# Patient Record
Sex: MALE | URBAN - METROPOLITAN AREA
[De-identification: names, ages, dates, MRNs, and addresses within clinical notes are randomized per-mention and may not be internally consistent; named-entity substitution may affect disease eponyms.]

---

## 2018-09-20 ENCOUNTER — APPOINTMENT (RX ONLY)
Dept: URBAN - METROPOLITAN AREA CLINIC 53 | Facility: CLINIC | Age: 68
Setting detail: DERMATOLOGY
End: 2018-09-20

## 2018-09-20 DIAGNOSIS — L81.4 OTHER MELANIN HYPERPIGMENTATION: ICD-10-CM

## 2018-09-20 DIAGNOSIS — B07.8 OTHER VIRAL WARTS: ICD-10-CM

## 2018-09-20 DIAGNOSIS — Z85.828 PERSONAL HISTORY OF OTHER MALIGNANT NEOPLASM OF SKIN: ICD-10-CM

## 2018-09-20 DIAGNOSIS — S31000A OPEN WOUND(S) (MULTIPLE) OF UNSPECIFIED SITE(S), WITHOUT MENTION OF COMPLICATION: ICD-10-CM

## 2018-09-20 DIAGNOSIS — L40.0 PSORIASIS VULGARIS: ICD-10-CM | Status: WELL CONTROLLED

## 2018-09-20 DIAGNOSIS — D22 MELANOCYTIC NEVI: ICD-10-CM

## 2018-09-20 DIAGNOSIS — L57.0 ACTINIC KERATOSIS: ICD-10-CM

## 2018-09-20 DIAGNOSIS — D69.2 OTHER NONTHROMBOCYTOPENIC PURPURA: ICD-10-CM

## 2018-09-20 DIAGNOSIS — L82.1 OTHER SEBORRHEIC KERATOSIS: ICD-10-CM

## 2018-09-20 PROBLEM — D89.9 DISORDER INVOLVING THE IMMUNE MECHANISM, UNSPECIFIED: Status: ACTIVE | Noted: 2018-09-20

## 2018-09-20 PROBLEM — I10 ESSENTIAL (PRIMARY) HYPERTENSION: Status: ACTIVE | Noted: 2018-09-20

## 2018-09-20 PROBLEM — F32.9 MAJOR DEPRESSIVE DISORDER, SINGLE EPISODE, UNSPECIFIED: Status: ACTIVE | Noted: 2018-09-20

## 2018-09-20 PROBLEM — S51.812A LACERATION WITHOUT FOREIGN BODY OF LEFT FOREARM, INITIAL ENCOUNTER: Status: ACTIVE | Noted: 2018-09-20

## 2018-09-20 PROBLEM — S51.811A LACERATION WITHOUT FOREIGN BODY OF RIGHT FOREARM, INITIAL ENCOUNTER: Status: ACTIVE | Noted: 2018-09-20

## 2018-09-20 PROBLEM — D22.5 MELANOCYTIC NEVI OF TRUNK: Status: ACTIVE | Noted: 2018-09-20

## 2018-09-20 PROBLEM — L29.8 OTHER PRURITUS: Status: ACTIVE | Noted: 2018-09-20

## 2018-09-20 PROCEDURE — 17003 DESTRUCT PREMALG LES 2-14: CPT

## 2018-09-20 PROCEDURE — ? COUNSELING

## 2018-09-20 PROCEDURE — ? LIQUID NITROGEN

## 2018-09-20 PROCEDURE — 17110 DESTRUCTION B9 LES UP TO 14: CPT

## 2018-09-20 PROCEDURE — 17000 DESTRUCT PREMALG LESION: CPT | Mod: 59

## 2018-09-20 PROCEDURE — 99214 OFFICE O/P EST MOD 30 MIN: CPT | Mod: 25

## 2018-09-20 ASSESSMENT — LOCATION DETAILED DESCRIPTION DERM
LOCATION DETAILED: LEFT ULNAR DORSAL HAND
LOCATION DETAILED: LEFT MID-UPPER BACK
LOCATION DETAILED: RIGHT DISTAL DORSAL FOREARM
LOCATION DETAILED: LEFT SUPERIOR FOREHEAD
LOCATION DETAILED: LEFT FOREHEAD
LOCATION DETAILED: STERNUM
LOCATION DETAILED: RIGHT SUPERIOR FOREHEAD
LOCATION DETAILED: RIGHT RADIAL DORSAL HAND
LOCATION DETAILED: LEFT DISTAL DORSAL FOREARM
LOCATION DETAILED: SUPERIOR THORACIC SPINE

## 2018-09-20 ASSESSMENT — LOCATION SIMPLE DESCRIPTION DERM
LOCATION SIMPLE: LEFT FOREHEAD
LOCATION SIMPLE: CHEST
LOCATION SIMPLE: LEFT HAND
LOCATION SIMPLE: UPPER BACK
LOCATION SIMPLE: RIGHT FOREARM
LOCATION SIMPLE: RIGHT FOREHEAD
LOCATION SIMPLE: LEFT UPPER BACK
LOCATION SIMPLE: RIGHT HAND
LOCATION SIMPLE: LEFT FOREARM

## 2018-09-20 ASSESSMENT — LOCATION ZONE DERM
LOCATION ZONE: TRUNK
LOCATION ZONE: FACE
LOCATION ZONE: HAND
LOCATION ZONE: ARM

## 2018-09-20 NOTE — PROCEDURE: LIQUID NITROGEN
Medical Necessity Information: It is in your best interest to select a reason for this procedure from the list below. All of these items fulfill various CMS LCD requirements except the new and changing color options.
Number Of Freeze-Thaw Cycles: 2 freeze-thaw cycles
Medical Necessity Clause: This procedure was medically necessary because the lesions that were treated were:
Render Post-Care Instructions In Note?: no
Detail Level: Detailed
Consent: The patient's consent was obtained including but not limited to risks of crusting, scabbing, blistering, scarring, darker or lighter pigmentary change, recurrence, incomplete removal and infection.
Post-Care Instructions: I reviewed with the patient in detail post-care instructions. Patient is to wear sunprotection, and avoid picking at any of the treated lesions. Pt may apply Vaseline to crusted or scabbing areas.
Duration Of Freeze Thaw-Cycle (Seconds): 0

## 2019-03-28 ENCOUNTER — APPOINTMENT (RX ONLY)
Dept: URBAN - METROPOLITAN AREA CLINIC 53 | Facility: CLINIC | Age: 69
Setting detail: DERMATOLOGY
End: 2019-03-28

## 2019-03-28 DIAGNOSIS — Z85.828 PERSONAL HISTORY OF OTHER MALIGNANT NEOPLASM OF SKIN: ICD-10-CM | Status: RESOLVED

## 2019-03-28 DIAGNOSIS — L81.4 OTHER MELANIN HYPERPIGMENTATION: ICD-10-CM

## 2019-03-28 DIAGNOSIS — L40.0 PSORIASIS VULGARIS: ICD-10-CM | Status: WELL CONTROLLED

## 2019-03-28 DIAGNOSIS — L82.1 OTHER SEBORRHEIC KERATOSIS: ICD-10-CM

## 2019-03-28 DIAGNOSIS — D22 MELANOCYTIC NEVI: ICD-10-CM

## 2019-03-28 PROBLEM — D22.5 MELANOCYTIC NEVI OF TRUNK: Status: ACTIVE | Noted: 2019-03-28

## 2019-03-28 PROCEDURE — 99214 OFFICE O/P EST MOD 30 MIN: CPT

## 2019-03-28 PROCEDURE — ? PRESCRIPTION

## 2019-03-28 PROCEDURE — ? COUNSELING

## 2019-03-28 RX ORDER — CLOBETASOL PROPIONATE 0.5 MG/G
OINTMENT TOPICAL
Qty: 1 | Refills: 6 | Status: ERX | COMMUNITY
Start: 2019-03-28

## 2019-03-28 RX ADMIN — CLOBETASOL PROPIONATE: 0.5 OINTMENT TOPICAL at 00:00

## 2019-03-28 ASSESSMENT — LOCATION SIMPLE DESCRIPTION DERM
LOCATION SIMPLE: RIGHT HAND
LOCATION SIMPLE: UPPER BACK
LOCATION SIMPLE: RIGHT UPPER BACK

## 2019-03-28 ASSESSMENT — LOCATION DETAILED DESCRIPTION DERM
LOCATION DETAILED: RIGHT RADIAL DORSAL HAND
LOCATION DETAILED: SUPERIOR THORACIC SPINE
LOCATION DETAILED: INFERIOR THORACIC SPINE
LOCATION DETAILED: RIGHT SUPERIOR LATERAL UPPER BACK

## 2019-03-28 ASSESSMENT — LOCATION ZONE DERM
LOCATION ZONE: HAND
LOCATION ZONE: TRUNK

## 2019-08-24 ENCOUNTER — APPOINTMENT (OUTPATIENT)
Dept: GENERAL RADIOLOGY | Age: 69
DRG: 543 | End: 2019-08-24
Attending: EMERGENCY MEDICINE
Payer: MEDICARE

## 2019-08-24 ENCOUNTER — APPOINTMENT (OUTPATIENT)
Dept: CT IMAGING | Age: 69
DRG: 543 | End: 2019-08-24
Attending: EMERGENCY MEDICINE
Payer: MEDICARE

## 2019-08-24 ENCOUNTER — HOSPITAL ENCOUNTER (INPATIENT)
Age: 69
LOS: 2 days | Discharge: HOME | DRG: 543 | End: 2019-08-26
Attending: EMERGENCY MEDICINE | Admitting: INTERNAL MEDICINE
Payer: MEDICARE

## 2019-08-24 DIAGNOSIS — R52 INTRACTABLE PAIN: ICD-10-CM

## 2019-08-24 DIAGNOSIS — S22.000A CLOSED COMPRESSION FRACTURE OF THORACIC VERTEBRA, INITIAL ENCOUNTER (HCC): Primary | ICD-10-CM

## 2019-08-24 PROBLEM — G89.29 CHRONIC PAIN: Status: ACTIVE | Noted: 2019-08-24

## 2019-08-24 PROBLEM — J44.9 COPD (CHRONIC OBSTRUCTIVE PULMONARY DISEASE) (HCC): Status: ACTIVE | Noted: 2019-08-24

## 2019-08-24 LAB
ALBUMIN SERPL-MCNC: 4.6 G/DL (ref 3.5–5)
ALBUMIN/GLOB SERPL: 1.3 {RATIO} (ref 1.1–2.2)
ALP SERPL-CCNC: 84 U/L (ref 45–117)
ALT SERPL-CCNC: 20 U/L (ref 12–78)
ANION GAP SERPL CALC-SCNC: 7 MMOL/L (ref 5–15)
APPEARANCE UR: ABNORMAL
APTT PPP: 28.7 SEC (ref 22.1–32)
AST SERPL-CCNC: 13 U/L (ref 15–37)
BACTERIA URNS QL MICRO: NEGATIVE /HPF
BASOPHILS # BLD: 0.1 K/UL (ref 0–0.1)
BASOPHILS NFR BLD: 1 % (ref 0–1)
BILIRUB SERPL-MCNC: 1.1 MG/DL (ref 0.2–1)
BILIRUB UR QL: NEGATIVE
BUN SERPL-MCNC: 22 MG/DL (ref 6–20)
BUN/CREAT SERPL: 19 (ref 12–20)
CALCIUM SERPL-MCNC: 9.9 MG/DL (ref 8.5–10.1)
CHLORIDE SERPL-SCNC: 93 MMOL/L (ref 97–108)
CK MB CFR SERPL CALC: 2.1 % (ref 0–2.5)
CK MB SERPL-MCNC: 1.9 NG/ML (ref 5–25)
CK SERPL-CCNC: 90 U/L (ref 39–308)
CO2 SERPL-SCNC: 30 MMOL/L (ref 21–32)
COLOR UR: ABNORMAL
COMMENT, HOLDF: NORMAL
CREAT SERPL-MCNC: 1.14 MG/DL (ref 0.7–1.3)
CRP SERPL-MCNC: 0.5 MG/DL (ref 0–0.6)
DIFFERENTIAL METHOD BLD: ABNORMAL
EOSINOPHIL # BLD: 0.8 K/UL (ref 0–0.4)
EOSINOPHIL NFR BLD: 6 % (ref 0–7)
EPITH CASTS URNS QL MICRO: ABNORMAL /LPF
ERYTHROCYTE [DISTWIDTH] IN BLOOD BY AUTOMATED COUNT: 17.2 % (ref 11.5–14.5)
ERYTHROCYTE [SEDIMENTATION RATE] IN BLOOD: 2 MM/HR (ref 0–20)
GLOBULIN SER CALC-MCNC: 3.5 G/DL (ref 2–4)
GLUCOSE BLD STRIP.AUTO-MCNC: 102 MG/DL (ref 65–100)
GLUCOSE SERPL-MCNC: 109 MG/DL (ref 65–100)
GLUCOSE UR STRIP.AUTO-MCNC: NEGATIVE MG/DL
HCT VFR BLD AUTO: 50.1 % (ref 36.6–50.3)
HGB BLD-MCNC: 16.5 G/DL (ref 12.1–17)
HGB UR QL STRIP: ABNORMAL
IMM GRANULOCYTES # BLD AUTO: 0.1 K/UL (ref 0–0.04)
IMM GRANULOCYTES NFR BLD AUTO: 0 % (ref 0–0.5)
INR PPP: 1.1 (ref 0.9–1.1)
KETONES UR QL STRIP.AUTO: ABNORMAL MG/DL
LACTATE BLD-SCNC: 0.54 MMOL/L (ref 0.4–2)
LEUKOCYTE ESTERASE UR QL STRIP.AUTO: NEGATIVE
LYMPHOCYTES # BLD: 2.2 K/UL (ref 0.8–3.5)
LYMPHOCYTES NFR BLD: 16 % (ref 12–49)
MCH RBC QN AUTO: 26.4 PG (ref 26–34)
MCHC RBC AUTO-ENTMCNC: 32.9 G/DL (ref 30–36.5)
MCV RBC AUTO: 80.3 FL (ref 80–99)
MONOCYTES # BLD: 1.2 K/UL (ref 0–1)
MONOCYTES NFR BLD: 8 % (ref 5–13)
NEUTS SEG # BLD: 9.7 K/UL (ref 1.8–8)
NEUTS SEG NFR BLD: 69 % (ref 32–75)
NITRITE UR QL STRIP.AUTO: NEGATIVE
NRBC # BLD: 0 K/UL (ref 0–0.01)
NRBC BLD-RTO: 0 PER 100 WBC
PH UR STRIP: 6 [PH] (ref 5–8)
PLATELET # BLD AUTO: 367 K/UL (ref 150–400)
PMV BLD AUTO: 9 FL (ref 8.9–12.9)
POTASSIUM SERPL-SCNC: 3.9 MMOL/L (ref 3.5–5.1)
PROT SERPL-MCNC: 8.1 G/DL (ref 6.4–8.2)
PROT UR STRIP-MCNC: 30 MG/DL
PROTHROMBIN TIME: 11.1 SEC (ref 9–11.1)
RBC # BLD AUTO: 6.24 M/UL (ref 4.1–5.7)
RBC #/AREA URNS HPF: ABNORMAL /HPF (ref 0–5)
SAMPLES BEING HELD,HOLD: NORMAL
SERVICE CMNT-IMP: ABNORMAL
SODIUM SERPL-SCNC: 130 MMOL/L (ref 136–145)
SP GR UR REFRACTOMETRY: 1.01 (ref 1–1.03)
THERAPEUTIC RANGE,PTTT: NORMAL SECS (ref 58–77)
TROPONIN I SERPL-MCNC: <0.05 NG/ML
UA: UC IF INDICATED,UAUC: ABNORMAL
UROBILINOGEN UR QL STRIP.AUTO: 0.2 EU/DL (ref 0.2–1)
WBC # BLD AUTO: 14 K/UL (ref 4.1–11.1)
WBC URNS QL MICRO: ABNORMAL /HPF (ref 0–4)

## 2019-08-24 PROCEDURE — 96376 TX/PRO/DX INJ SAME DRUG ADON: CPT

## 2019-08-24 PROCEDURE — 85610 PROTHROMBIN TIME: CPT

## 2019-08-24 PROCEDURE — 74011000250 HC RX REV CODE- 250: Performed by: EMERGENCY MEDICINE

## 2019-08-24 PROCEDURE — 86140 C-REACTIVE PROTEIN: CPT

## 2019-08-24 PROCEDURE — 65270000029 HC RM PRIVATE

## 2019-08-24 PROCEDURE — 71046 X-RAY EXAM CHEST 2 VIEWS: CPT

## 2019-08-24 PROCEDURE — 99152 MOD SED SAME PHYS/QHP 5/>YRS: CPT

## 2019-08-24 PROCEDURE — 81001 URINALYSIS AUTO W/SCOPE: CPT

## 2019-08-24 PROCEDURE — 96374 THER/PROPH/DIAG INJ IV PUSH: CPT

## 2019-08-24 PROCEDURE — 80053 COMPREHEN METABOLIC PANEL: CPT

## 2019-08-24 PROCEDURE — 85652 RBC SED RATE AUTOMATED: CPT

## 2019-08-24 PROCEDURE — 96375 TX/PRO/DX INJ NEW DRUG ADDON: CPT

## 2019-08-24 PROCEDURE — 85025 COMPLETE CBC W/AUTO DIFF WBC: CPT

## 2019-08-24 PROCEDURE — 74011250636 HC RX REV CODE- 250/636: Performed by: INTERNAL MEDICINE

## 2019-08-24 PROCEDURE — 36415 COLL VENOUS BLD VENIPUNCTURE: CPT

## 2019-08-24 PROCEDURE — 83605 ASSAY OF LACTIC ACID: CPT

## 2019-08-24 PROCEDURE — 74011250636 HC RX REV CODE- 250/636: Performed by: EMERGENCY MEDICINE

## 2019-08-24 PROCEDURE — 87040 BLOOD CULTURE FOR BACTERIA: CPT

## 2019-08-24 PROCEDURE — 74011250636 HC RX REV CODE- 250/636: Performed by: PHYSICIAN ASSISTANT

## 2019-08-24 PROCEDURE — 72132 CT LUMBAR SPINE W/DYE: CPT

## 2019-08-24 PROCEDURE — 72072 X-RAY EXAM THORAC SPINE 3VWS: CPT

## 2019-08-24 PROCEDURE — 85730 THROMBOPLASTIN TIME PARTIAL: CPT

## 2019-08-24 PROCEDURE — 72129 CT CHEST SPINE W/DYE: CPT

## 2019-08-24 PROCEDURE — 74011250637 HC RX REV CODE- 250/637: Performed by: INTERNAL MEDICINE

## 2019-08-24 PROCEDURE — 82962 GLUCOSE BLOOD TEST: CPT

## 2019-08-24 PROCEDURE — 99285 EMERGENCY DEPT VISIT HI MDM: CPT

## 2019-08-24 PROCEDURE — 84484 ASSAY OF TROPONIN QUANT: CPT

## 2019-08-24 PROCEDURE — 74011636320 HC RX REV CODE- 636/320: Performed by: EMERGENCY MEDICINE

## 2019-08-24 PROCEDURE — 82550 ASSAY OF CK (CPK): CPT

## 2019-08-24 PROCEDURE — 72100 X-RAY EXAM L-S SPINE 2/3 VWS: CPT

## 2019-08-24 RX ORDER — METFORMIN HYDROCHLORIDE 500 MG/1
1000 TABLET ORAL
Status: DISCONTINUED | OUTPATIENT
Start: 2019-08-26 | End: 2019-08-26 | Stop reason: HOSPADM

## 2019-08-24 RX ORDER — FENTANYL CITRATE 50 UG/ML
100 INJECTION, SOLUTION INTRAMUSCULAR; INTRAVENOUS
Status: DISCONTINUED | OUTPATIENT
Start: 2019-08-24 | End: 2019-08-24

## 2019-08-24 RX ORDER — MORPHINE SULFATE 30 MG/1
30 CAPSULE, EXTENDED RELEASE ORAL EVERY 8 HOURS
Status: DISCONTINUED | OUTPATIENT
Start: 2019-08-24 | End: 2019-08-24 | Stop reason: SDUPTHER

## 2019-08-24 RX ORDER — ATORVASTATIN CALCIUM 10 MG/1
10 TABLET, FILM COATED ORAL DAILY
Status: DISCONTINUED | OUTPATIENT
Start: 2019-08-25 | End: 2019-08-26 | Stop reason: HOSPADM

## 2019-08-24 RX ORDER — PREDNISONE 10 MG/1
10 TABLET ORAL
Status: DISCONTINUED | OUTPATIENT
Start: 2019-08-25 | End: 2019-08-26 | Stop reason: HOSPADM

## 2019-08-24 RX ORDER — ENOXAPARIN SODIUM 100 MG/ML
40 INJECTION SUBCUTANEOUS EVERY 24 HOURS
Status: DISCONTINUED | OUTPATIENT
Start: 2019-08-24 | End: 2019-08-26 | Stop reason: HOSPADM

## 2019-08-24 RX ORDER — LISINOPRIL 20 MG/1
20 TABLET ORAL DAILY
Status: DISCONTINUED | OUTPATIENT
Start: 2019-08-25 | End: 2019-08-26 | Stop reason: HOSPADM

## 2019-08-24 RX ORDER — MIDAZOLAM HYDROCHLORIDE 1 MG/ML
2 INJECTION, SOLUTION INTRAMUSCULAR; INTRAVENOUS
Status: COMPLETED | OUTPATIENT
Start: 2019-08-24 | End: 2019-08-24

## 2019-08-24 RX ORDER — ALBUTEROL SULFATE 90 UG/1
1 AEROSOL, METERED RESPIRATORY (INHALATION)
Status: DISCONTINUED | OUTPATIENT
Start: 2019-08-24 | End: 2019-08-26 | Stop reason: HOSPADM

## 2019-08-24 RX ORDER — BUDESONIDE AND FORMOTEROL FUMARATE DIHYDRATE 160; 4.5 UG/1; UG/1
2 AEROSOL RESPIRATORY (INHALATION) 2 TIMES DAILY
Status: DISCONTINUED | OUTPATIENT
Start: 2019-08-25 | End: 2019-08-26 | Stop reason: HOSPADM

## 2019-08-24 RX ORDER — ALBUTEROL SULFATE 2.5 MG/.5ML
2.5 SOLUTION RESPIRATORY (INHALATION)
Status: DISCONTINUED | OUTPATIENT
Start: 2019-08-24 | End: 2019-08-26 | Stop reason: HOSPADM

## 2019-08-24 RX ORDER — SODIUM CHLORIDE 0.9 % (FLUSH) 0.9 %
5-40 SYRINGE (ML) INJECTION AS NEEDED
Status: DISCONTINUED | OUTPATIENT
Start: 2019-08-24 | End: 2019-08-26 | Stop reason: HOSPADM

## 2019-08-24 RX ORDER — KETOROLAC TROMETHAMINE 30 MG/ML
15 INJECTION, SOLUTION INTRAMUSCULAR; INTRAVENOUS
Status: COMPLETED | OUTPATIENT
Start: 2019-08-24 | End: 2019-08-24

## 2019-08-24 RX ORDER — SODIUM CHLORIDE 9 MG/ML
75 INJECTION, SOLUTION INTRAVENOUS CONTINUOUS
Status: DISCONTINUED | OUTPATIENT
Start: 2019-08-24 | End: 2019-08-25

## 2019-08-24 RX ORDER — HYDROMORPHONE HYDROCHLORIDE 1 MG/ML
1 INJECTION, SOLUTION INTRAMUSCULAR; INTRAVENOUS; SUBCUTANEOUS
Status: COMPLETED | OUTPATIENT
Start: 2019-08-24 | End: 2019-08-24

## 2019-08-24 RX ORDER — MORPHINE SULFATE 15 MG/1
30 TABLET, FILM COATED, EXTENDED RELEASE ORAL EVERY 12 HOURS
Status: DISCONTINUED | OUTPATIENT
Start: 2019-08-24 | End: 2019-08-26 | Stop reason: HOSPADM

## 2019-08-24 RX ORDER — MORPHINE SULFATE 2 MG/ML
2 INJECTION, SOLUTION INTRAMUSCULAR; INTRAVENOUS
Status: DISCONTINUED | OUTPATIENT
Start: 2019-08-24 | End: 2019-08-26 | Stop reason: HOSPADM

## 2019-08-24 RX ORDER — SODIUM CHLORIDE 0.9 % (FLUSH) 0.9 %
5-40 SYRINGE (ML) INJECTION EVERY 8 HOURS
Status: DISCONTINUED | OUTPATIENT
Start: 2019-08-24 | End: 2019-08-26 | Stop reason: HOSPADM

## 2019-08-24 RX ORDER — LORAZEPAM 2 MG/ML
1 INJECTION INTRAMUSCULAR
Status: DISPENSED | OUTPATIENT
Start: 2019-08-24 | End: 2019-08-25

## 2019-08-24 RX ORDER — DILTIAZEM HYDROCHLORIDE 180 MG/1
180 CAPSULE, COATED, EXTENDED RELEASE ORAL DAILY
Status: DISCONTINUED | OUTPATIENT
Start: 2019-08-25 | End: 2019-08-26 | Stop reason: HOSPADM

## 2019-08-24 RX ORDER — OXYCODONE AND ACETAMINOPHEN 10; 325 MG/1; MG/1
1 TABLET ORAL
Status: DISCONTINUED | OUTPATIENT
Start: 2019-08-24 | End: 2019-08-26 | Stop reason: HOSPADM

## 2019-08-24 RX ORDER — OXYCODONE AND ACETAMINOPHEN 10; 325 MG/1; MG/1
1 TABLET ORAL
Status: DISCONTINUED | OUTPATIENT
Start: 2019-08-24 | End: 2019-08-24 | Stop reason: SDUPTHER

## 2019-08-24 RX ORDER — SODIUM CHLORIDE 0.9 % (FLUSH) 0.9 %
10 SYRINGE (ML) INJECTION
Status: COMPLETED | OUTPATIENT
Start: 2019-08-24 | End: 2019-08-24

## 2019-08-24 RX ORDER — FENTANYL CITRATE 50 UG/ML
100 INJECTION, SOLUTION INTRAMUSCULAR; INTRAVENOUS
Status: COMPLETED | OUTPATIENT
Start: 2019-08-24 | End: 2019-08-24

## 2019-08-24 RX ORDER — LIDOCAINE 4 G/100G
1 PATCH TOPICAL EVERY 24 HOURS
Status: DISCONTINUED | OUTPATIENT
Start: 2019-08-24 | End: 2019-08-26 | Stop reason: HOSPADM

## 2019-08-24 RX ADMIN — HYDROMORPHONE HYDROCHLORIDE 1 MG: 1 INJECTION, SOLUTION INTRAMUSCULAR; INTRAVENOUS; SUBCUTANEOUS at 16:38

## 2019-08-24 RX ADMIN — ENOXAPARIN SODIUM 40 MG: 40 INJECTION SUBCUTANEOUS at 22:19

## 2019-08-24 RX ADMIN — SODIUM CHLORIDE 2000 ML: 900 INJECTION, SOLUTION INTRAVENOUS at 15:55

## 2019-08-24 RX ADMIN — MORPHINE SULFATE 30 MG: 15 TABLET, FILM COATED, EXTENDED RELEASE ORAL at 22:16

## 2019-08-24 RX ADMIN — SODIUM CHLORIDE 75 ML/HR: 900 INJECTION, SOLUTION INTRAVENOUS at 22:00

## 2019-08-24 RX ADMIN — FENTANYL CITRATE 50 MCG: 50 INJECTION, SOLUTION INTRAMUSCULAR; INTRAVENOUS at 17:45

## 2019-08-24 RX ADMIN — HYDROMORPHONE HYDROCHLORIDE 1 MG: 1 INJECTION, SOLUTION INTRAMUSCULAR; INTRAVENOUS; SUBCUTANEOUS at 19:53

## 2019-08-24 RX ADMIN — MIDAZOLAM 2 MG: 1 INJECTION INTRAMUSCULAR; INTRAVENOUS at 19:02

## 2019-08-24 RX ADMIN — IOPAMIDOL 100 ML: 755 INJECTION, SOLUTION INTRAVENOUS at 18:16

## 2019-08-24 RX ADMIN — SODIUM CHLORIDE 1000 ML: 900 INJECTION, SOLUTION INTRAVENOUS at 13:56

## 2019-08-24 RX ADMIN — KETOROLAC TROMETHAMINE 15 MG: 30 INJECTION, SOLUTION INTRAMUSCULAR at 15:44

## 2019-08-24 RX ADMIN — MORPHINE SULFATE 2 MG: 2 INJECTION, SOLUTION INTRAMUSCULAR; INTRAVENOUS at 23:45

## 2019-08-24 RX ADMIN — HYDROMORPHONE HYDROCHLORIDE 1 MG: 1 INJECTION, SOLUTION INTRAMUSCULAR; INTRAVENOUS; SUBCUTANEOUS at 21:29

## 2019-08-24 RX ADMIN — Medication 10 ML: at 18:16

## 2019-08-24 NOTE — ED NOTES
Pt BP has stabilized for now. MRI tech at bedside states the patient cannot have a MRi today because he has a pain pump.  Pt is on the phone with the pain pump company to see if it is compatible with an MRI

## 2019-08-24 NOTE — ED NOTES
Per MRI cannot have MRi today because [employer] would have to come in and intregate the pain pump and they could not come in until Monday.  MD aware and ordered CT
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
## 2019-08-24 NOTE — ED NOTES
Patient instructed not to bolus himself on pain pump until he could be seen by physician due to low BP. Patient verbalized understanding.

## 2019-08-24 NOTE — PROGRESS NOTES
Pt has an implanted pain pump and does not have card. Pt is visiting from [place] and will contact doctor's office for implant info. MRI on hold until compatibility and scanning requirements confirmed.

## 2019-08-25 LAB
ANION GAP SERPL CALC-SCNC: 8 MMOL/L (ref 5–15)
BUN SERPL-MCNC: 21 MG/DL (ref 6–20)
BUN/CREAT SERPL: 24 (ref 12–20)
CALCIUM SERPL-MCNC: 8.7 MG/DL (ref 8.5–10.1)
CHLORIDE SERPL-SCNC: 100 MMOL/L (ref 97–108)
CO2 SERPL-SCNC: 25 MMOL/L (ref 21–32)
CREAT SERPL-MCNC: 0.88 MG/DL (ref 0.7–1.3)
ERYTHROCYTE [DISTWIDTH] IN BLOOD BY AUTOMATED COUNT: 16 % (ref 11.5–14.5)
EST. AVERAGE GLUCOSE BLD GHB EST-MCNC: 137 MG/DL
GLUCOSE BLD STRIP.AUTO-MCNC: 132 MG/DL (ref 65–100)
GLUCOSE BLD STRIP.AUTO-MCNC: 148 MG/DL (ref 65–100)
GLUCOSE SERPL-MCNC: 88 MG/DL (ref 65–100)
HBA1C MFR BLD: 6.4 % (ref 4.2–6.3)
HCT VFR BLD AUTO: 42.5 % (ref 36.6–50.3)
HGB BLD-MCNC: 14.2 G/DL (ref 12.1–17)
MCH RBC QN AUTO: 27.3 PG (ref 26–34)
MCHC RBC AUTO-ENTMCNC: 33.4 G/DL (ref 30–36.5)
MCV RBC AUTO: 81.6 FL (ref 80–99)
NRBC # BLD: 0 K/UL (ref 0–0.01)
NRBC BLD-RTO: 0 PER 100 WBC
PLATELET # BLD AUTO: 295 K/UL (ref 150–400)
PMV BLD AUTO: 9.8 FL (ref 8.9–12.9)
POTASSIUM SERPL-SCNC: 3.6 MMOL/L (ref 3.5–5.1)
RBC # BLD AUTO: 5.21 M/UL (ref 4.1–5.7)
SERVICE CMNT-IMP: ABNORMAL
SERVICE CMNT-IMP: ABNORMAL
SODIUM SERPL-SCNC: 133 MMOL/L (ref 136–145)
WBC # BLD AUTO: 11.8 K/UL (ref 4.1–11.1)

## 2019-08-25 PROCEDURE — 74011250636 HC RX REV CODE- 250/636: Performed by: HOSPITALIST

## 2019-08-25 PROCEDURE — 83036 HEMOGLOBIN GLYCOSYLATED A1C: CPT

## 2019-08-25 PROCEDURE — 80048 BASIC METABOLIC PNL TOTAL CA: CPT

## 2019-08-25 PROCEDURE — 94760 N-INVAS EAR/PLS OXIMETRY 1: CPT

## 2019-08-25 PROCEDURE — 65270000029 HC RM PRIVATE

## 2019-08-25 PROCEDURE — 74011250636 HC RX REV CODE- 250/636: Performed by: INTERNAL MEDICINE

## 2019-08-25 PROCEDURE — 74011636637 HC RX REV CODE- 636/637: Performed by: INTERNAL MEDICINE

## 2019-08-25 PROCEDURE — 74011000250 HC RX REV CODE- 250: Performed by: INTERNAL MEDICINE

## 2019-08-25 PROCEDURE — 85027 COMPLETE CBC AUTOMATED: CPT

## 2019-08-25 PROCEDURE — 36415 COLL VENOUS BLD VENIPUNCTURE: CPT

## 2019-08-25 PROCEDURE — 82962 GLUCOSE BLOOD TEST: CPT

## 2019-08-25 PROCEDURE — 74011250637 HC RX REV CODE- 250/637: Performed by: FAMILY MEDICINE

## 2019-08-25 PROCEDURE — 74011250637 HC RX REV CODE- 250/637: Performed by: INTERNAL MEDICINE

## 2019-08-25 PROCEDURE — 74011250637 HC RX REV CODE- 250/637: Performed by: HOSPITALIST

## 2019-08-25 RX ORDER — BACLOFEN 10 MG/1
10 TABLET ORAL ONCE
Status: COMPLETED | OUTPATIENT
Start: 2019-08-25 | End: 2019-08-25

## 2019-08-25 RX ORDER — SODIUM CHLORIDE 9 MG/ML
75 INJECTION, SOLUTION INTRAVENOUS CONTINUOUS
Status: DISCONTINUED | OUTPATIENT
Start: 2019-08-25 | End: 2019-08-26 | Stop reason: HOSPADM

## 2019-08-25 RX ORDER — CYCLOBENZAPRINE HCL 10 MG
10 TABLET ORAL
Status: COMPLETED | OUTPATIENT
Start: 2019-08-25 | End: 2019-08-25

## 2019-08-25 RX ORDER — KETOROLAC TROMETHAMINE 30 MG/ML
15 INJECTION, SOLUTION INTRAMUSCULAR; INTRAVENOUS ONCE
Status: COMPLETED | OUTPATIENT
Start: 2019-08-25 | End: 2019-08-25

## 2019-08-25 RX ORDER — CYCLOBENZAPRINE HCL 10 MG
5 TABLET ORAL
Status: DISCONTINUED | OUTPATIENT
Start: 2019-08-25 | End: 2019-08-26 | Stop reason: HOSPADM

## 2019-08-25 RX ADMIN — PREDNISONE 10 MG: 10 TABLET ORAL at 08:28

## 2019-08-25 RX ADMIN — SODIUM CHLORIDE 75 ML/HR: 900 INJECTION, SOLUTION INTRAVENOUS at 17:21

## 2019-08-25 RX ADMIN — MORPHINE SULFATE 2 MG: 2 INJECTION, SOLUTION INTRAMUSCULAR; INTRAVENOUS at 12:41

## 2019-08-25 RX ADMIN — MORPHINE SULFATE 30 MG: 15 TABLET, FILM COATED, EXTENDED RELEASE ORAL at 20:51

## 2019-08-25 RX ADMIN — ATORVASTATIN CALCIUM 10 MG: 10 TABLET, FILM COATED ORAL at 08:28

## 2019-08-25 RX ADMIN — MORPHINE SULFATE 2 MG: 2 INJECTION, SOLUTION INTRAMUSCULAR; INTRAVENOUS at 16:48

## 2019-08-25 RX ADMIN — ENOXAPARIN SODIUM 40 MG: 40 INJECTION SUBCUTANEOUS at 21:04

## 2019-08-25 RX ADMIN — Medication 10 ML: at 08:30

## 2019-08-25 RX ADMIN — MORPHINE SULFATE 30 MG: 15 TABLET, FILM COATED, EXTENDED RELEASE ORAL at 08:29

## 2019-08-25 RX ADMIN — MORPHINE SULFATE 2 MG: 2 INJECTION, SOLUTION INTRAMUSCULAR; INTRAVENOUS at 03:41

## 2019-08-25 RX ADMIN — DILTIAZEM HYDROCHLORIDE 180 MG: 180 CAPSULE, COATED, EXTENDED RELEASE ORAL at 08:28

## 2019-08-25 RX ADMIN — CYCLOBENZAPRINE HYDROCHLORIDE 5 MG: 10 TABLET, FILM COATED ORAL at 21:04

## 2019-08-25 RX ADMIN — BACLOFEN 10 MG: 10 TABLET ORAL at 05:57

## 2019-08-25 RX ADMIN — OXYCODONE HYDROCHLORIDE AND ACETAMINOPHEN 1 TABLET: 10; 325 TABLET ORAL at 01:47

## 2019-08-25 RX ADMIN — Medication 10 ML: at 14:15

## 2019-08-25 RX ADMIN — Medication 10 ML: at 21:04

## 2019-08-25 RX ADMIN — KETOROLAC TROMETHAMINE 15 MG: 30 INJECTION, SOLUTION INTRAMUSCULAR at 06:58

## 2019-08-25 RX ADMIN — CYCLOBENZAPRINE HYDROCHLORIDE 5 MG: 10 TABLET, FILM COATED ORAL at 16:15

## 2019-08-25 RX ADMIN — MORPHINE SULFATE 2 MG: 2 INJECTION, SOLUTION INTRAMUSCULAR; INTRAVENOUS at 08:28

## 2019-08-25 RX ADMIN — CYCLOBENZAPRINE HYDROCHLORIDE 10 MG: 10 TABLET, FILM COATED ORAL at 00:56

## 2019-08-25 NOTE — ED NOTES
TRANSFER - OUT REPORT:    Verbal report given to Alannah(name) on Kelsey Cornell  being transferred to Ortho(unit) for routine progression of care       Report consisted of patients Situation, Background, Assessment and   Recommendations(SBAR). Information from the following report(s) SBAR, Kardex and MAR was reviewed with the receiving nurse. Lines:   Peripheral IV 08/24/19 Right Antecubital (Active)   Site Assessment Clean, dry, & intact 8/24/2019  1:42 PM   Phlebitis Assessment 0 8/24/2019  1:42 PM   Infiltration Assessment 0 8/24/2019  1:42 PM   Dressing Status Clean, dry, & intact 8/24/2019  1:42 PM   Dressing Type Transparent;Tape 8/24/2019  1:42 PM       Peripheral IV 08/24/19 Left Antecubital (Active)   Site Assessment Clean, dry, & intact 8/24/2019  3:32 PM   Phlebitis Assessment 0 8/24/2019  3:32 PM   Infiltration Assessment 0 8/24/2019  3:32 PM   Dressing Status Clean, dry, & intact 8/24/2019  3:32 PM   Dressing Type Tape;Transparent 8/24/2019  3:32 PM        Opportunity for questions and clarification was provided.       Patient transported with:   [employer]

## 2019-08-25 NOTE — PROGRESS NOTES
Bedside and Verbal shift change report given to [address] (oncoming nurse) by Lana Noble (offgoing nurse). Report included the following information SBAR, Kardex, Intake/Output, MAR and Recent Results.
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
## 2019-08-25 NOTE — PROGRESS NOTES
**Consult Information**  Member Facility: [address] [place] [address] MRN: [id number]  Consult ID: [id number]  Facility Time Zone: ET  Date and Time of Consult: 08/24/2019 11:55:38 PM  Requesting Clinician: Adebayo Corbin  Patient Name: Cielo Curry  YOB: 1950  Gender: Male    **Clinical Note**  Clinical Note: Clinical Concern (What problem needs to be solved for this patient?)  Patient complaining of muscle spasms. States he takes flexeril 10mg 3 times a day at home. Patient admitted for increased pain, patient suffers from chronic pain. Chart reviewed. 1 dose of flexeril 10mg ordered. **Attestation**  Interaction Mode: Electronic Interaction  Interaction Attestation: Interprofessional internet consultation was delivered through telephonic and/ or electronic communication upon the request of the patients treating physician, while the requesting and the rendering provider were not in the same physical location. Written report was provided to the requesting provider.   Evaluation Duration (mins): 3

## 2019-08-25 NOTE — PROGRESS NOTES
Hospitalist Progress Note    NAME: Kati Hunt   :  1950   MRN:  [id number]       Assessment / Plan:  Intractable Back pain:   Compression fracture   CT Spine\" Age-indeterminate T9 and T10 compression fractures. Postoperative changes from L4 through S1. No visualized fluid collection or abnormal  enhancement in the thoracic or lumbar spine. \"     No signs of infection, no fever. Pt reported being on dilaudid pump  Apparently is medtronic, will be helpful to contact the company to see if they can check the pump but also to see if an MRI can be done. Continue PRN pain control  Ortho consulted:  Pt lives in [place]- treated by Dr Carly Orantes and Dr Cony Cox by Inessa Fiore at [facility]. in [place] [phone]  Called placed by ortho PA to their office , waiting call back     C/w IV morphine q4h prn , has been requesting it every 4h , also on MS contin  Will add flexeril      Testicular pain , seems like referred pain from the back resolved per pt :     Leucocytosis , most likely steroid induced   Wbc 14k , trending down 11k   Pt is on prednisone at home      Hyponatremia:  Na 133, c/w IVF      HTN : Controlled , continue current meds, adjust as needed      DM: As per patient On metformin 1000 mg a day in am ( not in med rec) will add it. , ,  HbA1c 6.4, adjust meds ad needed      plaque psoriasis: Continue home meds, no flares        Code Status: full  Surrogate Decision Maker:     DVT Prophylaxis: yes  GI Prophylaxis: not indicated     Baseline: lives at home. Before hospitalization, able to do all ADL by himself    25.0 - 29.9 Overweight / Body mass index is 26.39 kg/m². Subjective:     Chief Complaint / Reason for Physician Visit  FU back pain . Pt reported feeling better , less pain . Discussed with RN events overnight.      Review of Systems:  Symptom Y/N Comments  Symptom Y/N Comments   Fever/Chills n   Chest Pain n    Poor Appetite    Edema     Cough n   Abdominal Pain n    Sputum    Joint Pain     SOB/LUNA n   Pruritis/Rash     Nausea/vomit n   Tolerating PT/OT     Diarrhea    Tolerating Diet y    Constipation    Other       Could NOT obtain due to:      Objective:     VITALS:   Last 24hrs VS reviewed since prior progress note. Most recent are:  Patient Vitals for the past 24 hrs:   Temp Pulse Resp BP SpO2   08/25/19 1121 98 °F (36.7 °C) 73 18 110/65 95 %   08/25/19 0748 97.2 °F (36.2 °C) 87 18 137/70 99 %   08/25/19 0402 98.2 °F (36.8 °C) 87 18 157/88 98 %   08/24/19 2307 97.4 °F (36.3 °C) (!) 102 22 129/84 98 %   08/24/19 2045  (!) 103 25 (!) 73/23 96 %   08/24/19 2030  100 25 (!) 160/131 94 %   08/24/19 2015  99 22 (!) 158/104 92 %   08/24/19 1925  96 19 (!) 145/117    08/24/19 1815  (!) 102 22 110/90 (!) 89 %   08/24/19 1615  94 23 (!) 157/92 99 %   08/24/19 1610  92 27 131/83 96 %   08/24/19 1600  92 14 (!) 84/71 94 %   08/24/19 1547  94 15 (!) 85/58 (!) 87 %     No intake or output data in the 24 hours ending 08/25/19 1458     PHYSICAL EXAM:  General: WD, WN. Alert, cooperative, no acute distress    EENT:  EOMI. Anicteric sclerae. MMM  Resp:  CTA bilaterally, no wheezing or rales. No accessory muscle use  CV:  Regular  rhythm,  No edema  GI:  Soft, Non distended, Non tender.  +Bowel sounds  Neurologic:  Alert and oriented X 3, normal speech,   Psych:   Good insight. Not anxious nor agitated  Skin:  No rashes.   No jaundice    Reviewed most current lab test results and cultures  YES  Reviewed most current radiology test results   YES  Review and summation of old records today    NO  Reviewed patient's current orders and MAR    YES  PMH/SH reviewed - no change compared to H&P  ________________________________________________________________________  Care Plan discussed with:    Comments   Patient x    Family      RN x    Care Manager     Consultant                        Multidiciplinary team rounds were held today with , nursing, pharmacist and clinical coordinator. Patient's plan of care was discussed; medications were reviewed and discharge planning was addressed. ________________________________________________________________________  Total NON critical care TIME:  25 Minutes    Total CRITICAL CARE TIME Spent:   Minutes non procedure based      Comments   >50% of visit spent in counseling and coordination of care     ________________________________________________________________________  Barry Patton MD     Procedures: see electronic medical records for all procedures/Xrays and details which were not copied into this note but were reviewed prior to creation of Plan. LABS:  I reviewed today's most current labs and imaging studies.   Pertinent labs include:  Recent Labs     08/25/19  0332 08/24/19  1328   WBC 11.8* 14.0*   HGB 14.2 16.5   HCT 42.5 50.1    367     Recent Labs     08/25/19  0332 08/24/19  1418 08/24/19  1328   *  --  130*   K 3.6  --  3.9     --  93*   CO2 25  --  30   GLU 88  --  109*   BUN 21*  --  22*   CREA 0.88  --  1.14   CA 8.7  --  9.9   ALB  --   --  4.6   TBILI  --   --  1.1*   SGOT  --   --  13*   ALT  --   --  20   INR  --  1.1  --        Signed: Barry Patton MD

## 2019-08-25 NOTE — PROGRESS NOTES
Bedside and Verbal shift change report given to [place] (oncoming nurse) by Bettina Brooks (offgoing nurse). Report included the following information SBAR, Kardex, Intake/Output, MAR, Recent Results and Med Rec Status.

## 2019-08-25 NOTE — PROGRESS NOTES
11:55 PM  Patient complaining of pain and muscle spasms, states he takes flexeril 10mg 3x day at home. Medicated for pain and telehospitalist called for muscle relaxer. Waiting on response. Received one time dose of flexeril 10mg. 5:45 AM  Patient complains of unrelieved muscle spasms, telehospitalist contacted and meds ordered.

## 2019-08-25 NOTE — CONSULTS
Ortho:  Acute on chronic midline back and bilat LE radicular pain--over the last 5days  Denies F/C/Flu like  Localizes pain to the lower thoracic/lumbar spine- radiating pain around the hips, down the anterior thighs, around post knees  Pt states he has never had pain this severe- but the symptoms are similar to prior complaints  Denies new injury or trauma---but has been picking up his 39lb-14mth old grandson  Pt lives in [facility]- treated by Dr Bowen Curry and Dr Dallis Kawasaki by Inessa Fiore at [facility]. in [place] [phone])  Has Medtronic morphine pain pump     Pt sleeping in chair NAD  Neurovascular exam intact LE bilat    CT SPINE [facility] W CONT, CT SPINE LUMB W CONT   INDICATION: Back pain and fever. COMPARISON: None. FINDINGS:  Thoracic spine:  The alignment of the thoracic spine is normal.   The vertebral body heights and disc spaces are well-preserved. There are age-indeterminate T9 and T10 compression fractures. There is no spinal canal stenosis. There is no abnormal enhancement or epidural fluid collection.   Lumbar spine:  The alignment of the lumbar spine is normal.  There is degenerative disc disease at L4-5 and L5-S1, with disc space narrowing. Posterior fusion hardware extends from L4 through S1 and appears intact. No acute fracture is evident. Chronic appearing superior endplate deformity at  L3 is noted. There is no abnormal enhancement or epidural fluid collection. IMPRESSION: Age-indeterminate T9 and T10 compression fractures. Postoperative  changes from L4 through S1. No visualized fluid collection or abnormal enhancement in the thoracic or lumbar spine.     Would recommend MRI to help define acuity of Compression fxr-  IR for possible kypho  Trying to confirm device and ability to safely scan - call placed to MD office   Continue pain mgt- mobilize as tolerated    Plan per Dr Christina Uriostegui, P[facility]      This patient was seen and examined by be me personally with the findings as documented above by the NP/PA with the following changes/additions:    NONE-Will check MRI for signs of acute fracture amenable to kyphoplasty. Pain management. All pertinent medical history, medications, and test results and imaging were reviewed by me personally. Plan for treatment is as described and formulated by me after discussion with the patient and family personally.

## 2019-08-26 VITALS
RESPIRATION RATE: 18 BRPM | TEMPERATURE: 98.2 F | WEIGHT: 200 LBS | OXYGEN SATURATION: 99 % | SYSTOLIC BLOOD PRESSURE: 129 MMHG | HEIGHT: 73 IN | HEART RATE: 77 BPM | BODY MASS INDEX: 26.51 KG/M2 | DIASTOLIC BLOOD PRESSURE: 67 MMHG

## 2019-08-26 LAB
ANION GAP SERPL CALC-SCNC: 4 MMOL/L (ref 5–15)
BASOPHILS # BLD: 0.1 K/UL (ref 0–0.1)
BASOPHILS NFR BLD: 1 % (ref 0–1)
BUN SERPL-MCNC: 19 MG/DL (ref 6–20)
BUN/CREAT SERPL: 25 (ref 12–20)
CALCIUM SERPL-MCNC: 9 MG/DL (ref 8.5–10.1)
CHLORIDE SERPL-SCNC: 106 MMOL/L (ref 97–108)
CO2 SERPL-SCNC: 28 MMOL/L (ref 21–32)
CREAT SERPL-MCNC: 0.76 MG/DL (ref 0.7–1.3)
DIFFERENTIAL METHOD BLD: ABNORMAL
EOSINOPHIL # BLD: 0.8 K/UL (ref 0–0.4)
EOSINOPHIL NFR BLD: 7 % (ref 0–7)
ERYTHROCYTE [DISTWIDTH] IN BLOOD BY AUTOMATED COUNT: 16.4 % (ref 11.5–14.5)
GLUCOSE BLD STRIP.AUTO-MCNC: 107 MG/DL (ref 65–100)
GLUCOSE BLD STRIP.AUTO-MCNC: 137 MG/DL (ref 65–100)
GLUCOSE SERPL-MCNC: 102 MG/DL (ref 65–100)
HCT VFR BLD AUTO: 43.4 % (ref 36.6–50.3)
HGB BLD-MCNC: 13.9 G/DL (ref 12.1–17)
IMM GRANULOCYTES # BLD AUTO: 0 K/UL (ref 0–0.04)
IMM GRANULOCYTES NFR BLD AUTO: 0 % (ref 0–0.5)
LYMPHOCYTES # BLD: 1.6 K/UL (ref 0.8–3.5)
LYMPHOCYTES NFR BLD: 13 % (ref 12–49)
MCH RBC QN AUTO: 26.3 PG (ref 26–34)
MCHC RBC AUTO-ENTMCNC: 32 G/DL (ref 30–36.5)
MCV RBC AUTO: 82 FL (ref 80–99)
MONOCYTES # BLD: 1.3 K/UL (ref 0–1)
MONOCYTES NFR BLD: 11 % (ref 5–13)
NEUTS SEG # BLD: 8.2 K/UL (ref 1.8–8)
NEUTS SEG NFR BLD: 68 % (ref 32–75)
NRBC # BLD: 0 K/UL (ref 0–0.01)
NRBC BLD-RTO: 0 PER 100 WBC
PLATELET # BLD AUTO: 260 K/UL (ref 150–400)
PMV BLD AUTO: 9.8 FL (ref 8.9–12.9)
POTASSIUM SERPL-SCNC: 4.5 MMOL/L (ref 3.5–5.1)
RBC # BLD AUTO: 5.29 M/UL (ref 4.1–5.7)
SERVICE CMNT-IMP: ABNORMAL
SERVICE CMNT-IMP: ABNORMAL
SODIUM SERPL-SCNC: 138 MMOL/L (ref 136–145)
WBC # BLD AUTO: 11.9 K/UL (ref 4.1–11.1)

## 2019-08-26 PROCEDURE — 74011250637 HC RX REV CODE- 250/637: Performed by: INTERNAL MEDICINE

## 2019-08-26 PROCEDURE — 74011636637 HC RX REV CODE- 636/637: Performed by: INTERNAL MEDICINE

## 2019-08-26 PROCEDURE — 80048 BASIC METABOLIC PNL TOTAL CA: CPT

## 2019-08-26 PROCEDURE — 85025 COMPLETE CBC W/AUTO DIFF WBC: CPT

## 2019-08-26 PROCEDURE — 36415 COLL VENOUS BLD VENIPUNCTURE: CPT

## 2019-08-26 PROCEDURE — 74011250636 HC RX REV CODE- 250/636: Performed by: INTERNAL MEDICINE

## 2019-08-26 PROCEDURE — 82962 GLUCOSE BLOOD TEST: CPT

## 2019-08-26 PROCEDURE — 94760 N-INVAS EAR/PLS OXIMETRY 1: CPT

## 2019-08-26 RX ORDER — CYCLOBENZAPRINE HCL 10 MG
10 TABLET ORAL
COMMUNITY

## 2019-08-26 RX ORDER — PREDNISONE 10 MG/1
10 TABLET ORAL DAILY
COMMUNITY
End: 2020-01-08

## 2019-08-26 RX ORDER — METFORMIN HYDROCHLORIDE 500 MG/1
500 TABLET ORAL 2 TIMES DAILY WITH MEALS
Status: ON HOLD | COMMUNITY
End: 2020-01-08 | Stop reason: SDUPTHER

## 2019-08-26 RX ORDER — LIDOCAINE 4 G/100G
1 PATCH TOPICAL EVERY 24 HOURS
Qty: 7 PATCH | Refills: 0 | Status: SHIPPED
Start: 2019-08-26 | End: 2019-08-26 | Stop reason: SDUPTHER

## 2019-08-26 RX ORDER — LIDOCAINE 4 G/100G
1 PATCH TOPICAL EVERY 24 HOURS
Qty: 7 PATCH | Refills: 0 | Status: SHIPPED | OUTPATIENT
Start: 2019-08-26 | End: 2019-09-02

## 2019-08-26 RX ORDER — LIDOCAINE 4 G/100G
1 PATCH TOPICAL EVERY 24 HOURS
Qty: 7 PATCH | Refills: 0 | Status: SHIPPED | OUTPATIENT
Start: 2019-08-26 | End: 2019-08-26 | Stop reason: SDUPTHER

## 2019-08-26 RX ORDER — ENALAPRIL MALEATE 5 MG/1
5 TABLET ORAL
COMMUNITY
End: 2020-01-06

## 2019-08-26 RX ORDER — TEMAZEPAM 30 MG/1
30 CAPSULE ORAL
COMMUNITY

## 2019-08-26 RX ORDER — ENALAPRIL MALEATE 10 MG/1
10 TABLET ORAL 2 TIMES DAILY
COMMUNITY

## 2019-08-26 RX ADMIN — MORPHINE SULFATE 2 MG: 2 INJECTION, SOLUTION INTRAMUSCULAR; INTRAVENOUS at 02:58

## 2019-08-26 RX ADMIN — OXYCODONE HYDROCHLORIDE AND ACETAMINOPHEN 1 TABLET: 10; 325 TABLET ORAL at 06:39

## 2019-08-26 RX ADMIN — LISINOPRIL 20 MG: 20 TABLET ORAL at 09:13

## 2019-08-26 RX ADMIN — MORPHINE SULFATE 2 MG: 2 INJECTION, SOLUTION INTRAMUSCULAR; INTRAVENOUS at 09:11

## 2019-08-26 RX ADMIN — CYCLOBENZAPRINE HYDROCHLORIDE 5 MG: 10 TABLET, FILM COATED ORAL at 04:08

## 2019-08-26 RX ADMIN — MORPHINE SULFATE 30 MG: 15 TABLET, FILM COATED, EXTENDED RELEASE ORAL at 09:12

## 2019-08-26 RX ADMIN — ATORVASTATIN CALCIUM 10 MG: 10 TABLET, FILM COATED ORAL at 09:13

## 2019-08-26 RX ADMIN — PREDNISONE 10 MG: 10 TABLET ORAL at 09:12

## 2019-08-26 RX ADMIN — METFORMIN HYDROCHLORIDE 1000 MG: 500 TABLET ORAL at 09:12

## 2019-08-26 RX ADMIN — Medication 10 ML: at 05:13

## 2019-08-26 RX ADMIN — DILTIAZEM HYDROCHLORIDE 180 MG: 180 CAPSULE, COATED, EXTENDED RELEASE ORAL at 09:12

## 2019-08-26 NOTE — PROGRESS NOTES
Bedside and Verbal shift change report given to [address] [employer] (oncoming nurse) by Nancy Sherman RN (offgoing nurse). Report included the following information SBAR, Kardex, Procedure Summary, Intake/Output, MAR and Recent Results.

## 2019-08-26 NOTE — PROGRESS NOTES
Bedside and Verbal shift change report given to Lana Noble (oncoming nurse) by [address] (offgoing nurse). Report included the following information SBAR, Kardex, Intake/Output, MAR and Recent Results.

## 2019-08-26 NOTE — DISCHARGE INSTRUCTIONS
DISCHARGE DIAGNOSIS:  Intractable Back pain:   Compression fracture T9-T10   Testicular pain , seems like referred pain from the back resolved per pt :   Leucocytosis , most likely steroid induced    Hyponatremia:  HTN :     DM:     MEDICATIONS:  · It is important that you take the medication exactly as they are prescribed. · Keep your medication in the bottles provided by the pharmacist and keep a list of the medication names, dosages, and times to be taken in your wallet. · Do not take other medications without consulting your doctor. Pain Management: per above medications    What to do at Home    Recommended diet:  Diabetic diet   Recommended activity: Activity as tolerated    If you have questions regarding the hospital related prescriptions or hospital related issues please call [address] [place] [address] at . You can always direct your questions to your primary care doctor if you are unable to reach your hospital physician; your PCP works as an extension of your hospital doctor just like your hospital doctor is an extension of your PCP for your time at the hospital [facility], [facility]).     If you experience any of the following symptoms then please call your primary care physician or return to the emergency room if you cannot get hold of your doctor:  Fever, chills, nausea, vomiting, diarrhea, change in mentation, falling, bleeding, shortness of breath

## 2019-08-26 NOTE — PROGRESS NOTES
Reviewed discharge instructions, prescriptions, and side effects with patient. Reviewed wound care, follow-up instructions, and medication instructions. Answered all questions and provided contact information for future questions. Took IV out per policy, Catheter tip intact. Provided Post-op Hygiene kit. Going home in a car with family.
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
## 2019-08-26 NOTE — PROGRESS NOTES
Pharmacy Medication Reconciliation     The patient was interviewed regarding current PTA medication list, use and drug allergies;  sister  present in room and obtained permission from patient to discuss drug regimen with visitor(s) present. The patient was questioned regarding use of any other inhalers, topical products, over the counter medications, herbal medications, vitamin products or ophthalmic/nasal/otic medication use. Allergy Update: Patient has no known allergies. Recommendations/Findings: The following amendments were made to the patient's active medication list on file at [facility]:     1) Additions:   Metformin - Note due to contrast on 8/25 should not be restarted until 8/26  Temazepam  Cyclobenzaprine    2) Deletions:   Morphine CR  Spiriva  Albuterol Inhaler      3) Changes:   Enalapril : he has been taking two of the 5 mg tabs = 10 mg qam and one 5 mg qhs. His new prescription is for 10 mg BID however he has had it filled but has not been taking it yet. Prednisone 10mg daily  Symbicort - he is only taking it daily       -Clarified PTA med list with patient, his meds in hand and his I-Phone . PTA medication list was corrected to the following:     Prior to Admission Medications   Prescriptions Last Dose Informant Patient Reported? Taking? apremilast 30 mg tab 8/19/2019 at Unknown time  Yes Yes   Sig: Take 30 mg by mouth two (2) times a day. 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   atorvastatin (LIPITOR) 20 mg tablet   Yes No   Sig: Take 10 mg by mouth daily. budesonide-formoterol (SYMBICORT) 160-4.5 mcg/actuation HFA inhaler   Yes No   Sig: Take 2 Puffs by inhalation daily. cyclobenzaprine (FLEXERIL) 10 mg tablet   Yes Yes   Sig: Take 10 mg by mouth three (3) times daily as needed for Muscle Spasm(s). diltiazem XR (DILACOR XR) 180 mg XR capsule   Yes No   Sig: Take 180 mg by mouth daily. enalapril (VASOTEC) 10 mg tablet   Yes Yes   Sig: Take 10 mg by mouth daily.    enalapril (VASOTEC) 5 mg tablet   Yes Yes   Sig: Take 5 mg by mouth nightly. metFORMIN (GLUCOPHAGE) 500 mg tablet   Yes Yes   Sig: Take 500 mg by mouth two (2) times daily (with meals). oxyCODONE-acetaminophen (PERCOCET 10)  mg per tablet   Yes No   Sig: Take 1 Tab by mouth every four (4) hours as needed for Pain. Indications: PAIN   predniSONE (DELTASONE) 10 mg tablet   Yes Yes   Sig: Take 10 mg by mouth daily. temazepam (RESTORIL) 30 mg capsule   Yes Yes   Sig: Take 30 mg by mouth nightly as needed for Sleep.       Facility-Administered Medications: None          Thank you,  Janeth Wynn, [facility]

## 2019-08-29 LAB
BACTERIA SPEC CULT: NORMAL
SERVICE CMNT-IMP: NORMAL

## 2019-10-15 ENCOUNTER — RX ONLY (OUTPATIENT)
Age: 69
Setting detail: RX ONLY
End: 2019-10-15

## 2019-10-15 RX ORDER — APREMILAST 30 MG/1
TABLET, FILM COATED ORAL
Qty: 30 | Refills: 11 | Status: CANCELLED
Stop reason: CLARIF

## 2019-12-10 ENCOUNTER — APPOINTMENT (RX ONLY)
Dept: URBAN - METROPOLITAN AREA CLINIC 53 | Facility: CLINIC | Age: 69
Setting detail: DERMATOLOGY
End: 2019-12-10

## 2019-12-10 DIAGNOSIS — L40.0 PSORIASIS VULGARIS: ICD-10-CM | Status: WELL CONTROLLED

## 2019-12-10 PROCEDURE — ? OTEZLA MONITORING

## 2019-12-10 PROCEDURE — ? COUNSELING

## 2019-12-10 PROCEDURE — 99213 OFFICE O/P EST LOW 20 MIN: CPT

## 2019-12-10 ASSESSMENT — BSA PSORIASIS: % BODY COVERED IN PSORIASIS: 0

## 2019-12-10 NOTE — PROCEDURE: OTEZLA MONITORING
Detail Level: Generalized
Comments: Well controlled
Add High Risk Medication Management Associated Diagnosis?: No
Length Of Therapy: 3+ years

## 2019-12-10 NOTE — PROCEDURE: MIPS QUALITY
Quality 337: Tuberculosis Prevention For Psoriasis And Psoriatic Arthritis Patients On A Biological Immune Response Modifier: No documentation of negative or managed positive TB screen
Detail Level: Detailed

## 2020-01-06 ENCOUNTER — APPOINTMENT (OUTPATIENT)
Dept: GENERAL RADIOLOGY | Age: 70
DRG: 246 | End: 2020-01-06
Payer: MEDICARE

## 2020-01-06 ENCOUNTER — HOSPITAL ENCOUNTER (INPATIENT)
Age: 70
LOS: 2 days | Discharge: HOME | DRG: 246 | End: 2020-01-08
Attending: EMERGENCY MEDICINE | Admitting: INTERNAL MEDICINE
Payer: MEDICARE

## 2020-01-06 DIAGNOSIS — J45.41 MODERATE PERSISTENT ASTHMA WITH ACUTE EXACERBATION: ICD-10-CM

## 2020-01-06 DIAGNOSIS — J18.9 COMMUNITY ACQUIRED PNEUMONIA OF LEFT LOWER LOBE OF LUNG: Primary | ICD-10-CM

## 2020-01-06 DIAGNOSIS — I20.0 UNSTABLE ANGINA (HCC): ICD-10-CM

## 2020-01-06 DIAGNOSIS — R77.8 TROPONIN LEVEL ELEVATED: ICD-10-CM

## 2020-01-06 PROBLEM — I25.10 CAD (CORONARY ARTERY DISEASE): Status: ACTIVE | Noted: 2020-01-06

## 2020-01-06 LAB
ALBUMIN SERPL-MCNC: 3.4 G/DL (ref 3.5–5)
ALBUMIN/GLOB SERPL: 1.1 {RATIO} (ref 1.1–2.2)
ALP SERPL-CCNC: 68 U/L (ref 45–117)
ALT SERPL-CCNC: 22 U/L (ref 12–78)
ANION GAP SERPL CALC-SCNC: 5 MMOL/L (ref 5–15)
AST SERPL-CCNC: 14 U/L (ref 15–37)
ATRIAL RATE: 94 BPM
BASOPHILS # BLD: 0.1 K/UL (ref 0–0.1)
BASOPHILS NFR BLD: 0 % (ref 0–1)
BILIRUB SERPL-MCNC: 1.2 MG/DL (ref 0.2–1)
BNP SERPL-MCNC: 321 PG/ML
BUN SERPL-MCNC: 27 MG/DL (ref 6–20)
BUN/CREAT SERPL: 21 (ref 12–20)
CALCIUM SERPL-MCNC: 8.8 MG/DL (ref 8.5–10.1)
CALCULATED P AXIS, ECG09: 71 DEGREES
CALCULATED R AXIS, ECG10: 82 DEGREES
CALCULATED T AXIS, ECG11: 57 DEGREES
CHLORIDE SERPL-SCNC: 100 MMOL/L (ref 97–108)
CK SERPL-CCNC: 46 U/L (ref 39–308)
CO2 SERPL-SCNC: 28 MMOL/L (ref 21–32)
CREAT SERPL-MCNC: 1.3 MG/DL (ref 0.7–1.3)
DIAGNOSIS, 93000: NORMAL
DIFFERENTIAL METHOD BLD: ABNORMAL
EOSINOPHIL # BLD: 0.2 K/UL (ref 0–0.4)
EOSINOPHIL NFR BLD: 1 % (ref 0–7)
ERYTHROCYTE [DISTWIDTH] IN BLOOD BY AUTOMATED COUNT: 16.6 % (ref 11.5–14.5)
GLOBULIN SER CALC-MCNC: 3.2 G/DL (ref 2–4)
GLUCOSE BLD STRIP.AUTO-MCNC: 147 MG/DL (ref 65–100)
GLUCOSE BLD STRIP.AUTO-MCNC: 216 MG/DL (ref 65–100)
GLUCOSE SERPL-MCNC: 135 MG/DL (ref 65–100)
HCT VFR BLD AUTO: 39.7 % (ref 36.6–50.3)
HGB BLD-MCNC: 12.6 G/DL (ref 12.1–17)
IMM GRANULOCYTES # BLD AUTO: 0.2 K/UL (ref 0–0.04)
IMM GRANULOCYTES NFR BLD AUTO: 1 % (ref 0–0.5)
LACTATE BLD-SCNC: 0.85 MMOL/L (ref 0.4–2)
LYMPHOCYTES # BLD: 1.2 K/UL (ref 0.8–3.5)
LYMPHOCYTES NFR BLD: 5 % (ref 12–49)
MCH RBC QN AUTO: 26.5 PG (ref 26–34)
MCHC RBC AUTO-ENTMCNC: 31.7 G/DL (ref 30–36.5)
MCV RBC AUTO: 83.6 FL (ref 80–99)
MONOCYTES # BLD: 1 K/UL (ref 0–1)
MONOCYTES NFR BLD: 5 % (ref 5–13)
NEUTS SEG # BLD: 19.6 K/UL (ref 1.8–8)
NEUTS SEG NFR BLD: 88 % (ref 32–75)
NRBC # BLD: 0 K/UL (ref 0–0.01)
NRBC BLD-RTO: 0 PER 100 WBC
P-R INTERVAL, ECG05: 118 MS
PLATELET # BLD AUTO: 243 K/UL (ref 150–400)
PMV BLD AUTO: 9.1 FL (ref 8.9–12.9)
POTASSIUM SERPL-SCNC: 4.7 MMOL/L (ref 3.5–5.1)
PROT SERPL-MCNC: 6.6 G/DL (ref 6.4–8.2)
Q-T INTERVAL, ECG07: 314 MS
QRS DURATION, ECG06: 90 MS
QTC CALCULATION (BEZET), ECG08: 392 MS
RBC # BLD AUTO: 4.75 M/UL (ref 4.1–5.7)
SERVICE CMNT-IMP: ABNORMAL
SERVICE CMNT-IMP: ABNORMAL
SODIUM SERPL-SCNC: 133 MMOL/L (ref 136–145)
TROPONIN I SERPL-MCNC: 0.14 NG/ML
VENTRICULAR RATE, ECG03: 94 BPM
WBC # BLD AUTO: 22.3 K/UL (ref 4.1–11.1)

## 2020-01-06 PROCEDURE — 87040 BLOOD CULTURE FOR BACTERIA: CPT

## 2020-01-06 PROCEDURE — 96366 THER/PROPH/DIAG IV INF ADDON: CPT

## 2020-01-06 PROCEDURE — 83605 ASSAY OF LACTIC ACID: CPT

## 2020-01-06 PROCEDURE — 74011250637 HC RX REV CODE- 250/637: Performed by: NURSE PRACTITIONER

## 2020-01-06 PROCEDURE — 93005 ELECTROCARDIOGRAM TRACING: CPT

## 2020-01-06 PROCEDURE — 82962 GLUCOSE BLOOD TEST: CPT

## 2020-01-06 PROCEDURE — 84484 ASSAY OF TROPONIN QUANT: CPT

## 2020-01-06 PROCEDURE — 36415 COLL VENOUS BLD VENIPUNCTURE: CPT

## 2020-01-06 PROCEDURE — 80053 COMPREHEN METABOLIC PANEL: CPT

## 2020-01-06 PROCEDURE — 74011250636 HC RX REV CODE- 250/636: Performed by: EMERGENCY MEDICINE

## 2020-01-06 PROCEDURE — 83880 ASSAY OF NATRIURETIC PEPTIDE: CPT

## 2020-01-06 PROCEDURE — 74011000250 HC RX REV CODE- 250: Performed by: HOSPITALIST

## 2020-01-06 PROCEDURE — 74011250636 HC RX REV CODE- 250/636: Performed by: INTERNAL MEDICINE

## 2020-01-06 PROCEDURE — 96365 THER/PROPH/DIAG IV INF INIT: CPT

## 2020-01-06 PROCEDURE — 85025 COMPLETE CBC W/AUTO DIFF WBC: CPT

## 2020-01-06 PROCEDURE — 74011000258 HC RX REV CODE- 258: Performed by: INTERNAL MEDICINE

## 2020-01-06 PROCEDURE — 94640 AIRWAY INHALATION TREATMENT: CPT

## 2020-01-06 PROCEDURE — 74011000250 HC RX REV CODE- 250: Performed by: EMERGENCY MEDICINE

## 2020-01-06 PROCEDURE — 96375 TX/PRO/DX INJ NEW DRUG ADDON: CPT

## 2020-01-06 PROCEDURE — 65660000000 HC RM CCU STEPDOWN

## 2020-01-06 PROCEDURE — 71046 X-RAY EXAM CHEST 2 VIEWS: CPT

## 2020-01-06 PROCEDURE — 82550 ASSAY OF CK (CPK): CPT

## 2020-01-06 PROCEDURE — 74011250637 HC RX REV CODE- 250/637: Performed by: INTERNAL MEDICINE

## 2020-01-06 PROCEDURE — 99285 EMERGENCY DEPT VISIT HI MDM: CPT

## 2020-01-06 RX ORDER — ONDANSETRON 2 MG/ML
4 INJECTION INTRAMUSCULAR; INTRAVENOUS
Status: DISCONTINUED | OUTPATIENT
Start: 2020-01-06 | End: 2020-01-08 | Stop reason: HOSPADM

## 2020-01-06 RX ORDER — HYDROMORPHONE HYDROCHLORIDE 1 MG/ML
0.5 INJECTION, SOLUTION INTRAMUSCULAR; INTRAVENOUS; SUBCUTANEOUS
Status: DISCONTINUED | OUTPATIENT
Start: 2020-01-06 | End: 2020-01-08 | Stop reason: HOSPADM

## 2020-01-06 RX ORDER — IPRATROPIUM BROMIDE AND ALBUTEROL SULFATE 2.5; .5 MG/3ML; MG/3ML
6 SOLUTION RESPIRATORY (INHALATION)
Status: COMPLETED | OUTPATIENT
Start: 2020-01-06 | End: 2020-01-06

## 2020-01-06 RX ORDER — TEMAZEPAM 15 MG/1
30 CAPSULE ORAL
Status: DISCONTINUED | OUTPATIENT
Start: 2020-01-06 | End: 2020-01-08 | Stop reason: HOSPADM

## 2020-01-06 RX ORDER — INSULIN LISPRO 100 [IU]/ML
INJECTION, SOLUTION INTRAVENOUS; SUBCUTANEOUS
Status: DISCONTINUED | OUTPATIENT
Start: 2020-01-06 | End: 2020-01-08 | Stop reason: HOSPADM

## 2020-01-06 RX ORDER — SODIUM CHLORIDE 9 MG/ML
50 INJECTION, SOLUTION INTRAVENOUS CONTINUOUS
Status: DISCONTINUED | OUTPATIENT
Start: 2020-01-06 | End: 2020-01-07

## 2020-01-06 RX ORDER — AZITHROMYCIN 250 MG/1
500 TABLET, FILM COATED ORAL DAILY
Status: DISCONTINUED | OUTPATIENT
Start: 2020-01-07 | End: 2020-01-08 | Stop reason: HOSPADM

## 2020-01-06 RX ORDER — CYCLOBENZAPRINE HCL 10 MG
10 TABLET ORAL
Status: DISCONTINUED | OUTPATIENT
Start: 2020-01-06 | End: 2020-01-08 | Stop reason: HOSPADM

## 2020-01-06 RX ORDER — OXYCODONE AND ACETAMINOPHEN 10; 325 MG/1; MG/1
1 TABLET ORAL
Status: DISCONTINUED | OUTPATIENT
Start: 2020-01-06 | End: 2020-01-08 | Stop reason: HOSPADM

## 2020-01-06 RX ORDER — ENOXAPARIN SODIUM 100 MG/ML
1 INJECTION SUBCUTANEOUS
Status: COMPLETED | OUTPATIENT
Start: 2020-01-06 | End: 2020-01-06

## 2020-01-06 RX ORDER — ACETAMINOPHEN 325 MG/1
650 TABLET ORAL
Status: DISCONTINUED | OUTPATIENT
Start: 2020-01-06 | End: 2020-01-06 | Stop reason: SDUPTHER

## 2020-01-06 RX ORDER — ASPIRIN 81 MG/1
81 TABLET ORAL DAILY
Status: DISCONTINUED | OUTPATIENT
Start: 2020-01-06 | End: 2020-01-07

## 2020-01-06 RX ORDER — DILTIAZEM HYDROCHLORIDE 180 MG/1
180 CAPSULE, COATED, EXTENDED RELEASE ORAL DAILY
Status: DISCONTINUED | OUTPATIENT
Start: 2020-01-07 | End: 2020-01-07

## 2020-01-06 RX ORDER — METOPROLOL TARTRATE 25 MG/1
25 TABLET, FILM COATED ORAL EVERY 12 HOURS
Status: DISCONTINUED | OUTPATIENT
Start: 2020-01-06 | End: 2020-01-08 | Stop reason: HOSPADM

## 2020-01-06 RX ORDER — ACETAMINOPHEN 325 MG/1
650 TABLET ORAL
Status: DISCONTINUED | OUTPATIENT
Start: 2020-01-06 | End: 2020-01-08 | Stop reason: HOSPADM

## 2020-01-06 RX ORDER — ASPIRIN 81 MG/1
81 TABLET ORAL DAILY
Status: DISCONTINUED | OUTPATIENT
Start: 2020-01-07 | End: 2020-01-06

## 2020-01-06 RX ORDER — IPRATROPIUM BROMIDE AND ALBUTEROL SULFATE 2.5; .5 MG/3ML; MG/3ML
3 SOLUTION RESPIRATORY (INHALATION)
Status: DISCONTINUED | OUTPATIENT
Start: 2020-01-06 | End: 2020-01-08 | Stop reason: HOSPADM

## 2020-01-06 RX ORDER — DEXTROSE MONOHYDRATE 100 MG/ML
0-250 INJECTION, SOLUTION INTRAVENOUS AS NEEDED
Status: DISCONTINUED | OUTPATIENT
Start: 2020-01-06 | End: 2020-01-08 | Stop reason: HOSPADM

## 2020-01-06 RX ORDER — SODIUM CHLORIDE 9 MG/ML
100 INJECTION, SOLUTION INTRAVENOUS CONTINUOUS
Status: DISCONTINUED | OUTPATIENT
Start: 2020-01-07 | End: 2020-01-07

## 2020-01-06 RX ORDER — BUDESONIDE 0.5 MG/2ML
500 INHALANT ORAL
Status: DISCONTINUED | OUTPATIENT
Start: 2020-01-06 | End: 2020-01-08 | Stop reason: HOSPADM

## 2020-01-06 RX ORDER — CLOPIDOGREL BISULFATE 75 MG/1
75 TABLET ORAL DAILY
Status: DISCONTINUED | OUTPATIENT
Start: 2020-01-07 | End: 2020-01-07

## 2020-01-06 RX ORDER — ATORVASTATIN CALCIUM 40 MG/1
40 TABLET, FILM COATED ORAL DAILY
Status: DISCONTINUED | OUTPATIENT
Start: 2020-01-07 | End: 2020-01-08 | Stop reason: HOSPADM

## 2020-01-06 RX ORDER — CLOPIDOGREL BISULFATE 75 MG/1
75 TABLET ORAL DAILY
COMMUNITY
End: 2020-01-08

## 2020-01-06 RX ORDER — MAGNESIUM SULFATE 100 %
4 CRYSTALS MISCELLANEOUS AS NEEDED
Status: DISCONTINUED | OUTPATIENT
Start: 2020-01-06 | End: 2020-01-08 | Stop reason: HOSPADM

## 2020-01-06 RX ORDER — ARFORMOTEROL TARTRATE 15 UG/2ML
15 SOLUTION RESPIRATORY (INHALATION)
Status: DISCONTINUED | OUTPATIENT
Start: 2020-01-06 | End: 2020-01-08 | Stop reason: HOSPADM

## 2020-01-06 RX ORDER — IPRATROPIUM BROMIDE AND ALBUTEROL SULFATE 2.5; .5 MG/3ML; MG/3ML
3 SOLUTION RESPIRATORY (INHALATION)
Status: DISCONTINUED | OUTPATIENT
Start: 2020-01-06 | End: 2020-01-06

## 2020-01-06 RX ORDER — GUAIFENESIN 100 MG/5ML
400 SOLUTION ORAL 3 TIMES DAILY
Status: DISCONTINUED | OUTPATIENT
Start: 2020-01-06 | End: 2020-01-08 | Stop reason: HOSPADM

## 2020-01-06 RX ORDER — LABETALOL HYDROCHLORIDE 5 MG/ML
10 INJECTION, SOLUTION INTRAVENOUS
Status: DISCONTINUED | OUTPATIENT
Start: 2020-01-06 | End: 2020-01-08 | Stop reason: HOSPADM

## 2020-01-06 RX ORDER — GUAIFENESIN 100 MG/5ML
LIQUID (ML) ORAL
Status: DISCONTINUED
Start: 2020-01-06 | End: 2020-01-06 | Stop reason: WASHOUT

## 2020-01-06 RX ADMIN — METHYLPREDNISOLONE SODIUM SUCCINATE 125 MG: 125 INJECTION, POWDER, FOR SOLUTION INTRAMUSCULAR; INTRAVENOUS at 12:14

## 2020-01-06 RX ADMIN — SODIUM CHLORIDE 50 ML/HR: 900 INJECTION, SOLUTION INTRAVENOUS at 18:53

## 2020-01-06 RX ADMIN — IPRATROPIUM BROMIDE AND ALBUTEROL SULFATE 3 ML: .5; 3 SOLUTION RESPIRATORY (INHALATION) at 20:33

## 2020-01-06 RX ADMIN — AZITHROMYCIN MONOHYDRATE 500 MG: 500 INJECTION, POWDER, LYOPHILIZED, FOR SOLUTION INTRAVENOUS at 12:15

## 2020-01-06 RX ADMIN — METOPROLOL TARTRATE 25 MG: 25 TABLET ORAL at 22:22

## 2020-01-06 RX ADMIN — ASPIRIN 81 MG: 81 TABLET, COATED ORAL at 22:22

## 2020-01-06 RX ADMIN — ENOXAPARIN SODIUM 90 MG: 100 INJECTION SUBCUTANEOUS at 18:55

## 2020-01-06 RX ADMIN — CEFTRIAXONE 1 G: 1 INJECTION, POWDER, FOR SOLUTION INTRAMUSCULAR; INTRAVENOUS at 18:51

## 2020-01-06 RX ADMIN — METHYLPREDNISOLONE SODIUM SUCCINATE 60 MG: 125 INJECTION, POWDER, FOR SOLUTION INTRAMUSCULAR; INTRAVENOUS at 18:52

## 2020-01-06 RX ADMIN — IPRATROPIUM BROMIDE AND ALBUTEROL SULFATE 6 ML: .5; 3 SOLUTION RESPIRATORY (INHALATION) at 12:16

## 2020-01-06 NOTE — Clinical Note
Lesion: Located in the Ostium LAD. Stent deployed. Single technique used. First inflation pressure = 16 rené; inflation time: 15 sec.

## 2020-01-06 NOTE — Clinical Note
Single view of the left ventricle obtained using power injection. Total volume = 18 mL. Rate = 12 mL/sec.

## 2020-01-06 NOTE — CONSULTS
[facility] [address] [facility]     Date of  Admission: 1/6/2020 11:12 AM     Admission type:Emergency    Consult for: chest pain  Consult by: hospitalist     Subjective:     Nils Holley is a 81 y.o. male admitted for chest pain. Previous hx of PCI/PINO to LAD in Oct 2019 at OSH. Since has c/o heaviness in his chest which has progressively worsened, with associated diaphoresis and SOB. Carollynn Boys shows likely LLL PNA. He has been compliant with his medications. ECG SR-ST with no acute changes, troponin 0.14. WBC 22     Previous treatment/evaluation includes Percutaneous Coronary Intervention and echocardiogram . Cardiac risk factors: smoking/ tobacco exposure, family history, dyslipidemia, male gender, hypertension.       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    Psoriasis 12/21/2015    DM type 2 (diabetes mellitus, type 2) ([address] 12/21/2015    Chronic back pain 12/21/2015    Sepsis ([address] 12/21/2015      None  Past Medical History:   Diagnosis Date    Asthma     CAD (coronary artery disease) 1/6/2020    Oct 2019 PCI/PINO LAD in OS    Diabetes ([address]     Hypertension     Ill-defined condition     lumbar fusion, cervical fusion      Social History     Socioeconomic History    Marital status:      Spouse name: Not on file    Number of children: Not on file    Years of education: Not on file    Highest education level: Not on file   Tobacco Use    Smoking status: Smoker, Current Status Unknown    Smokeless tobacco: Never Used   Substance and Sexual Activity    Alcohol use: No    Drug use: No   Social History Narrative    ** Merged History Encounter **          No Known Allergies   Family History   Problem Relation Age of Onset    Cancer Mother     Stroke Father       Current Facility-Administered Medications   Medication Dose Route Frequency    acetaminophen (TYLENOL) tablet 650 mg  650 mg Oral Q6H PRN    . PHARMACY TO SUBSTITUTE PER PROTOCOL (Reordered from: apremilast 30 mg tab)    Per Protocol    [START ON 1/7/2020] atorvastatin (LIPITOR) tablet 40 mg  40 mg Oral DAILY    . PHARMACY TO SUBSTITUTE PER PROTOCOL (Reordered from: budesonide-formoterol (SYMBICORT) 160-4.5 mcg/actuation HFA inhaler)    Per Protocol    [START ON 1/7/2020] clopidogrel (PLAVIX) tablet 75 mg  75 mg Oral DAILY    cyclobenzaprine (FLEXERIL) tablet 10 mg  10 mg Oral TID PRN    [START ON 1/7/2020] dilTIAZem CD (CARDIZEM CD) capsule 180 mg  180 mg Oral DAILY    oxyCODONE-acetaminophen (PERCOCET 10)  mg per tablet 1 Tab  1 Tab Oral Q4H PRN    temazepam (RESTORIL) capsule 30 mg  30 mg Oral QHS PRN    cefTRIAXone (ROCEPHIN) 1 g in 0.9% sodium chloride (MBP/ADV) 50 mL  1 g IntraVENous Q24H    [START ON 1/7/2020] azithromycin (ZITHROMAX) tablet 500 mg  500 mg Oral DAILY    guaiFENesin (ROBITUSSIN) 100 mg/5 mL oral liquid 400 mg  400 mg Oral TID    methylPREDNISolone (PF) (Solu-MEDROL) injection 60 mg  60 mg IntraVENous Q6H    enoxaparin (LOVENOX) injection 90 mg  1 mg/kg SubCUTAneous ONCE    acetaminophen (TYLENOL) tablet 650 mg  650 mg Oral Q4H PRN    ondansetron (ZOFRAN) injection 4 mg  4 mg IntraVENous Q6H PRN    insulin lispro (HUMALOG) injection   SubCUTAneous AC&HS    glucose chewable tablet 16 g  4 Tab Oral PRN    glucagon (GLUCAGEN) injection 1 mg  1 mg IntraMUSCular PRN    0.9% sodium chloride infusion  50 mL/hr IntraVENous CONTINUOUS    labetalol (NORMODYNE;TRANDATE) 20 mg/4 mL (5 mg/mL) injection 10 mg  10 mg IntraVENous Q6H PRN    HYDROmorphone (PF) (DILAUDID) injection 0.5 mg  0.5 mg IntraVENous Q4H PRN    dextrose 10% infusion 0-250 mL  0-250 mL IntraVENous PRN    albuterol-ipratropium (DUO-NEB) 2.5 MG-0.5 MG/3 ML  3 mL Nebulization Q6H RT    budesonide (PULMICORT) 500 mcg/2 ml nebulizer suspension  500 mcg Nebulization BID RT    And    arformoterol (BROVANA) neb solution 15 mcg  15 mcg Nebulization BID RT    [START ON 1/7/2020] aspirin delayed-release tablet 81 mg  81 mg Oral DAILY    metoprolol tartrate (LOPRESSOR) tablet 25 mg  25 mg Oral Q12H    [START ON 1/7/2020] 0.9% sodium chloride infusion  100 mL/hr IntraVENous CONTINUOUS     Current Outpatient Medications   Medication Sig    clopidogrel (PLAVIX) 75 mg tab Take 75 mg by mouth daily.  vit C/E/Zn/coppr/lutein/zeaxan (PRESERVISION AREDS-2 PO) Take 1 Tab by mouth two (2) times a day.  hydromorphone HCl/PF (DILAUDID, PF, INJECTION) by Injection route. Pt. Has a dilaudid pain pump.  IV INFUSION PUMP by Does Not Apply route. Pt has dilaudid pain pump    predniSONE (DELTASONE) 10 mg tablet Take 10 mg by mouth daily.  temazepam (RESTORIL) 30 mg capsule Take 30 mg by mouth nightly as needed for Sleep.  metFORMIN (GLUCOPHAGE) 500 mg tablet Take 500 mg by mouth two (2) times daily (with meals).  cyclobenzaprine (FLEXERIL) 10 mg tablet Take 10 mg by mouth three (3) times daily as needed for Muscle Spasm(s).  enalapril (VASOTEC) 10 mg tablet Take 10 mg by mouth two (2) times a day.  diltiazem XR (DILACOR XR) 180 mg XR capsule Take 180 mg by mouth daily.  atorvastatin (LIPITOR) 40 mg tablet Take 40 mg by mouth daily.  apremilast 30 mg tab Take 30 mg by mouth two (2) times a day. 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    oxyCODONE-acetaminophen (PERCOCET 10)  mg per tablet Take 1 Tab by mouth every four (4) hours as needed for Pain.  Indications: PAIN    budesonide-formoterol (SYMBICORT) 160-4.5 mcg/actuation HFA inhaler Take 2 Puffs by inhalation daily. Review of Symptoms:   11 systems reviewed, negative other than as stated in the HPI        Objective:      Visit Vitals  BP (!) 126/107 (BP 1 Location: Left arm, BP Patient Position: Sitting)   Pulse 87   Temp 98.3 °F (36.8 °C)   Resp 15   Ht 6' 1\" (1.854 m)   Wt 93 kg (205 lb)   SpO2 96%   BMI 27.05 kg/m²       Physical:   General: slightly obese  male in no acute distress  Heart: tachy no m/S3/JVD, no carotid bruits   Lungs: rhonchi  Abdomen: Soft, +BS, NTND   Extremities: LE ismael +DP/PT, no edema   Neurologic: Grossly normal    Data Review:   Recent Labs     01/06/20  1126   WBC 22.3*   HGB 12.6   HCT 39.7        Recent Labs     01/06/20  1126   *   K 4.7      CO2 28   *   BUN 27*   CREA 1.30   CA 8.8   ALB 3.4*   TBILI 1.2*   SGOT 14*   ALT 22       Recent Labs     01/06/20  1126   TROIQ 0.14*       No intake or output data in the 24 hours ending 01/06/20 1610     Cardiographics    Telemetry: SR-ST  ECG: SR with no acute changes    Echocardiogram: pending    CXRAY:Patchy left lower lobe consolidation. Assessment:       Active Problems:    Hypertension (12/22/2015)      Left lower lobe pneumonia ([facility] (12/21/2015)      Asthma with acute exacerbation in adult (12/21/2015)      DM type 2 (diabetes mellitus, type 2) ([facility] (12/21/2015)      Overview: Diet controlled      COPD (chronic obstructive pulmonary disease) ([facility] (8/24/2019)      Pneumonia (1/6/2020)      Elevated troponin (1/6/2020)      CAD (coronary artery disease) (1/6/2020)      Overview: Oct 2019 PCI/PINO LAD in OSH         Plan:     Elevated troponin:  With his significant hx and symptoms, recommend cardiac cath  Follow trend of troponin. May be r/t infection  I discussed the risks/benefits/alternatives of cardiac catheterization +/- PCI with the patient.  Risks include (but are not limited to) bleeding, infection, cva/mi/tamponade/death. The patient understands and wishes to proceed. Plan for cath tomorrow at 7:30AM  Start on ASA 81mg daily, metoprolol, continue on statin, Plavix. Received Lovenox    Thank you for consulting [address], NP       [facility]    1/6/2020         Patient seen, examined by me personally. Plan discussed as detailed. Agree with note as outlined by  NP. I confirm findings in history and physical exam. No additional findings noted. Agree with plan as outlined above. Had stent to LAD in [place]. Symptoms present for over a month. Progressively worse. Discussed cath/PCI. Continue plavix, ASA. Add betablocker. Discussed with Dr. Rocky Morse.     Kyaw Monge MD

## 2020-01-06 NOTE — Clinical Note
Lesion located in the Ostium LAD. Balloon inflated using single inflation technique. Pressure = 18 rené; Duration = 15 sec.

## 2020-01-06 NOTE — Clinical Note
Right radial prepped with ChloraPrep Wet prep solution applied at: 735. Wet prep solution dried at: 737. Wet prep elapsed drying time: 2 mins.

## 2020-01-06 NOTE — PROGRESS NOTES
Pharmacy Clarification of the Prior to Admission Medication Regimen Retrospective to the Admission Medication Reconciliation    The patient was interviewed regarding clarification of the prior to admission medication regimen. Patient's ex wife was present in room and obtained permission from patient to discuss drug regimen with visitor(s) present. Patient was questioned regarding use of any other inhalers, topical products, over the counter medications, herbal medications, vitamin products or ophthalmic/nasal/otic medication use. Information Obtained From: RX Query, Patient    Recommendations/Findings: The following amendments were made to the patient's active medication list on file at [facility]:     1) Additions:   clopidogrel (PLAVIX) 75 mg tab  IV INFUSION PUMP  hydromorphone HCl/PF (DILAUDID, PF, INJECTION)  vit C/E/Zn/coppr/lutein/zeaxan (PRESERVISION AREDS-2 PO)    2) Removals:   Enlapril 5 mg    3) Changes:  atorvastatin (LIPITOR) tablet (Old regimen: (strength 20 mg) 10 mg daily /New regimen: (strength 40 mg) 40 mg daily)  enalapril (VASOTEC) 10 mg tablet (Old regimen: 10 mg daily /New regimen: 10 mg BID)    4) Pertinent Pharmacy Findings:  apremilast 30 mg tab: Patient has this agent in the room with him. Patient has a pain pump that has Dilaudid. Patient sees Dr. Uziel Howard every 45 days at [address] in [facility] [phone]. Patient had pump last filled on 12/20/19. Information given below was from [place], RN at [facility]. PTA medication list was corrected to the following:     Prior to Admission Medications   Prescriptions Last Dose Informant Taking? apremilast 30 mg tab 1/6/2020 at Unknown time Self Yes   Sig: Take 30 mg by mouth two (2) times a day. 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    budesonide-formoterol (SYMBICORT) 160-4.5 mcg/actuation HFA inhaler 1/5/2020 at Unknown time Self Yes   Sig: Take 2 Puffs by inhalation daily. clopidogrel (PLAVIX) 75 mg tab 1/6/2020 at Unknown time Self Yes   Sig: Take 75 mg by mouth daily. cyclobenzaprine (FLEXERIL) 10 mg tablet 1/5/2020 at Unknown time Self Yes   Sig: Take 10 mg by mouth three (3) times daily as needed for Muscle Spasm(s). diltiazem XR (DILACOR XR) 180 mg XR capsule 1/6/2020 at Unknown time Self Yes   Sig: Take 180 mg by mouth daily. enalapril (VASOTEC) 10 mg tablet 1/5/2020 at Unknown time Self Yes   Sig: Take 10 mg by mouth two (2) times a day. metFORMIN (GLUCOPHAGE) 500 mg tablet 1/6/2020 at Unknown time Self Yes   Sig: Take 500 mg by mouth two (2) times daily (with meals). oxyCODONE-acetaminophen (PERCOCET 10)  mg per tablet 1/5/2020 at Unknown time Self Yes   Sig: Take 1 Tab by mouth every four (4) hours as needed for Pain. Indications: PAIN   predniSONE (DELTASONE) 10 mg tablet 1/6/2020 at Unknown time Self Yes   Sig: Take 10 mg by mouth daily. sub-q infusion pump (SUBCUTANEOUS INFUSION PUMP) 1/6/2020 at Unknown time Other Yes   Sig: by Does Not Apply route. Dilaudid Concentration: 20 mg/ml  Daily dose: 7.134 mg/day  Pump size: 20 cc  Last fill: 12/20/19  Next Fill: 1/31/20   temazepam (RESTORIL) 30 mg capsule 12/6/2019 at Unknown time Self Yes   Sig: Take 30 mg by mouth nightly as needed for Sleep.   vit C/E/Zn/coppr/lutein/zeaxan (PRESERVISION AREDS-2 PO) 1/5/2020 at Unknown time Self Yes   Sig: Take 1 Tab by mouth two (2) times a day.       Facility-Administered Medications: None           Thank you,  Raeann Carter  Medication History Pharmacy Technician

## 2020-01-06 NOTE — PROGRESS NOTES
Reason for Admission:   Community acquired PNA                  RRAT Score:     14 moderate risk             Do you (patient/family) have any concerns for transition/discharge? No concerns at this time              Plan for utilizing home health:   Has not used home health in the past    Current Advanced Directive/Advance Care Plan:  none            Transition of Care Plan:          1. Patient in ED bed waiting for inpatient admission  2. Patient will need 2nd IM letter at discharge  3. Patient is staying with family in [place]. Patient expects to discharge to his sister Emily's house with follow up appointments. Patient was previously hoping to return to [place] this Friday    Patient is a 81year old male admitted 1/6 for PNA. Patient alert and oriented, resting in bed, no visitors present in room. Demographic information verified and correct. Insurance information verified and correct. Patient lives in [place] but has been staying here with his sister Latonia Lipscomb in [place] for the holidays. Patient does not used home oxygen, no DME, has not used home health in the past.  Patient uses [address]  Patient is independent with all ADLs and can drive. Patient's sister Latonia Lipscomb can transport at discharge. Care Management Interventions  PCP Verified by CM: Yes(pt's PCP is in [place])  Mode of Transport at Discharge: Other (see comment)(pt's sister Latonia Lipscomb can transport at [facility])  Transition of Care Consult (CM Consult): Discharge Planning  Discharge Durable Medical Equipment: No  Physical Therapy Consult: No  Occupational Therapy Consult: No  Speech Therapy Consult: No  Current Support Network:  Other(staying with family in [place], 3 story house, 6 steps to enter)  Confirm Follow Up Transport: Family  Discharge Location  Discharge Placement: [address], RN, [address] [place] [address]  [phone]

## 2020-01-06 NOTE — Clinical Note
Lesion located in the Ostium LAD. Balloon inflated using single inflation technique. Pressure = 8 rené; Duration = 10 sec.

## 2020-01-06 NOTE — ED PROVIDER NOTES
EMERGENCY DEPARTMENT HISTORY AND PHYSICAL EXAM      Date: 1/6/2020  Patient Name: Germán Henderson  Patient Age and Sex: 81 y.o. male     History of Presenting Illness     Chief Complaint   Patient presents with    Chest Pain     acute on chronic chest pain since stent placement in October; reports associated SOB and body aches       History Provided By: Patient    HPI: Germán Henderson is a 21-year-old male with a history of CAD, hypertension, presenting for chest pain. Patient states that he had an LAD stent placed October 1 by his cardiologist in [place]. He is currently visiting his sisters here in [facility]. States that since his stent placement has been waking up in the middle of the night with chest pain, pounding chest pains, shortness of breath. Did speak with his cardiologist who told him it possibly is due to vasospasms of the coronary arteries after stent placement. Has been taking nitroglycerin with some relief. Patient states that last night he was again woken up in the middle of his sleep because of chest pounding and pain as well as shortness of breath. Took a nitroglycerin under his tongue which did not relieve the pain. States currently not having any pain but frustrated by these pains. He does have a history of asthma and did not take his nebulizer treatment today. States it is associated with chills, but denies any fevers. Asked about cough, patient does state that in the last 3 days has been having an increased cough. No rhinorrhea or congestion. There are no other complaints, changes, or physical findings at this time. PCP: None    No current facility-administered medications on file prior to encounter. Current Outpatient Medications on File Prior to Encounter   Medication Sig Dispense Refill    clopidogrel (PLAVIX) 75 mg tab Take 75 mg by mouth daily.  vit C/E/Zn/coppr/lutein/zeaxan (PRESERVISION AREDS-2 PO) Take 1 Tab by mouth two (2) times a day.       IV INFUSION PUMP by Does Not Apply route. Pt has dilaudid pain pump      predniSONE (DELTASONE) 10 mg tablet Take 10 mg by mouth daily.  temazepam (RESTORIL) 30 mg capsule Take 30 mg by mouth nightly as needed for Sleep.  metFORMIN (GLUCOPHAGE) 500 mg tablet Take 500 mg by mouth two (2) times daily (with meals).  cyclobenzaprine (FLEXERIL) 10 mg tablet Take 10 mg by mouth three (3) times daily as needed for Muscle Spasm(s).  enalapril (VASOTEC) 10 mg tablet Take 10 mg by mouth two (2) times a day.  diltiazem XR (DILACOR XR) 180 mg XR capsule Take 180 mg by mouth daily.  atorvastatin (LIPITOR) 40 mg tablet Take 40 mg by mouth daily.  apremilast 30 mg tab Take 30 mg by mouth two (2) times a day. 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 Indications: PAIN      budesonide-formoterol (SYMBICORT) 160-4.5 mcg/actuation HFA inhaler Take 2 Puffs by inhalation daily.  [DISCONTINUED] hydromorphone HCl/PF (DILAUDID, PF, INJECTION) by Injection route. Pt. Has a dilaudid pain pump.  [DISCONTINUED] enalapril (VASOTEC) 5 mg tablet Take 5 mg by mouth nightly. Past History     Past Medical History:  Past Medical History:   Diagnosis Date    Asthma     CAD (coronary artery disease) 1/6/2020    Oct 2019 PCI/PINO LAD in OSH    Diabetes ([facility] [address]     Hypertension     Ill-defined condition     lumbar fusion, cervical fusion       Past Surgical History:  Past Surgical History:   Procedure Laterality Date    HX ORTHOPAEDIC      Lumbar and cervical fusion.     HX OTHER SURGICAL      Morphine pump placed in back       Family History:  Family History   Problem Relation Age of Onset    Cancer Mother     Stroke Father        Social History:  Social History     Tobacco Use    Smoking status: Smoker, Current Status Unknown    Smokeless tobacco: Never Used   Substance Use Topics    Alcohol use: No    Drug use: No       Allergies:  No Known Allergies      Review of Systems   Review of Systems   Constitutional: Positive for chills. Negative for diaphoresis and fever. Respiratory: Positive for cough, chest tightness and shortness of breath. Cardiovascular: Positive for chest pain and palpitations. Negative for leg swelling. Gastrointestinal: Negative for abdominal pain, constipation, diarrhea, nausea and vomiting. Neurological: Negative for weakness and numbness. All other systems reviewed and are negative. Physical Exam   Physical Exam  Vitals signs and nursing note reviewed. Constitutional:       Appearance: He is well-developed. HENT:      Head: Normocephalic and atraumatic. Eyes:      Conjunctiva/sclera: Conjunctivae normal.   Neck:      Musculoskeletal: Normal range of motion and neck supple. Cardiovascular:      Rate and Rhythm: Normal rate and regular rhythm. Pulmonary:      Effort: Pulmonary effort is normal. No respiratory distress. Breath sounds: Wheezing present. Comments: Patient has significant wheezes diffusely. Abdominal:      General: There is no distension. Palpations: Abdomen is soft. Tenderness: There is no tenderness. Musculoskeletal: Normal range of motion. Skin:     General: Skin is warm and dry. Neurological:      Mental Status: He is alert and oriented to person, place, and time.           Diagnostic Study Results     Labs -     Recent Results (from the past 12 hour(s))   EKG, 12 LEAD, INITIAL    Collection Time: 01/06/20 10:41 AM   Result Value Ref Range    Ventricular Rate 94 BPM    Atrial Rate 94 BPM    P-R Interval 118 ms    QRS Duration 90 ms    Q-T Interval 314 ms    QTC Calculation (Bezet) 392 ms    Calculated P Axis 71 degrees    Calculated R Axis 82 degrees    Calculated T Axis 57 degrees    Diagnosis       Normal sinus rhythm  Normal ECG  No previous ECGs available     CBC WITH AUTOMATED DIFF Collection Time: 01/06/20 11:26 AM   Result Value Ref Range    WBC 22.3 (H) 4.1 - 11.1 K/uL    RBC 4.75 4.10 - 5.70 M/uL    HGB 12.6 12.1 - 17.0 g/dL    HCT 39.7 36.6 - 50.3 %    MCV 83.6 80.0 - 99.0 FL    MCH 26.5 26.0 - 34.0 PG    MCHC 31.7 30.0 - 36.5 g/dL    RDW 16.6 (H) 11.5 - 14.5 %    PLATELET 035 871 - 002 K/uL    MPV 9.1 8.9 - 12.9 FL    NRBC 0.0 0  WBC    ABSOLUTE NRBC 0.00 0.00 - 0.01 K/uL    NEUTROPHILS 88 (H) 32 - 75 %    LYMPHOCYTES 5 (L) 12 - 49 %    MONOCYTES 5 5 - 13 %    EOSINOPHILS 1 0 - 7 %    BASOPHILS 0 0 - 1 %    IMMATURE GRANULOCYTES 1 (H) 0.0 - 0.5 %    ABS. NEUTROPHILS 19.6 (H) 1.8 - 8.0 K/UL    ABS. LYMPHOCYTES 1.2 0.8 - 3.5 K/UL    ABS. MONOCYTES 1.0 0.0 - 1.0 K/UL    ABS. EOSINOPHILS 0.2 0.0 - 0.4 K/UL    ABS. BASOPHILS 0.1 0.0 - 0.1 K/UL    ABS. IMM. GRANS. 0.2 (H) 0.00 - 0.04 K/UL    DF AUTOMATED     METABOLIC PANEL, COMPREHENSIVE    Collection Time: 01/06/20 11:26 AM   Result Value Ref Range    Sodium 133 (L) 136 - 145 mmol/L    Potassium 4.7 3.5 - 5.1 mmol/L    Chloride 100 97 - 108 mmol/L    CO2 28 21 - 32 mmol/L    Anion gap 5 5 - 15 mmol/L    Glucose 135 (H) 65 - 100 mg/dL    BUN 27 (H) 6 - 20 MG/DL    Creatinine 1.30 0.70 - 1.30 MG/DL    BUN/Creatinine ratio 21 (H) 12 - 20      GFR est AA >60 >60 ml/min/1.73m2    GFR est non-AA 55 (L) >60 ml/min/1.73m2    Calcium 8.8 8.5 - 10.1 MG/DL    Bilirubin, total 1.2 (H) 0.2 - 1.0 MG/DL    ALT (SGPT) 22 12 - 78 U/L    AST (SGOT) 14 (L) 15 - 37 U/L    Alk.  phosphatase 68 45 - 117 U/L    Protein, total 6.6 6.4 - 8.2 g/dL    Albumin 3.4 (L) 3.5 - 5.0 g/dL    Globulin 3.2 2.0 - 4.0 g/dL    A-G Ratio 1.1 1.1 - 2.2     CK W/ REFLX CKMB    Collection Time: 01/06/20 11:26 AM   Result Value Ref Range    CK 46 39 - 308 U/L   TROPONIN I    Collection Time: 01/06/20 11:26 AM   Result Value Ref Range    Troponin-I, Qt. 0.14 (H) <0.05 ng/mL   NT-PRO BNP    Collection Time: 01/06/20 11:26 AM   Result Value Ref Range    NT pro- (H) <125 PG/ML   POC LACTIC ACID    Collection Time: 01/06/20 12:14 PM   Result Value Ref Range    Lactic Acid (POC) 0.85 0.40 - 2.00 mmol/L       Radiologic Studies -   XR CHEST PA LAT   Final Result   IMPRESSION: Patchy left lower lobe consolidation. CT Results  (Last 48 hours)    None        CXR Results  (Last 48 hours)               01/06/20 1155  XR CHEST PA LAT Final result    Impression:  IMPRESSION: Patchy left lower lobe consolidation. Narrative:  EXAM: XR CHEST PA LAT       INDICATION: chest pain, shortness of breath       COMPARISON: 8/24/2019. FINDINGS: PA and lateral radiographs of the chest demonstrate subtle patchy   consolidation in the basilar left lower lobe. The lungs are otherwise clear. There is no pneumothorax or pleural effusion. Cardiac size remains normal.   Atherosclerotic calcification of the thoracic aorta is again demonstrated though   mediastinal and hilar contours remain normal. There are 2 adjacent mild-moderate   vertebral compression fractures in the lower lumbar spine again shown. Medical Decision Making   I am the first provider for this patient. I reviewed the vital signs, available nursing notes, past medical history, past surgical history, family history and social history. Vital Signs-Reviewed the patient's vital signs. Patient Vitals for the past 12 hrs:   Temp Pulse Resp BP SpO2   01/06/20 1438  87 15  96 %   01/06/20 1037 98.3 °F (36.8 °C) 98 18 (!) 126/107 91 %       Records Reviewed: Nursing Notes and Old Medical Records    Provider Notes (Medical Decision Making):   Patient presenting with chest pain that wakes him up in the middle of the night. Possibly related to vasospasms after the stent was placed given it started after that, versus ACS, clotted off stent, asthma exacerbation, pneumonia. Will get labs, chest x-ray. ED Course:   Initial assessment performed.  The patients presenting problems have been discussed, and they are in agreement with the care plan formulated and outlined with them. I have encouraged them to ask questions as they arise throughout their visit. ED Course as of Jan 06 1619   Mon Jan 06, 2020   1205 Patient's white count returned at 22,000 and chest x-ray returned as left-sided pneumonia. Did have a high suspicion for this. Blood cultures and POC lactate ordered. He has no sepsis vitals as he is afebrile with no tachycardia so he possibly can go home with antibiotics. [JS]   Sena 7 with Dr. Zamzam Santos, cardiology who states that patient should come into the hospital for serial troponins and managing of his pna as well as heart pain. [JS]   1325 EKG interpreted by me. Shows normal sinus rhythm with a heart rate of 94. No ST elevations or depressions concerning for ischemia. [JS]      ED Course User Index  [JS] Dusty Littlejohn MD     Critical Care Time:   0    Disposition:  Discharge Note:  The patient has been re-evaluated and is ready for discharge. Reviewed available results with patient. Counseled patient on diagnosis and care plan. Patient has expressed understanding, and all questions have been answered. Patient agrees with plan and agrees to follow up as recommended, or to return to the ED if their symptoms worsen. Discharge instructions have been provided and explained to the patient, along with reasons to return to the ED. PLAN:  Current Discharge Medication List        2. Follow-up Information     Follow up With Specialties Details Why Contact Info    Patient's Own Medication is located in the Patient's:  [address] [place] [address]            3. Return to ED if worse     Diagnosis     Clinical Impression:   1. Community acquired pneumonia of left lower lobe of lung ([address]    2. Moderate persistent asthma with acute exacerbation    3. Troponin level elevated        Attestations:    Red Barraza M.D.         Please note that this dictation was completed with [employer], the computer voice recognition software. Quite often unanticipated grammatical, syntax, homophones, and other interpretive errors are inadvertently transcribed by the computer software. Please disregard these errors. Please excuse any errors that have escaped final proofreading. Thank you.

## 2020-01-06 NOTE — H&P
Hospitalist Admission Note    NAME: Betsy Blackman   :  1950   MRN:  [id number]     Date/Time:  2020 3:08 PM    Patient PCP: None  ______________________________________________________________________  Given the patient's current clinical presentation, I have a high level of concern for decompensation if discharged from the emergency department. Complex decision making was performed, which includes reviewing the patient's available past medical records, laboratory results, and x-ray films. My assessment of this patient's clinical condition and my plan of care is as follows. Assessment / Plan:  LLL Pneumonia: has leukocytosis but this may be related to steroids, no other signs of sepsis, will start CTX/Z-max, bronchodilators, mucolytics, steroids, check sputum. COPD Exacerbation: actively wheezing, c/w treatment as above. CAD/Increased Troponin: will check serial enzymes and ECHO, c/w Plavix, give one dose of Lovenox, Cardiology plans for cath tomorrow noted. DM type 2 with hyperglycemia: will place on SSI, check HbA1C, hold metformin for now. SOFÍA: will hold ACE and Metformin for now, give gentle hydration and monitor. HTN: c/w Diltiazem, hold ACE, use labetalol prn  Code Status: Full Code  Surrogate Decision Maker: sister Ramses Parker [phone] or [phone]  DVT Prophylaxis: Lovenox  GI Prophylaxis: not indicated  Baseline: independent lives in [place]      Subjective:   CHIEF COMPLAINT: \"I feel SOB, have chest pain and cough\"    HISTORY OF PRESENT ILLNESS:     Jeny Arshad is a 81 y.o.  male  with a history of CAD, hypertension, presenting for chest pain. Patient states that he had an LAD stent placed  by his cardiologist in [place]. He is currently visiting his sisters here in [place]. States that since his stent placement has been waking up in the middle of the night with chest pain, pounding chest pains, shortness of breath.   Did speak with his cardiologist who told him it possibly is due to vasospasms of the coronary arteries after stent placement. Has been taking nitroglycerin with some relief. Patient states that last night he was again woken up in the middle of his sleep because of chest pounding and pain as well as shortness of breath. Took a nitroglycerin under his tongue which did not relieve the pain. States currently not having any pain but frustrated by these pains. He does have a history of asthma and did not take his nebulizer treatment today. States it is associated with chills, but denies any fevers. Asked about cough, patient does state that in the last 3 days has been having an increased cough. No rhinorrhea or congestion. AT this time patient is lying in bed c/o SOB, cough with yellow sputum, malaise, chest pain, denies N/V no diarrhea, no urinary symptoms, no other associated symptoms. We were asked to admit for work up and evaluation of the above problems. Past Medical History:   Diagnosis Date    Asthma     Diabetes ([facility] [address]     Hypertension     Ill-defined condition     lumbar fusion, cervical fusion        Past Surgical History:   Procedure Laterality Date    HX ORTHOPAEDIC      Lumbar and cervical fusion.  HX OTHER SURGICAL      Morphine pump placed in back       Social History     Tobacco Use    Smoking status: Smoker, Current Status Unknown    Smokeless tobacco: Never Used   Substance Use Topics    Alcohol use: No        Family History   Problem Relation Age of Onset    Cancer Mother     Stroke Father      No Known Allergies     Prior to Admission medications    Medication Sig Start Date End Date Taking? Authorizing Provider   clopidogrel (PLAVIX) 75 mg tab Take 75 mg by mouth daily. Yes Provider, Historical   vit C/E/Zn/coppr/lutein/zeaxan (PRESERVISION AREDS-2 PO) Take 1 Tab by mouth two (2) times a day. Yes Provider, Historical   hydromorphone HCl/PF (DILAUDID, PF, INJECTION) by Injection route.  Pt. Has a dilaudid pain pump. Yes Provider, Historical   IV INFUSION PUMP by Does Not Apply route. Pt has dilaudid pain pump   Yes Provider, Historical   predniSONE (DELTASONE) 10 mg tablet Take 10 mg by mouth daily. Yes Provider, Historical   temazepam (RESTORIL) 30 mg capsule Take 30 mg by mouth nightly as needed for Sleep. Yes Provider, Historical   metFORMIN (GLUCOPHAGE) 500 mg tablet Take 500 mg by mouth two (2) times daily (with meals). Yes Provider, Historical   cyclobenzaprine (FLEXERIL) 10 mg tablet Take 10 mg by mouth three (3) times daily as needed for Muscle Spasm(s). Yes Provider, Historical   enalapril (VASOTEC) 10 mg tablet Take 10 mg by mouth two (2) times a day. Yes Provider, Historical   diltiazem XR (DILACOR XR) 180 mg XR capsule Take 180 mg by mouth daily. Yes Other, MD Chavo   atorvastatin (LIPITOR) 40 mg tablet Take 40 mg by mouth daily. Yes Other, MD Chavo   apremilast 30 mg tab Take 30 mg by mouth two (2) times a day. 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   Yes Provider, Historical   oxyCODONE-acetaminophen (PERCOCET 10)  mg per tablet Take 1 Tab by mouth every four (4) hours as needed for Pain. Indications: PAIN   Yes Provider, Historical   budesonide-formoterol (SYMBICORT) 160-4.5 mcg/actuation HFA inhaler Take 2 Puffs by inhalation daily. Yes Provider, Historical       REVIEW OF SYSTEMS:     I am not able to complete the review of systems because:    The patient is intubated and sedated    The patient has altered mental status due to his acute medical problems    The patient has baseline aphasia from prior stroke(s)    The patient has baseline dementia and is not reliable historian    The patient is in acute medical distress and unable to provide information           Total of 12 systems reviewed as follows:       POSITIVE= underlined text  Negative = text not underlined  General:  fever, chills, sweats, generalized weakness, weight loss/gain,      loss of appetite   Eyes:    blurred vision, eye pain, loss of vision, double vision  ENT:    rhinorrhea, pharyngitis   Respiratory:   cough, sputum production, SOB, LUNA, wheezing, pleuritic pain   Cardiology:   chest pain, palpitations, orthopnea, PND, edema, syncope   Gastrointestinal:  abdominal pain , N/V, diarrhea, dysphagia, constipation, bleeding   Genitourinary:  frequency, urgency, dysuria, hematuria, incontinence   Muskuloskeletal :  arthralgia, myalgia, back pain  Hematology:  easy bruising, nose or gum bleeding, lymphadenopathy   Dermatological: rash, ulceration, pruritis, color change / jaundice  Endocrine:   hot flashes or polydipsia   Neurological:  headache, dizziness, confusion, focal weakness, paresthesia,     Speech difficulties, memory loss, gait difficulty  Psychological: Feelings of anxiety, depression, agitation    Objective:   VITALS:    Visit Vitals  BP (!) 126/107 (BP 1 Location: Left arm, BP Patient Position: Sitting)   Pulse 87   Temp 98.3 °F (36.8 °C)   Resp 15   Ht 6' 1\" (1.854 m)   Wt 93 kg (205 lb)   SpO2 96%   BMI 27.05 kg/m²       PHYSICAL EXAM:    General:    Alert, cooperative, SOB, appears stated age. HEENT: Atraumatic, anicteric sclerae, pink conjunctivae     No oral ulcers, mucosa moist, throat clear, dentition poor   Neck:  Supple, symmetrical,  thyroid: non tender  Lungs:   Coarse BS, rhonchi and wheezing in both sides  Chest wall:  No tenderness  No Accessory muscle use. Heart:   Regular  rhythm,  No  murmur   No edema  Abdomen:   Soft, non-tender. Not distended. Bowel sounds normal  Extremities: No cyanosis. No clubbing,      Skin turgor normal, Capillary refill normal, Radial  pulse 2+  Skin:     Not pale. Not Jaundiced  No rashes   Psych:  Good insight. Not depressed. Not anxious or agitated. Neurologic: EOMs intact. No facial asymmetry. No aphasia or slurred speech. Symmetrical strength, Sensation grossly intact. Alert and oriented X 4. _______________________________________________________________________  Care Plan discussed with:    Comments   Patient y    Family      RN y    Care Manager                    Consultant:  [facility]   _______________________________________________________________________  Expected  Disposition:   Home with Family y   HH/PT/OT/RN    SNF/LTC    BRANDEE    ________________________________________________________________________  TOTAL TIME:  61 Minutes    Critical Care Provided     Minutes non procedure based      Comments    y Reviewed previous records   >50% of visit spent in counseling and coordination of care y Discussion with patient and/or family and questions answered       ________________________________________________________________________  Signed: Shahzad Perez MD    Procedures: see electronic medical records for all procedures/Xrays and details which were not copied into this note but were reviewed prior to creation of Plan.     LAB DATA REVIEWED:    Recent Results (from the past 24 hour(s))   EKG, 12 LEAD, INITIAL    Collection Time: 01/06/20 10:41 AM   Result Value Ref Range    Ventricular Rate 94 BPM    Atrial Rate 94 BPM    P-R Interval 118 ms    QRS Duration 90 ms    Q-T Interval 314 ms    QTC Calculation (Bezet) 392 ms    Calculated P Axis 71 degrees    Calculated R Axis 82 degrees    Calculated T Axis 57 degrees    Diagnosis       Normal sinus rhythm  Normal ECG  No previous ECGs available     CBC WITH AUTOMATED DIFF    Collection Time: 01/06/20 11:26 AM   Result Value Ref Range    WBC 22.3 (H) 4.1 - 11.1 K/uL    RBC 4.75 4.10 - 5.70 M/uL    HGB 12.6 12.1 - 17.0 g/dL    HCT 39.7 36.6 - 50.3 %    MCV 83.6 80.0 - 99.0 FL    MCH 26.5 26.0 - 34.0 PG    MCHC 31.7 30.0 - 36.5 g/dL    RDW 16.6 (H) 11.5 - 14.5 %    PLATELET 025 324 - 428 K/uL    MPV 9.1 8.9 - 12.9 FL    NRBC 0.0 0  WBC    ABSOLUTE NRBC 0.00 0.00 - 0.01 K/uL    NEUTROPHILS 88 (H) 32 - 75 %    LYMPHOCYTES 5 (L) 12 - 49 % MONOCYTES 5 5 - 13 %    EOSINOPHILS 1 0 - 7 %    BASOPHILS 0 0 - 1 %    IMMATURE GRANULOCYTES 1 (H) 0.0 - 0.5 %    ABS. NEUTROPHILS 19.6 (H) 1.8 - 8.0 K/UL    ABS. LYMPHOCYTES 1.2 0.8 - 3.5 K/UL    ABS. MONOCYTES 1.0 0.0 - 1.0 K/UL    ABS. EOSINOPHILS 0.2 0.0 - 0.4 K/UL    ABS. BASOPHILS 0.1 0.0 - 0.1 K/UL    ABS. IMM. GRANS. 0.2 (H) 0.00 - 0.04 K/UL    DF AUTOMATED     METABOLIC PANEL, COMPREHENSIVE    Collection Time: 01/06/20 11:26 AM   Result Value Ref Range    Sodium 133 (L) 136 - 145 mmol/L    Potassium 4.7 3.5 - 5.1 mmol/L    Chloride 100 97 - 108 mmol/L    CO2 28 21 - 32 mmol/L    Anion gap 5 5 - 15 mmol/L    Glucose 135 (H) 65 - 100 mg/dL    BUN 27 (H) 6 - 20 MG/DL    Creatinine 1.30 0.70 - 1.30 MG/DL    BUN/Creatinine ratio 21 (H) 12 - 20      GFR est AA >60 >60 ml/min/1.73m2    GFR est non-AA 55 (L) >60 ml/min/1.73m2    Calcium 8.8 8.5 - 10.1 MG/DL    Bilirubin, total 1.2 (H) 0.2 - 1.0 MG/DL    ALT (SGPT) 22 12 - 78 U/L    AST (SGOT) 14 (L) 15 - 37 U/L    Alk.  phosphatase 68 45 - 117 U/L    Protein, total 6.6 6.4 - 8.2 g/dL    Albumin 3.4 (L) 3.5 - 5.0 g/dL    Globulin 3.2 2.0 - 4.0 g/dL    A-G Ratio 1.1 1.1 - 2.2     CK W/ REFLX CKMB    Collection Time: 01/06/20 11:26 AM   Result Value Ref Range    CK 46 39 - 308 U/L   TROPONIN I    Collection Time: 01/06/20 11:26 AM   Result Value Ref Range    Troponin-I, Qt. 0.14 (H) <0.05 ng/mL   NT-PRO BNP    Collection Time: 01/06/20 11:26 AM   Result Value Ref Range    NT pro- (H) <125 PG/ML   POC LACTIC ACID    Collection Time: 01/06/20 12:14 PM   Result Value Ref Range    Lactic Acid (POC) 0.85 0.40 - 2.00 mmol/L

## 2020-01-06 NOTE — PROGRESS NOTES
Pharmacy Clarification of the Prior to Admission Medication Regimen Retrospective to the Admission Medication Reconciliation    The patient was interviewed regarding clarification of the prior to admission medication regimen. Patient's ex wife was present in room and obtained permission from patient to discuss drug regimen with visitor(s) present. Patient was questioned regarding use of any other inhalers, topical products, over the counter medications, herbal medications, vitamin products or ophthalmic/nasal/otic medication use. Information Obtained From: RX Query, Patient    Recommendations/Findings: The following amendments were made to the patient's active medication list on file at [facility]:     1) Additions:   clopidogrel (PLAVIX) 75 mg tab  IV INFUSION PUMP  hydromorphone HCl/PF (DILAUDID, PF, INJECTION)  vit C/E/Zn/coppr/lutein/zeaxan (PRESERVISION AREDS-2 PO)    2) Removals:   Enlapril 5 mg    3) Changes:  atorvastatin (LIPITOR) tablet (Old regimen: (strength 20 mg) 10 mg daily /New regimen: (strength 40 mg) 40 mg daily)  enalapril (VASOTEC) 10 mg tablet (Old regimen: 10 mg daily /New regimen: 10 mg BID)    4) Pertinent Pharmacy Findings:  apremilast 30 mg tab: Patient has this agent in the room with him. Patient has a pain pump that has Dilaudid. Patient sees Dr. Anna Downey every 45 days at [address] in [facility] [phone]. Patient had pump last filled on 12/20/19. PTA medication list was corrected to the following:     Prior to Admission Medications   Prescriptions Last Dose Informant Taking? IV INFUSION PUMP 1/6/2020 at Unknown time Self Yes   Sig: by Does Not Apply route. Pt has dilaudid pain pump   apremilast 30 mg tab 1/6/2020 at Unknown time Self Yes   Sig: Take 30 mg by mouth two (2) times a day. 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 budesonide-formoterol (SYMBICORT) 160-4.5 mcg/actuation HFA inhaler 1/5/2020 at Unknown time Self Yes   Sig: Take 2 Puffs by inhalation daily. clopidogrel (PLAVIX) 75 mg tab 1/6/2020 at Unknown time Self Yes   Sig: Take 75 mg by mouth daily. cyclobenzaprine (FLEXERIL) 10 mg tablet 1/5/2020 at Unknown time Self Yes   Sig: Take 10 mg by mouth three (3) times daily as needed for Muscle Spasm(s). diltiazem XR (DILACOR XR) 180 mg XR capsule 1/6/2020 at Unknown time Self Yes   Sig: Take 180 mg by mouth daily. enalapril (VASOTEC) 10 mg tablet 1/5/2020 at Unknown time Self Yes   Sig: Take 10 mg by mouth two (2) times a day. hydromorphone HCl/PF (DILAUDID, PF, INJECTION) 1/6/2020 at Unknown time Self Yes   Sig: by Injection route. Pt. Has a dilaudid pain pump.   metFORMIN (GLUCOPHAGE) 500 mg tablet 1/6/2020 at Unknown time Self Yes   Sig: Take 500 mg by mouth two (2) times daily (with meals). oxyCODONE-acetaminophen (PERCOCET 10)  mg per tablet 1/5/2020 at Unknown time Self Yes   Sig: Take 1 Tab by mouth every four (4) hours as needed for Pain. Indications: PAIN   predniSONE (DELTASONE) 10 mg tablet 1/6/2020 at Unknown time Self Yes   Sig: Take 10 mg by mouth daily. temazepam (RESTORIL) 30 mg capsule 12/6/2019 at Unknown time Self Yes   Sig: Take 30 mg by mouth nightly as needed for Sleep.   vit C/E/Zn/coppr/lutein/zeaxan (PRESERVISION AREDS-2 PO) 1/5/2020 at Unknown time Self Yes   Sig: Take 1 Tab by mouth two (2) times a day.       Facility-Administered Medications: None          Thank you,  Leeann Low  Medication History Pharmacy Technician

## 2020-01-06 NOTE — Clinical Note
TRANSFER - OUT REPORT:     Verbal report given to: Martha Gorman RN. Report consisted of patient's Situation, Background, Assessment and   Recommendations(SBAR). Opportunity for questions and clarification was provided. Patient transported to: [facility].

## 2020-01-06 NOTE — Clinical Note
Guidewire inserted Into LAD TwoCal HN through bolus feeds via PEG @240ml/hr every 6 hours provides 960 ml, 1920 kcal, and 80 g protein + Derik 1 pckg per day provides 80 kcal and 14 g protein; total provides 2000 kcal and 94 g protein (33kcal/kg and 1.6 g/kg protein based on current weight 59.9 kg). Spoke to provider.

## 2020-01-07 ENCOUNTER — APPOINTMENT (OUTPATIENT)
Dept: NON INVASIVE DIAGNOSTICS | Age: 70
DRG: 246 | End: 2020-01-07
Attending: INTERNAL MEDICINE
Payer: MEDICARE

## 2020-01-07 PROBLEM — Z98.890 S/P CARDIAC CATH: Status: ACTIVE | Noted: 2020-01-07

## 2020-01-07 LAB
ALBUMIN SERPL-MCNC: 3 G/DL (ref 3.5–5)
ALBUMIN/GLOB SERPL: 0.9 {RATIO} (ref 1.1–2.2)
ALP SERPL-CCNC: 64 U/L (ref 45–117)
ALT SERPL-CCNC: 21 U/L (ref 12–78)
ANION GAP SERPL CALC-SCNC: 8 MMOL/L (ref 5–15)
AST SERPL-CCNC: 11 U/L (ref 15–37)
ATRIAL RATE: 61 BPM
AV VELOCITY RATIO: 0.71
AV VTI RATIO: 0.7
BASOPHILS # BLD: 0 K/UL (ref 0–0.1)
BASOPHILS NFR BLD: 0 % (ref 0–1)
BILIRUB SERPL-MCNC: 0.6 MG/DL (ref 0.2–1)
BUN SERPL-MCNC: 31 MG/DL (ref 6–20)
BUN/CREAT SERPL: 30 (ref 12–20)
CALCIUM SERPL-MCNC: 8.7 MG/DL (ref 8.5–10.1)
CALCULATED P AXIS, ECG09: 74 DEGREES
CALCULATED R AXIS, ECG10: 68 DEGREES
CALCULATED T AXIS, ECG11: 67 DEGREES
CHLORIDE SERPL-SCNC: 101 MMOL/L (ref 97–108)
CO2 SERPL-SCNC: 25 MMOL/L (ref 21–32)
COMMENT, HOLDF: NORMAL
CREAT SERPL-MCNC: 1.04 MG/DL (ref 0.7–1.3)
DIAGNOSIS, 93000: NORMAL
DIFFERENTIAL METHOD BLD: ABNORMAL
ECHO AO ROOT DIAM: 3.34 CM
ECHO AO SINUS VALSALVA DIAM: 3.46 CM
ECHO AV AREA PEAK VELOCITY: 2.5 CM2
ECHO AV AREA VTI: 2.5 CM2
ECHO AV AREA/BSA PEAK VELOCITY: 1.1 CM2/M2
ECHO AV AREA/BSA VTI: 1.1 CM2/M2
ECHO AV CUSP MM: 1.62 CM
ECHO AV MEAN GRADIENT: 8.1 MMHG
ECHO AV MEAN VELOCITY: 1.33 M/S
ECHO AV PEAK GRADIENT: 14.8 MMHG
ECHO AV PEAK VELOCITY: 192.4 CM/S
ECHO AV VTI: 44.87 CM
ECHO LA AREA 4C: 30.9 CM2
ECHO LA MAJOR AXIS: 3.5 CM
ECHO LA TO AORTIC ROOT RATIO: 1.05
ECHO LA VOL 2C: 124.26 ML (ref 18–58)
ECHO LA VOL 4C: 97.97 ML (ref 18–58)
ECHO LA VOL BP: 127.75 ML (ref 18–58)
ECHO LA VOL/BSA BIPLANE: 56.37 ML/M2 (ref 16–28)
ECHO LA VOLUME INDEX A2C: 54.83 ML/M2 (ref 16–28)
ECHO LA VOLUME INDEX A4C: 43.23 ML/M2 (ref 16–28)
ECHO LV E' LATERAL VELOCITY: 6.56 CM/S
ECHO LV E' SEPTAL VELOCITY: 10.38 CM/S
ECHO LV EDV TEICHHOLZ: 61.98 ML
ECHO LV ESV TEICHHOLZ: 31.09 ML
ECHO LV INTERNAL DIMENSION DIASTOLIC: 5.3 CM (ref 4.2–5.9)
ECHO LV INTERNAL DIMENSION SYSTOLIC: 3.95 CM
ECHO LV IVSD: 1.37 CM (ref 0.6–1)
ECHO LV IVSS: 1.56 CM
ECHO LV MASS 2D: 357.3 G (ref 88–224)
ECHO LV MASS INDEX 2D: 157.7 G/M2 (ref 49–115)
ECHO LV POSTERIOR WALL DIASTOLIC: 1.29 CM (ref 0.6–1)
ECHO LV POSTERIOR WALL SYSTOLIC: 1.67 CM
ECHO LVOT CARDIAC OUTPUT: 8 L/MIN
ECHO LVOT DIAM: 2.1 CM
ECHO LVOT PEAK GRADIENT: 7.5 MMHG
ECHO LVOT PEAK VELOCITY: 136.92 CM/S
ECHO LVOT SV: 111.2 ML
ECHO LVOT VTI: 32.06 CM
ECHO MV A VELOCITY: 83.59 CM/S
ECHO MV E DECELERATION TIME (DT): 252.2 MS
ECHO MV E VELOCITY: 62.65 CM/S
ECHO MV E/A RATIO: 0.75
ECHO MV E/E' LATERAL: 9.55
ECHO MV E/E' RATIO (AVERAGED): 7.79
ECHO MV E/E' SEPTAL: 6.04
ECHO MV REGURGITANT PEAK GRADIENT: 91.1 MMHG
ECHO MV REGURGITANT PEAK VELOCITY: 477.21 CM/S
ECHO RV INTERNAL DIMENSION: 4.77 CM
EOSINOPHIL # BLD: 0 K/UL (ref 0–0.4)
EOSINOPHIL NFR BLD: 0 % (ref 0–7)
ERYTHROCYTE [DISTWIDTH] IN BLOOD BY AUTOMATED COUNT: 16.5 % (ref 11.5–14.5)
EST. AVERAGE GLUCOSE BLD GHB EST-MCNC: 146 MG/DL
GLOBULIN SER CALC-MCNC: 3.3 G/DL (ref 2–4)
GLUCOSE BLD STRIP.AUTO-MCNC: 188 MG/DL (ref 65–100)
GLUCOSE BLD STRIP.AUTO-MCNC: 208 MG/DL (ref 65–100)
GLUCOSE BLD STRIP.AUTO-MCNC: 251 MG/DL (ref 65–100)
GLUCOSE BLD STRIP.AUTO-MCNC: 332 MG/DL (ref 65–100)
GLUCOSE SERPL-MCNC: 239 MG/DL (ref 65–100)
HBA1C MFR BLD: 6.7 % (ref 4–5.6)
HCT VFR BLD AUTO: 35.8 % (ref 36.6–50.3)
HGB BLD-MCNC: 11.3 G/DL (ref 12.1–17)
IMM GRANULOCYTES # BLD AUTO: 0.2 K/UL (ref 0–0.04)
IMM GRANULOCYTES NFR BLD AUTO: 1 % (ref 0–0.5)
LVFS 2D: 25.49 %
LVOT MG: 3.92 MMHG
LVOT MV: 0.93 CM/S
LVSV (TEICH): 30.88 ML
LYMPHOCYTES # BLD: 0.4 K/UL (ref 0.8–3.5)
LYMPHOCYTES NFR BLD: 2 % (ref 12–49)
MAGNESIUM SERPL-MCNC: 2.4 MG/DL (ref 1.6–2.4)
MCH RBC QN AUTO: 26.2 PG (ref 26–34)
MCHC RBC AUTO-ENTMCNC: 31.6 G/DL (ref 30–36.5)
MCV RBC AUTO: 82.9 FL (ref 80–99)
MONOCYTES # BLD: 0.2 K/UL (ref 0–1)
MONOCYTES NFR BLD: 1 % (ref 5–13)
MV DEC SLOPE: 2.55
NEUTS SEG # BLD: 17 K/UL (ref 1.8–8)
NEUTS SEG NFR BLD: 96 % (ref 32–75)
NRBC # BLD: 0 K/UL (ref 0–0.01)
NRBC BLD-RTO: 0 PER 100 WBC
P-R INTERVAL, ECG05: 134 MS
PLATELET # BLD AUTO: 221 K/UL (ref 150–400)
PMV BLD AUTO: 9.3 FL (ref 8.9–12.9)
POTASSIUM SERPL-SCNC: 4.1 MMOL/L (ref 3.5–5.1)
PROT SERPL-MCNC: 6.3 G/DL (ref 6.4–8.2)
Q-T INTERVAL, ECG07: 418 MS
QRS DURATION, ECG06: 100 MS
QTC CALCULATION (BEZET), ECG08: 420 MS
RBC # BLD AUTO: 4.32 M/UL (ref 4.1–5.7)
RBC MORPH BLD: ABNORMAL
SAMPLES BEING HELD,HOLD: NORMAL
SERVICE CMNT-IMP: ABNORMAL
SODIUM SERPL-SCNC: 134 MMOL/L (ref 136–145)
TROPONIN I SERPL-MCNC: 0.15 NG/ML
TSH SERPL DL<=0.05 MIU/L-ACNC: 0.51 UIU/ML (ref 0.36–3.74)
VENTRICULAR RATE, ECG03: 61 BPM
WBC # BLD AUTO: 17.8 K/UL (ref 4.1–11.1)

## 2020-01-07 PROCEDURE — C1894 INTRO/SHEATH, NON-LASER: HCPCS | Performed by: INTERNAL MEDICINE

## 2020-01-07 PROCEDURE — 94640 AIRWAY INHALATION TREATMENT: CPT

## 2020-01-07 PROCEDURE — 87070 CULTURE OTHR SPECIMN AEROBIC: CPT

## 2020-01-07 PROCEDURE — 77030012468 HC VLV BLEEDBK CNTRL ABBT -B: Performed by: INTERNAL MEDICINE

## 2020-01-07 PROCEDURE — 77030013529 HC DEV PLLBK SLED BSC -B: Performed by: INTERNAL MEDICINE

## 2020-01-07 PROCEDURE — 74011000258 HC RX REV CODE- 258: Performed by: INTERNAL MEDICINE

## 2020-01-07 PROCEDURE — C1753 CATH, INTRAVAS ULTRASOUND: HCPCS | Performed by: INTERNAL MEDICINE

## 2020-01-07 PROCEDURE — C1769 GUIDE WIRE: HCPCS | Performed by: INTERNAL MEDICINE

## 2020-01-07 PROCEDURE — 74011250637 HC RX REV CODE- 250/637: Performed by: INTERNAL MEDICINE

## 2020-01-07 PROCEDURE — B240ZZ3 ULTRASONOGRAPHY OF SINGLE CORONARY ARTERY, INTRAVASCULAR: ICD-10-PCS | Performed by: INTERNAL MEDICINE

## 2020-01-07 PROCEDURE — 74011250636 HC RX REV CODE- 250/636: Performed by: INTERNAL MEDICINE

## 2020-01-07 PROCEDURE — 85025 COMPLETE CBC W/AUTO DIFF WBC: CPT

## 2020-01-07 PROCEDURE — 93005 ELECTROCARDIOGRAM TRACING: CPT

## 2020-01-07 PROCEDURE — 77030019569 HC BND COMPR RAD TERU -B: Performed by: INTERNAL MEDICINE

## 2020-01-07 PROCEDURE — 93458 L HRT ARTERY/VENTRICLE ANGIO: CPT | Performed by: INTERNAL MEDICINE

## 2020-01-07 PROCEDURE — 74011636637 HC RX REV CODE- 636/637: Performed by: INTERNAL MEDICINE

## 2020-01-07 PROCEDURE — 74011250637 HC RX REV CODE- 250/637: Performed by: NURSE PRACTITIONER

## 2020-01-07 PROCEDURE — 74011250636 HC RX REV CODE- 250/636: Performed by: NURSE PRACTITIONER

## 2020-01-07 PROCEDURE — 99152 MOD SED SAME PHYS/QHP 5/>YRS: CPT | Performed by: INTERNAL MEDICINE

## 2020-01-07 PROCEDURE — C1874 STENT, COATED/COV W/DEL SYS: HCPCS | Performed by: INTERNAL MEDICINE

## 2020-01-07 PROCEDURE — 82962 GLUCOSE BLOOD TEST: CPT

## 2020-01-07 PROCEDURE — 92978 ENDOLUMINL IVUS OCT C 1ST: CPT | Performed by: INTERNAL MEDICINE

## 2020-01-07 PROCEDURE — 99153 MOD SED SAME PHYS/QHP EA: CPT | Performed by: INTERNAL MEDICINE

## 2020-01-07 PROCEDURE — B2151ZZ FLUOROSCOPY OF LEFT HEART USING LOW OSMOLAR CONTRAST: ICD-10-PCS | Performed by: INTERNAL MEDICINE

## 2020-01-07 PROCEDURE — 97161 PT EVAL LOW COMPLEX 20 MIN: CPT

## 2020-01-07 PROCEDURE — B2111ZZ FLUOROSCOPY OF MULTIPLE CORONARY ARTERIES USING LOW OSMOLAR CONTRAST: ICD-10-PCS | Performed by: INTERNAL MEDICINE

## 2020-01-07 PROCEDURE — 77030019697 HC SYR ANGI INFL MRTM -B: Performed by: INTERNAL MEDICINE

## 2020-01-07 PROCEDURE — 83735 ASSAY OF MAGNESIUM: CPT

## 2020-01-07 PROCEDURE — C1887 CATHETER, GUIDING: HCPCS | Performed by: INTERNAL MEDICINE

## 2020-01-07 PROCEDURE — 027034Z DILATION OF CORONARY ARTERY, ONE ARTERY WITH DRUG-ELUTING INTRALUMINAL DEVICE, PERCUTANEOUS APPROACH: ICD-10-PCS | Performed by: INTERNAL MEDICINE

## 2020-01-07 PROCEDURE — 65660000000 HC RM CCU STEPDOWN

## 2020-01-07 PROCEDURE — 4A023N7 MEASUREMENT OF CARDIAC SAMPLING AND PRESSURE, LEFT HEART, PERCUTANEOUS APPROACH: ICD-10-PCS | Performed by: INTERNAL MEDICINE

## 2020-01-07 PROCEDURE — 83036 HEMOGLOBIN GLYCOSYLATED A1C: CPT

## 2020-01-07 PROCEDURE — 77030008543 HC TBNG MON PRSS MRTM -A: Performed by: INTERNAL MEDICINE

## 2020-01-07 PROCEDURE — 77030010221 HC SPLNT WR POS TELE -B: Performed by: INTERNAL MEDICINE

## 2020-01-07 PROCEDURE — 74011000250 HC RX REV CODE- 250: Performed by: INTERNAL MEDICINE

## 2020-01-07 PROCEDURE — 36415 COLL VENOUS BLD VENIPUNCTURE: CPT

## 2020-01-07 PROCEDURE — 84484 ASSAY OF TROPONIN QUANT: CPT

## 2020-01-07 PROCEDURE — 97116 GAIT TRAINING THERAPY: CPT

## 2020-01-07 PROCEDURE — 84443 ASSAY THYROID STIM HORMONE: CPT

## 2020-01-07 PROCEDURE — 77030028837 HC SYR ANGI PWR INJ COEU -A: Performed by: INTERNAL MEDICINE

## 2020-01-07 PROCEDURE — 87077 CULTURE AEROBIC IDENTIFY: CPT

## 2020-01-07 PROCEDURE — 92928 PRQ TCAT PLMT NTRAC ST 1 LES: CPT | Performed by: INTERNAL MEDICINE

## 2020-01-07 PROCEDURE — 77030004549 HC CATH ANGI DX PRF MRTM -A: Performed by: INTERNAL MEDICINE

## 2020-01-07 PROCEDURE — 87186 SC STD MICRODIL/AGAR DIL: CPT

## 2020-01-07 PROCEDURE — 77030019698 HC SYR ANGI MDLON MRTM -A: Performed by: INTERNAL MEDICINE

## 2020-01-07 PROCEDURE — 77030015766: Performed by: INTERNAL MEDICINE

## 2020-01-07 PROCEDURE — 74011000250 HC RX REV CODE- 250: Performed by: HOSPITALIST

## 2020-01-07 PROCEDURE — C1725 CATH, TRANSLUMIN NON-LASER: HCPCS | Performed by: INTERNAL MEDICINE

## 2020-01-07 PROCEDURE — 80053 COMPREHEN METABOLIC PANEL: CPT

## 2020-01-07 PROCEDURE — 77030018729 HC ELECTRD DEFIB PAD CARD -B: Performed by: INTERNAL MEDICINE

## 2020-01-07 PROCEDURE — 93306 TTE W/DOPPLER COMPLETE: CPT

## 2020-01-07 DEVICE — XIENCE SIERRA™ EVEROLIMUS ELUTING CORONARY STENT SYSTEM 3.50 MM X 18 MM / RAPID-EXCHANGE
Type: IMPLANTABLE DEVICE | Status: FUNCTIONAL
Brand: XIENCE SIERRA™

## 2020-01-07 RX ORDER — PREDNISONE 20 MG/1
40 TABLET ORAL
Status: DISCONTINUED | OUTPATIENT
Start: 2020-01-08 | End: 2020-01-08 | Stop reason: HOSPADM

## 2020-01-07 RX ORDER — MIDAZOLAM HYDROCHLORIDE 1 MG/ML
INJECTION, SOLUTION INTRAMUSCULAR; INTRAVENOUS AS NEEDED
Status: DISCONTINUED | OUTPATIENT
Start: 2020-01-07 | End: 2020-01-07 | Stop reason: HOSPADM

## 2020-01-07 RX ORDER — HEPARIN SODIUM 200 [USP'U]/100ML
INJECTION, SOLUTION INTRAVENOUS
Status: COMPLETED | OUTPATIENT
Start: 2020-01-07 | End: 2020-01-07

## 2020-01-07 RX ORDER — HEPARIN SODIUM 1000 [USP'U]/ML
INJECTION, SOLUTION INTRAVENOUS; SUBCUTANEOUS AS NEEDED
Status: DISCONTINUED | OUTPATIENT
Start: 2020-01-07 | End: 2020-01-07 | Stop reason: HOSPADM

## 2020-01-07 RX ORDER — VERAPAMIL HYDROCHLORIDE 2.5 MG/ML
INJECTION, SOLUTION INTRAVENOUS AS NEEDED
Status: DISCONTINUED | OUTPATIENT
Start: 2020-01-07 | End: 2020-01-07 | Stop reason: HOSPADM

## 2020-01-07 RX ORDER — ASPIRIN 81 MG/1
81 TABLET ORAL DAILY
Status: DISCONTINUED | OUTPATIENT
Start: 2020-01-07 | End: 2020-01-08 | Stop reason: HOSPADM

## 2020-01-07 RX ORDER — FENTANYL CITRATE 50 UG/ML
INJECTION, SOLUTION INTRAMUSCULAR; INTRAVENOUS AS NEEDED
Status: DISCONTINUED | OUTPATIENT
Start: 2020-01-07 | End: 2020-01-07 | Stop reason: HOSPADM

## 2020-01-07 RX ORDER — LIDOCAINE HYDROCHLORIDE 10 MG/ML
INJECTION, SOLUTION EPIDURAL; INFILTRATION; INTRACAUDAL; PERINEURAL AS NEEDED
Status: DISCONTINUED | OUTPATIENT
Start: 2020-01-07 | End: 2020-01-07 | Stop reason: HOSPADM

## 2020-01-07 RX ADMIN — GUAIFENESIN 400 MG: 200 SOLUTION ORAL at 11:13

## 2020-01-07 RX ADMIN — IPRATROPIUM BROMIDE AND ALBUTEROL SULFATE 3 ML: .5; 3 SOLUTION RESPIRATORY (INHALATION) at 02:21

## 2020-01-07 RX ADMIN — GUAIFENESIN 400 MG: 200 SOLUTION ORAL at 18:50

## 2020-01-07 RX ADMIN — DILTIAZEM HYDROCHLORIDE 180 MG: 180 CAPSULE, COATED, EXTENDED RELEASE ORAL at 11:13

## 2020-01-07 RX ADMIN — TEMAZEPAM 30 MG: 15 CAPSULE ORAL at 23:22

## 2020-01-07 RX ADMIN — OXYCODONE AND ACETAMINOPHEN 1 TABLET: 10; 325 TABLET ORAL at 06:44

## 2020-01-07 RX ADMIN — ARFORMOTEROL TARTRATE 15 MCG: 15 SOLUTION RESPIRATORY (INHALATION) at 20:16

## 2020-01-07 RX ADMIN — OXYCODONE AND ACETAMINOPHEN 1 TABLET: 10; 325 TABLET ORAL at 00:56

## 2020-01-07 RX ADMIN — INSULIN LISPRO 3 UNITS: 100 INJECTION, SOLUTION INTRAVENOUS; SUBCUTANEOUS at 21:27

## 2020-01-07 RX ADMIN — TICAGRELOR 90 MG: 90 TABLET ORAL at 21:18

## 2020-01-07 RX ADMIN — IPRATROPIUM BROMIDE AND ALBUTEROL SULFATE 3 ML: .5; 3 SOLUTION RESPIRATORY (INHALATION) at 15:08

## 2020-01-07 RX ADMIN — ATORVASTATIN CALCIUM 40 MG: 40 TABLET, FILM COATED ORAL at 11:13

## 2020-01-07 RX ADMIN — SODIUM CHLORIDE 100 ML/HR: 900 INJECTION, SOLUTION INTRAVENOUS at 04:30

## 2020-01-07 RX ADMIN — INSULIN LISPRO 7 UNITS: 100 INJECTION, SOLUTION INTRAVENOUS; SUBCUTANEOUS at 17:39

## 2020-01-07 RX ADMIN — METHYLPREDNISOLONE SODIUM SUCCINATE 60 MG: 125 INJECTION, POWDER, FOR SOLUTION INTRAMUSCULAR; INTRAVENOUS at 06:39

## 2020-01-07 RX ADMIN — METHYLPREDNISOLONE SODIUM SUCCINATE 60 MG: 125 INJECTION, POWDER, FOR SOLUTION INTRAMUSCULAR; INTRAVENOUS at 00:41

## 2020-01-07 RX ADMIN — METOPROLOL TARTRATE 25 MG: 25 TABLET ORAL at 21:18

## 2020-01-07 RX ADMIN — OXYCODONE AND ACETAMINOPHEN 1 TABLET: 10; 325 TABLET ORAL at 21:18

## 2020-01-07 RX ADMIN — CEFTRIAXONE 1 G: 1 INJECTION, POWDER, FOR SOLUTION INTRAMUSCULAR; INTRAVENOUS at 18:50

## 2020-01-07 RX ADMIN — INSULIN LISPRO 3 UNITS: 100 INJECTION, SOLUTION INTRAVENOUS; SUBCUTANEOUS at 13:35

## 2020-01-07 RX ADMIN — METHYLPREDNISOLONE SODIUM SUCCINATE 60 MG: 125 INJECTION, POWDER, FOR SOLUTION INTRAMUSCULAR; INTRAVENOUS at 13:36

## 2020-01-07 RX ADMIN — CLOPIDOGREL BISULFATE 75 MG: 75 TABLET ORAL at 06:40

## 2020-01-07 RX ADMIN — METOPROLOL TARTRATE 25 MG: 25 TABLET ORAL at 06:40

## 2020-01-07 RX ADMIN — ASPIRIN 81 MG: 81 TABLET, COATED ORAL at 06:47

## 2020-01-07 RX ADMIN — BUDESONIDE 500 MCG: 0.5 INHALANT RESPIRATORY (INHALATION) at 20:17

## 2020-01-07 RX ADMIN — AZITHROMYCIN MONOHYDRATE 500 MG: 250 TABLET ORAL at 11:13

## 2020-01-07 NOTE — PROGRESS NOTES
[id number] [address]  [phone]      Cardiology Progress Note      1/7/2020 1:06 PM    Admit Date: 1/6/2020    Admit Diagnosis:   Pneumonia [J18.9]    Subjective:     Zeenat Izaguirre is s/p PCI/PINO to LAD. Has no c/o CP, SOB, but states has not ambulated.      Visit Vitals  /64   Pulse 69   Temp 97.9 °F (36.6 °C)   Resp 18   Ht 6' 1\" (1.854 m)   Wt 95.3 kg (210 lb 1.6 oz)   SpO2 95%   BMI 27.72 kg/m²       Current Facility-Administered Medications   Medication Dose Route Frequency    aspirin delayed-release tablet 81 mg  81 mg Oral DAILY    apremilast tab 30 mg  (Patient Supplied)  30 mg Oral Q12H    atorvastatin (LIPITOR) tablet 40 mg  40 mg Oral DAILY    clopidogrel (PLAVIX) tablet 75 mg  75 mg Oral DAILY    cyclobenzaprine (FLEXERIL) tablet 10 mg  10 mg Oral TID PRN    dilTIAZem CD (CARDIZEM CD) capsule 180 mg  180 mg Oral DAILY    oxyCODONE-acetaminophen (PERCOCET 10)  mg per tablet 1 Tab  1 Tab Oral Q4H PRN    temazepam (RESTORIL) capsule 30 mg  30 mg Oral QHS PRN    cefTRIAXone (ROCEPHIN) 1 g in 0.9% sodium chloride (MBP/ADV) 50 mL  1 g IntraVENous Q24H    azithromycin (ZITHROMAX) tablet 500 mg  500 mg Oral DAILY    guaiFENesin (ROBITUSSIN) 100 mg/5 mL oral liquid 400 mg  400 mg Oral TID    methylPREDNISolone (PF) (Solu-MEDROL) injection 60 mg  60 mg IntraVENous Q6H    acetaminophen (TYLENOL) tablet 650 mg  650 mg Oral Q4H PRN    ondansetron (ZOFRAN) injection 4 mg  4 mg IntraVENous Q6H PRN    insulin lispro (HUMALOG) injection   SubCUTAneous AC&HS    glucose chewable tablet 16 g  4 Tab Oral PRN    glucagon (GLUCAGEN) injection 1 mg  1 mg IntraMUSCular PRN    labetalol (NORMODYNE;TRANDATE) injection 10 mg  10 mg IntraVENous Q6H PRN    HYDROmorphone (PF) (DILAUDID) injection 0.5 mg  0.5 mg IntraVENous Q4H PRN    dextrose 10% infusion 0-250 mL  0-250 mL IntraVENous PRN    albuterol-ipratropium (DUO-NEB) 2.5 MG-0.5 MG/3 ML  3 mL Nebulization Q6H RT    budesonide (PULMICORT) 500 mcg/2 ml nebulizer suspension  500 mcg Nebulization BID RT    And    arformoterol (BROVANA) neb solution 15 mcg  15 mcg Nebulization BID RT    metoprolol tartrate (LOPRESSOR) tablet 25 mg  25 mg Oral Q12H    0.9% sodium chloride infusion  100 mL/hr IntraVENous CONTINUOUS       Objective:      Physical Exam:  General Appearance:  obese  male in no acute distress  Chest:   rhonchi, wheezing  Cardiovascular:  Regular rate and rhythm, no murmur. Abdomen:   Soft, non-tender, bowel sounds are active. Extremities: no peripheral edema; right radial site D/I   Skin:  Warm and dry.      Data Review:   Recent Labs     01/07/20  0035 01/06/20  1126   WBC 17.8* 22.3*   HGB 11.3* 12.6   HCT 35.8* 39.7    243     Recent Labs     01/07/20  0035 01/06/20  1126   * 133*   K 4.1 4.7    100   CO2 25 28   * 135*   BUN 31* 27*   CREA 1.04 1.30   CA 8.7 8.8   MG 2.4  --    ALB 3.0* 3.4*   TBILI 0.6 1.2*   SGOT 11* 14*   ALT 21 22       Recent Labs     01/07/20  0035 01/06/20  1126   TROIQ 0.15* 0.14*         Intake/Output Summary (Last 24 hours) at 1/7/2020 1306  Last data filed at 1/7/2020 8693  Gross per 24 hour   Intake 1280 ml   Output 600 ml   Net 680 ml        Telemetry: SR    Assessment:     Active Problems:    Hypertension (12/22/2015)      Left lower lobe pneumonia ([address] (12/21/2015)      Asthma with acute exacerbation in adult (12/21/2015)      DM type 2 (diabetes mellitus, type 2) ([address] (12/21/2015)      Overview: Diet controlled      COPD (chronic obstructive pulmonary disease) ([facility] (8/24/2019)      Pneumonia (1/6/2020)      Elevated troponin (1/6/2020)      CAD (coronary artery disease) (1/6/2020)      Overview: Oct 2019 PCI/PINO LAD in OSH      S/P cardiac cath (1/7/2020)      Overview: 1/7/2020 PCI/PINO to ostial LM/LAD        Plan:     Elevated troponin:  S/p cardiac cath - patent stents from Oct 2019, received PCI/PINO to ostial LAD today  Change Plavix to Brilinta 90mg BID, continue on ASA 81mg daily, metoprolol, statin     HTN:  D/c Dilt, started BB       [address] Cardiology    1/7/2020         Patient seen, examined by me personally. Plan discussed as detailed. Agree with note as outlined by  NP. I confirm findings in history and physical exam. No additional findings noted. Agree with plan as outlined above.      Amanda Obrien MD

## 2020-01-07 NOTE — PROGRESS NOTES
Problem: Breathing Pattern - Ineffective  Goal: *Absence of hypoxia  Outcome: Progressing Towards Goal     Problem: Falls - Risk of  Goal: *Absence of Falls  Description  Document Stacie Bhatia Fall Risk and appropriate interventions in the flowsheet.   Outcome: Progressing Towards Goal  Note: Fall Risk Interventions:            Medication Interventions: Patient to call before getting OOB

## 2020-01-07 NOTE — PROGRESS NOTES
Hospitalist Progress Note    NAME: Margo Choi   :  1950   MRN:  [id number]       Assessment / Plan:  LLL Pneumonia: has leukocytosis but this may be related to steroids vs infection, CTX/Z-max, bronchodilators, mucolytics, iv steroids changed to PO, sputum pending. Blood culture NGTD. COPD Exacerbation:  treatment as above. CAD/Increased Troponin: times two trop (0.15 higher one), c/w Plavix/ASA/metoprolol/statin. Cath today  -Cath revealed 90% ostial LAD lesion, hazy. Previous stent patent in proximal LAD. LVEF 60%. PCI of LAD. Switch  to brilinta or effient from plavix. Echo:  · Normal cavity size and diastolic function. Upper normal wall thickness. Low normal systolic dysfunction. Estimated left ventricular ejection fraction is 50 - 55%. · Aortic valve sclerosis with no significant stenosis. DM type 2 with hyperglycemia:  SSI, 6.7 HbA1C, hold metformin for now. SOFÍA: cont to hold ACE and Metformin, give gentle hydration and monitor. HTN: stopped Diltiazem, hold ACE, use labetalol prn -see above  Code Status: Full Code  Surrogate Decision Maker: sister Brook Linder [phone] or [phone]  DVT Prophylaxis: Lovenox    Baseline: independent lives in [place]     Subjective:     Chief Complaint / Reason for Physician Visit  Says he can still hear himself wheezing but is not any SOB more than baseline. No chest pain. Review of Systems:  Symptom Y/N Comments  Symptom Y/N Comments   Fever/Chills n   Chest Pain     Poor Appetite    Edema n    Cough    Abdominal Pain     Sputum    Joint Pain     SOB/LUNA y   Pruritis/Rash     Nausea/vomit n   Tolerating PT/OT     Diarrhea    Tolerating Diet y    Constipation    Other       Could NOT obtain due to:      Objective:     VITALS:   Last 24hrs VS reviewed since prior progress note.  Most recent are:  Patient Vitals for the past 24 hrs:   Temp Pulse Resp BP SpO2   20 1524 97.9 °F (36.6 °C) 77 18 106/79 95 %   20 1400  72  126/62 95 % 01/07/20 1345  72  106/60 95 %   01/07/20 1330  77  129/62 98 %   01/07/20 1315  79  128/59 95 %   01/07/20 1301  76  138/73 98 %   01/07/20 1245    (!) 87/70    01/07/20 1230  69  122/64 95 %   01/07/20 1215  71  134/58 96 %   01/07/20 1200    146/70 96 %   01/07/20 1148  69  131/64 99 %   01/07/20 1125    122/78 98 %   01/07/20 1120  65  112/73 97 %   01/07/20 1117  68  126/67 99 %   01/07/20 1110 97.9 °F (36.6 °C)  18 (!) 138/104 100 %   01/07/20 1100  69  153/78    01/07/20 1045  69  154/77    01/07/20 1030  68  125/72    01/07/20 1015  65  124/81    01/07/20 1000  62  123/61 97 %   01/07/20 0945  62  130/68 98 %   01/07/20 0930 97.5 °F (36.4 °C) 60 18 145/72 98 %   01/07/20 0658 97.8 °F (36.6 °C) 76 18 135/70 94 %   01/07/20 0221 98 °F (36.7 °C) 78 18 138/75 99 %   01/07/20 0053 97.7 °F (36.5 °C) 75 18 116/57 95 %   01/06/20 2012 98.5 °F (36.9 °C) 90 18 129/73 98 %   01/06/20 1920  77 11 102/53 96 %       Intake/Output Summary (Last 24 hours) at 1/7/2020 1732  Last data filed at 1/7/2020 0644  Gross per 24 hour   Intake 1280 ml   Output 600 ml   Net 680 ml        PHYSICAL EXAM:  General: WD, WN. Alert, cooperative, no acute distress    EENT:  NCAT, Anicteric sclerae.  MMM, pink conjunctivae  Resp:  wheezing bilaterally,  No accessory muscle use  CV:  Regular  rhythm,  No edema  GI:  Soft, Non distended, Non tender.  +Bowel sounds  Neurologic:  Alert and oriented X 3, normal speech,   Psych:   Good insight. Not anxious nor agitated  Skin:     No jaundice    Reviewed most current lab test results and cultures  YES  Reviewed most current radiology test results   YES  Review and summation of old records today    NO  Reviewed patient's current orders and MAR    YES  PMH/SH reviewed - no change compared to H&P  ________________________________________________________________________  Care Plan discussed with:    Comments   Patient y    Family  y    RN     Care Manager Consultant                        Multidiciplinary team rounds were held today with , nursing, pharmacist and clinical coordinator. Patient's plan of care was discussed; medications were reviewed and discharge planning was addressed. ________________________________________________________________________  Total NON critical care TIME:     Minutes    Total CRITICAL CARE TIME Spent:   Minutes non procedure based      Comments   >50% of visit spent in counseling and coordination of care     ________________________________________________________________________  Candi Leon MD     Procedures: see electronic medical records for all procedures/Xrays and details which were not copied into this note but were reviewed prior to creation of Plan. LABS:  I reviewed today's most current labs and imaging studies.   Pertinent labs include:  Recent Labs     01/07/20  0035 01/06/20  1126   WBC 17.8* 22.3*   HGB 11.3* 12.6   HCT 35.8* 39.7    243     Recent Labs     01/07/20  0035 01/06/20  1126   * 133*   K 4.1 4.7    100   CO2 25 28   * 135*   BUN 31* 27*   CREA 1.04 1.30   CA 8.7 8.8   MG 2.4  --    ALB 3.0* 3.4*   TBILI 0.6 1.2*   SGOT 11* 14*   ALT 21 22       Signed: Candi Leon MD

## 2020-01-07 NOTE — PROGRESS NOTES
Cath revealed 90% ostial LAD lesion, hazy. Previous stent patent in proximal LAD. LVEF 60%. PCI of LAD. Switch  to brilinta or effient from plavix.

## 2020-01-07 NOTE — PROGRESS NOTES
Physical Therapy  Order received and acknowledged. Pt currently MARC in the cath lab. Will defer PT evaluation at this time and continue to follow.

## 2020-01-07 NOTE — CARDIO/PULMONARY
Cardiac Rehab Note: chart review     S/P PCI/stenting 1/7/2020    Smoking history assessed. Patient is a  smoker. Smoking Cessation Program link has been added to the AVS.     EF 60%  on 1/7 2020 per Dr. Roseline Babin note     CAD education folder, with heart heathy diet, warning signs, heart facts, catheterization brochure, and out patient cardiac rehab program provided to Gisela Steven on 1/7/2020                                              Educated using teach back method. Reviewed CAD diagnosis definition and purpose of intervention. Discussed risk factors for CAD to include the following: family history, elevated BMI, hyperlipidemia, hypertension, diabetes, and stress. Discussed Heart Healthy/Low Sodium (less than 2000 mg) diet. Reviewed the importance of medication compliance(Plavix), follow up appointments with cardiologist, signs and symptoms of angina, and what to report to physician after discharge. Pt lives in [place] and will be seeing his cardiologist there post discharge-states he is active in his large yard. Reminded to do exercise such as walking or join a gym-3-5 times weekly for 30-45 min working up gradually. He states he also has access to [employer] which he states he will check in to. Emphasized the value of cardiac rehab. Discussed Cardiac Rehab Program format, benefits, and encouraged enrollment to assist with risk modification and management. Gisela Steven verbalized understanding with questions answered.   Milton Bailey RN

## 2020-01-07 NOTE — ADT AUTH CERT NOTES
_____________  CLINICAL  _____________    Patient Demographics     Patient Name  Tyrone Lopez  [id number] Sex  Male   1950 Address  [facility]  [address] [place] [id number] Phone  [phone] [facility])  [phone] (Mobile)   CSN:   [id number]   Admit Date: Admit Time Room Bed   2020 11:12 AM 2159 [[id number]]  [[id number]]   Attending Providers     Provider Pager From To   Javon Messer MD  20   Gregoria Zaman MD  20   Flaquita Padilla MD  20    Emergency Contact(s)     Name Relation Home Work [address] (Ex Wife) Emergency Contact [phone]     Utilization Reviews         LOC:Acute Adult-Infection: Pneumonia (2020) by Jory Bhandari         Review Entered Review Status   2020 10:11 In Primary       Criteria Review   REVIEW SUMMARY     Patient: Sam Keller  Review Number: [id number]  Review Status: In Primary     Condition Specific: Yes     Condition Level Of Care Code: INTERMED  Condition Level Of Care Description: Intermediate        OUTCOMES  Outcome Type: Primary           REVIEW DETAILS     Service Date: 2020  Admit Date: 2020  Product: Ania Cabrera Adult  Subset: Infection: Pneumonia      (Symptom or finding within 24h)         (Excludes PO medications unless noted)          [X] Select Day, One:              [X] Episode Day 1, One:                  [X] ACUTE, All:                  ~--Admin, IQ Admin Admin on 2020 10:11 AM--~                  Hospitalist Admission Note                                    with a history of CAD, hypertension, presenting for chest pain. Patient states that he had an LAD stent placed  by his cardiologist in [place]. He is currently visiting his sisters here in [facility]. States that since his stent placement has been waking up in the middle of the night with chest pain, pounding chest pains, shortness of breath.   Did speak with his cardiologist who told him it possibly is due to vasospasms of the coronary arteries after stent placement. Has been taking nitroglycerin with some relief. Patient states that last night he was again woken up in the middle of his sleep because of chest pounding and pain as well as shortness of breath. Took a nitroglycerin under his tongue which did not relieve the pain. States currently not having any pain but frustrated by these pains. He does have a history of asthma and did not take his nebulizer treatment today. States it is associated with chills, but denies any fevers. Asked about cough, patient does state that in the last 3 days has been having an increased cough. Nicolasa Jaquez .AT this time patient is lying in bed c/o SOB, cough with yellow sputum, malaise, chest pain. .. EXAM:  Lungs:             Coarse BS, rhonchi and wheezing in both sides                                    T 98.3, /107, P 87, R 15                                    WBC 22.3, NEUTS 88, , GLUC 135, BUN 27, ALB 3.4, AST 14, TOP-I 0.14, NTpro                                     Assessment / Plan:                  LLL Pneumonia: has leukocytosis but this may be related to steroids, no other signs of sepsis, will start CTX/Z-max, bronchodilators, mucolytics, steroids, check sputum. COPD Exacerbation: actively wheezing, c/w treatment as above. CAD/Increased Troponin: will check serial enzymes and ECHO, c/w Plavix, give one dose of Lovenox, Cardiology plans for cath tomorrow noted. DM type 2 with hyperglycemia: will place on SSI, check HbA1C, hold metformin for now. SOFÍA: will hold ACE and Metformin for now, give gentle hydration and monitor.                   HTN: c/w Diltiazem, hold ACE, use labetalol prn                  Code Status: Full Code                  Surrogate Decision Maker: sister Farida [facility] [address] [phone] or [phone]                  DVT Prophylaxis: Lovenox GI Prophylaxis: not indicated                  Baseline: independent lives in [place], PAIRED BLOOD CULTURES, SPUTUM CULTURE, DIET CARDIAC Regular; Consistent Carb 1800kcal -NPO AFTER MN, ECHO, CARDIOLOGY/OT/PT CONSULTS, OOB W/ASSIST,  DUO NEBS NOW &  Q 6 HRS, ROCEPHIN 1G IV Q 24 HRS,  SOLUMEDROL 125MG IV X1 & 60 MGIV Q 6 HRS, LOPRESSOR 25MG PO Q 12 HRS,  ZITHROMAX 500 MG IV X1, IV NS 50ML/HR, ASPIRIN 81MG PO QD,                                                                            [X] Pneumonia confirmed by imaging                      ~--Admin, IQ Admin Admin on 01- 10:04 AM--~                      CXR: Patchy left lower lobe consolidation.                                                                                         [X] Finding, >= One:                          [X] High risk for adverse event, Both:                              [X] O2 sat 89-91%(0.89-0.91) and < baseline                              ~--Admin, IQ Admin Admin on 01- 10:04 AM--~                              INITIAL 02 SAT 91% RA                                                                                                                        [X] Clinical risk factor, >= One:                                  [X] Chronic obstructive pulmonary disease (COPD), Grade III or IV (current)                                  ~--Admin, IQ Admin Admin on 01- 10:04 AM--~                                  COPD Exacerbation: actively wheezing                                                                                                                                [ ] CURB-65 criteria, >= Two:                              [X] Age >= 65 years                              ~--Admin, IQ Admin Admin on 01- 10:03 AM--~                              AGE 81 [X] Intervention, All:                          [X] Anti-infective (includes PO)                          [X] Oxygenation, One:                              [X] O2 sat >= 92%(0.92) or baseline                              ~--Admin, IQ Admin Admin on 2020 10:03 AM--~                              02 SAT 91- 96% RA                                                                                                                    [X] Oximetry or blood gas                          ~--Admin, IQ Admin Admin on 2020 10:01 AM--~                          OXIMETRY CHECKED SEVERAL X                                                                                                        [X] Deep vein thrombosis (DVT) prophylaxis or patient ambulatory                          ~--Admin, IQ Admin Admin on 2020 10:02 AM--~                          LOVENOX 90 MG SC X1                                                                                   Version: InterQual® 2019  Hyacinth Basile and InterQual®  © 2019 [employer] and/or one of its subsidiaries. All Rights Reserved. CPT only © 2018 American Medical Association. All Rights Reserved.    H&P Notes      H&P by Arnold Holcomb MD at 20 1503 documented on ED to Hosp-Admission (Current) from 2020 in [facility] 2 [facility] CARD[facility]   Author: Arnold Holcomb MD Author Type: Physician Filed: 20 1543   Note Status: Signed Cosign: Cosign Not Required Date of Service: 20 9116   : Arnold Holcomb MD (Physician)   Expand All Collapse All                    Hospitalist Admission Note     NAME:            Stephan Short   :               1950   MRN:               [id number]      Date/Time:      2020 3:08 PM     Patient PCP: None  ______________________________________________________________________  Given the patient's current clinical presentation, I have a high level of concern for decompensation if discharged from the emergency department.  Complex decision making was performed, which includes reviewing the patient's available past medical records, laboratory results, and x-ray films.        My assessment of this patient's clinical condition and my plan of care is as follows.     Assessment / Plan:  LLL Pneumonia: has leukocytosis but this may be related to steroids, no other signs of sepsis, will start CTX/Z-max, bronchodilators, mucolytics, steroids, check sputum. COPD Exacerbation: actively wheezing, c/w treatment as above. CAD/Increased Troponin: will check serial enzymes and ECHO, c/w Plavix, give one dose of Lovenox, Cardiology plans for cath tomorrow noted. DM type 2 with hyperglycemia: will place on SSI, check HbA1C, hold metformin for now. SOFÍA: will hold ACE and Metformin for now, give gentle hydration and monitor. HTN: c/w Diltiazem, hold ACE, use labetalol prn  Code Status: Full Code  Surrogate Decision Maker: sister Arlene Seay [phone] or [phone]  DVT Prophylaxis: Lovenox  GI Prophylaxis: not indicated  Baseline: independent lives in [place]                 Subjective:   CHIEF COMPLAINT: \"I feel SOB, have chest pain and cough\"     HISTORY OF PRESENT ILLNESS:     Sandee Brooks is a 81 y.o.    male  with a history of CAD, hypertension, presenting for chest pain.  Patient states that he had an LAD stent placed October 1 by his cardiologist in [facility] [address] is currently visiting his sisters here in [address] that since his stent placement has been waking up in the middle of the night with chest pain, pounding chest pains, shortness of breath.  Did speak with his cardiologist who told him it possibly is due to vasospasms of the coronary arteries after stent placement.  Has been taking nitroglycerin with some relief.  Patient states that last night he was again woken up in the middle of his sleep because of chest pounding and pain as well as shortness of breath.  Took a nitroglycerin under his tongue which did not relieve the pain.  States currently not having any pain but frustrated by these pains.  He does have a history of asthma and did not take his nebulizer treatment today.  States it is associated with chills, but denies any fevers.  Asked about cough, patient does state that in the last 3 days has been having an increased cough.  No rhinorrhea or congestion. AT this time patient is lying in bed c/o SOB, cough with yellow sputum, malaise, chest pain, denies N/V no diarrhea, no urinary symptoms, no other associated symptoms. We were asked to admit for work up and evaluation of the above problems.           Past Medical History:   Diagnosis Date    Asthma      Diabetes ([facility] [address]      Hypertension      Ill-defined condition       lumbar fusion, cervical fusion               Past Surgical History:   Procedure Laterality Date    HX ORTHOPAEDIC         Lumbar and cervical fusion.  HX OTHER SURGICAL         Morphine pump placed in back         Social History           Tobacco Use    Smoking status: Smoker, Current Status Unknown    Smokeless tobacco: Never Used   Substance Use Topics    Alcohol use: No               Family History   Problem Relation Age of Onset    Cancer Mother      Stroke Father        No Known Allergies              Prior to Admission medications    Medication Sig Start Date End Date Taking? Authorizing Provider   clopidogrel (PLAVIX) 75 mg tab Take 75 mg by mouth daily.     Yes Provider, Historical   vit C/E/Zn/coppr/lutein/zeaxan (PRESERVISION AREDS-2 PO) Take 1 Tab by mouth two (2) times a day.     Yes Provider, Historical   hydromorphone HCl/PF (DILAUDID, PF, INJECTION) by Injection route. Pt. Has a dilaudid pain pump.     Yes Provider, Historical   IV INFUSION PUMP by Does Not Apply route.  Pt has dilaudid pain pump     Yes Provider, Historical   predniSONE (DELTASONE) 10 mg tablet Take 10 mg by mouth daily.     Yes Provider, Historical   temazepam (RESTORIL) 30 mg capsule Take 30 mg by mouth nightly as needed for Sleep.     Yes Provider, Historical   metFORMIN (GLUCOPHAGE) 500 mg tablet Take 500 mg by mouth two (2) times daily (with meals).     Yes Provider, Historical   cyclobenzaprine (FLEXERIL) 10 mg tablet Take 10 mg by mouth three (3) times daily as needed for Muscle Spasm(s).     Yes Provider, Historical   enalapril (VASOTEC) 10 mg tablet Take 10 mg by mouth two (2) times a day.     Yes Provider, Historical   diltiazem XR (DILACOR XR) 180 mg XR capsule Take 180 mg by mouth daily.     Yes Other, MD Chavo   atorvastatin (LIPITOR) 40 mg tablet Take 40 mg by mouth daily.     Yes Other, MD Chavo   apremilast 30 mg tab Take 30 mg by mouth two (2) times a day. 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     Yes Provider, Historical   oxyCODONE-acetaminophen (PERCOCET 10)  mg per tablet Take 1 Tab by mouth every four (4) hours as needed for Pain.  Indications: PAIN     Yes Provider, Historical   budesonide-formoterol (SYMBICORT) 160-4.5 mcg/actuation HFA inhaler Take 2 Puffs by inhalation daily.     Yes Provider, Historical         REVIEW OF SYSTEMS:     I am not able to complete the review of systems because:    The patient is intubated and sedated     The patient has altered mental status due to his acute medical problems     The patient has baseline aphasia from prior stroke(s)     The patient has baseline dementia and is not reliable historian     The patient is in acute medical distress and unable to provide information               Total of 12 systems reviewed as follows:                                        POSITIVE= underlined text  Negative = text not underlined  General:                     fever, chills, sweats, generalized weakness, weight loss/gain,                                       loss of appetite   Eyes:                           blurred vision, eye pain, loss of vision, double vision  ENT:                            rhinorrhea, pharyngitis   Respiratory:               cough, sputum production, SOB, LUNA, wheezing, pleuritic pain   Cardiology:                chest pain, palpitations, orthopnea, PND, edema, syncope   Gastrointestinal:       abdominal pain , N/V, diarrhea, dysphagia, constipation, bleeding   Genitourinary:           frequency, urgency, dysuria, hematuria, incontinence   Muskuloskeletal :      arthralgia, myalgia, back pain  Hematology:              easy bruising, nose or gum bleeding, lymphadenopathy   Dermatological:         rash, ulceration, pruritis, color change / jaundice  Endocrine:                 hot flashes or polydipsia   Neurological:             headache, dizziness, confusion, focal weakness, paresthesia,                                      Speech difficulties, memory loss, gait difficulty  Psychological:          Feelings of anxiety, depression, agitation     Objective:   VITALS:    Visit Vitals  BP (!) 126/107 (BP 1 Location: Left arm, BP Patient Position: Sitting)   Pulse 87   Temp 98.3 °F (36.8 °C)   Resp 15   Ht 6' 1\" (1.854 m)   Wt 93 kg (205 lb)   SpO2 96%   BMI 27.05 kg/m²         PHYSICAL EXAM:     General:          Alert, cooperative, SOB, appears stated age. HEENT:           Atraumatic, anicteric sclerae, pink conjunctivae                          No oral ulcers, mucosa moist, throat clear, dentition poor   Neck:               Supple, symmetrical,  thyroid: non tender  Lungs:             Coarse BS, rhonchi and wheezing in both sides  Chest wall:      No tenderness  No Accessory muscle use. Heart:              Regular  rhythm,  No  murmur   No edema  Abdomen:        Soft, non-tender. Not distended. Bowel sounds normal  Extremities:     No cyanosis. No clubbing,                            Skin turgor normal, Capillary refill normal, Radial  pulse 2+  Skin:                Not pale. Not Jaundiced  No rashes   Psych:             Good insight. Not depressed. Not anxious or agitated.   Neurologic: EOMs intact. No facial asymmetry. No aphasia or slurred speech. Symmetrical strength, Sensation grossly intact.  Alert and oriented X 4.      _______________________________________________________________________  Care Plan discussed with:      Comments   Patient y     Family        RN y     Care Manager                      Consultant:  young Cardiology   _______________________________________________________________________  Expected  Disposition:   Home with Family y   HH/PT/OT/RN     SNF/LTC     BRANDEE     ________________________________________________________________________  TOTAL TIME:  61 Minutes     Critical Care Provided     Minutes non procedure based         Comments     y Reviewed previous records   >50% of visit spent in counseling and coordination of care y Discussion with patient and/or family and questions answered         ________________________________________________________________________  Signed: Siri Dan MD     Procedures: see electronic medical records for all procedures/Xrays and details which were not copied into this note but were reviewed prior to creation of Plan.     LAB DATA REVIEWED:    Recent Results          Recent Results (from the past 24 hour(s))   EKG, 12 LEAD, INITIAL     Collection Time: 01/06/20 10:41 AM   Result Value Ref Range     Ventricular Rate 94 BPM     Atrial Rate 94 BPM     P-R Interval 118 ms     QRS Duration 90 ms     Q-T Interval 314 ms     QTC Calculation (Bezet) 392 ms     Calculated P Axis 71 degrees     Calculated R Axis 82 degrees     Calculated T Axis 57 degrees     Diagnosis           Normal sinus rhythm  Normal ECG  No previous ECGs available      CBC WITH AUTOMATED DIFF     Collection Time: 01/06/20 11:26 AM   Result Value Ref Range     WBC 22.3 (H) 4.1 - 11.1 K/uL     RBC 4.75 4.10 - 5.70 M/uL     HGB 12.6 12.1 - 17.0 g/dL     HCT 39.7 36.6 - 50.3 %     MCV 83.6 80.0 - 99.0 FL     MCH 26.5 26.0 - 34.0 PG     MCHC 31.7 30.0 - 36.5 g/dL     RDW 16.6 (H) 11.5 - 14.5 %     PLATELET 107 373 - 145 K/uL     MPV 9.1 8.9 - 12.9 FL     NRBC 0.0 0  WBC     ABSOLUTE NRBC 0.00 0.00 - 0.01 K/uL     NEUTROPHILS 88 (H) 32 - 75 %     LYMPHOCYTES 5 (L) 12 - 49 %     MONOCYTES 5 5 - 13 %     EOSINOPHILS 1 0 - 7 %     BASOPHILS 0 0 - 1 %     IMMATURE GRANULOCYTES 1 (H) 0.0 - 0.5 %     ABS. NEUTROPHILS 19.6 (H) 1.8 - 8.0 K/UL     ABS. LYMPHOCYTES 1.2 0.8 - 3.5 K/UL     ABS. MONOCYTES 1.0 0.0 - 1.0 K/UL     ABS. EOSINOPHILS 0.2 0.0 - 0.4 K/UL     ABS. BASOPHILS 0.1 0.0 - 0.1 K/UL     ABS. IMM. GRANS. 0.2 (H) 0.00 - 0.04 K/UL     DF AUTOMATED     METABOLIC PANEL, COMPREHENSIVE     Collection Time: 01/06/20 11:26 AM   Result Value Ref Range     Sodium 133 (L) 136 - 145 mmol/L     Potassium 4.7 3.5 - 5.1 mmol/L     Chloride 100 97 - 108 mmol/L     CO2 28 21 - 32 mmol/L     Anion gap 5 5 - 15 mmol/L     Glucose 135 (H) 65 - 100 mg/dL     BUN 27 (H) 6 - 20 MG/DL     Creatinine 1.30 0.70 - 1.30 MG/DL     BUN/Creatinine ratio 21 (H) 12 - 20       GFR est AA >60 >60 ml/min/1.73m2     GFR est non-AA 55 (L) >60 ml/min/1.73m2     Calcium 8.8 8.5 - 10.1 MG/DL     Bilirubin, total 1.2 (H) 0.2 - 1.0 MG/DL     ALT (SGPT) 22 12 - 78 U/L     AST (SGOT) 14 (L) 15 - 37 U/L     Alk.  phosphatase 68 45 - 117 U/L     Protein, total 6.6 6.4 - 8.2 g/dL     Albumin 3.4 (L) 3.5 - 5.0 g/dL     Globulin 3.2 2.0 - 4.0 g/dL     A-G Ratio 1.1 1.1 - 2.2     CK W/ REFLX CKMB     Collection Time: 01/06/20 11:26 AM   Result Value Ref Range     CK 46 39 - 308 U/L   TROPONIN I     Collection Time: 01/06/20 11:26 AM   Result Value Ref Range     Troponin-I, Qt. 0.14 (H) <0.05 ng/mL   NT-PRO BNP     Collection Time: 01/06/20 11:26 AM   Result Value Ref Range     NT pro- (H) <125 PG/ML   POC LACTIC ACID     Collection Time: 01/06/20 12:14 PM   Result Value Ref Range     Lactic Acid (POC) 0.85 0.40 - 2.00 mmol/L

## 2020-01-07 NOTE — PROGRESS NOTES
Occupational Therapy  Orders received and medical record reviewed. Nurse cleared pt for therapy and reports that he's been up in his room. Upon arrival, pt reports that he has no concerns re: returning home. He does not wish to participate in OT at this time. Pt is independent at baseline and lives alone. Encouraged pt to balance rest and activity at home during adls and he agrees. Will sign off at this time as pt reports that he has no OT needs at this time. Thank you for the consult.    6 minutes

## 2020-01-07 NOTE — PROGRESS NOTES
TRANSFER - IN REPORT:    Verbal report received from Sejal Lal (name) on [facility]  being received from ED (unit) for routine progression of care      Report consisted of patients Situation, Background, Assessment and   Recommendations(SBAR). Information from the following report(s) SBAR was reviewed with the receiving nurse. Opportunity for questions and clarification was provided. Assessment completed upon patients arrival to unit and care assumed. 2012  Patient arrived on the floor via wheelchair. Vital signs stable    2130  CHG bath, right radial site prepped. Patient refused groin prep.     2200  Consent signed    [place] to send Solu-medrol    0600  CHG bath for am done  Glucose checked    0722  Patient being picked up by two CCL nurses, patient talking to the transporting nurses

## 2020-01-07 NOTE — PROGRESS NOTES
Occupational Therapy  Orders received and medical record reviewed. Pt is off the floor in CCL. Will defer and continue to follow as appropriate.

## 2020-01-07 NOTE — PROGRESS NOTES
PHYSICAL THERAPY EVALUATION/DISCHARGE  Patient: Oliverio Bishop (89 y.o. male)  Date: 1/7/2020  Primary Diagnosis: Pneumonia [J18.9]  Procedure(s) (LRB):  LEFT HEART CATH / CORONARY ANGIOGRAPHY (N/A)  Percutaneous Coronary Intervention (N/A)  Coronary Angiography (N/A)  Angioplasty Coronary (N/A)  Insert Stent Aren Coronary (N/A)  Intravascular Ultrasound (N/A) Day of Surgery   Precautions: no lifting with RUE         ASSESSMENT  Based on the objective data described below, the patient presents with baseline I function. No deficits noted that warrant PT intervention at this time. Pt aware of his precautions and adhering to them during session. VSS t/o session, with slight SOB noted after completing a flight of stairs. Pt safe to return home when medically stable. .    Functional Outcome Measure: The patient scored 100/100 on the Barthel Index outcome measure which is indicative of 0 impairment. Other factors to consider for discharge: none     Further skilled acute physical therapy is not indicated at this time. PLAN :  Recommendation for discharge: (in order for the patient to meet his/her long term goals)  No skilled physical therapy/ follow up rehabilitation needs identified at this time. This discharge recommendation:  Has been made in collaboration with the attending provider and/or case management    IF patient discharges home will need the following DME: none       SUBJECTIVE:   Patient stated I feel a little tired that's all.     OBJECTIVE DATA SUMMARY:   HISTORY:    Past Medical History:   Diagnosis Date    Asthma     CAD (coronary artery disease) 1/6/2020    Oct 2019 [facility]    Diabetes [facility])     Hypertension     Ill-defined condition     lumbar fusion, cervical fusion    S/P cardiac cath 1/7/2020 1/7/2020 PCI/AREN to ostial LM/LAD     Past Surgical History:   Procedure Laterality Date    HX ORTHOPAEDIC      Lumbar and cervical fusion.     HX OTHER SURGICAL      Morphine pump placed in back       Prior level of function: I at baseline, driving  Personal factors and/or comorbidities impacting plan of care: none    Home Situation  Home Environment: Private residence  # Steps to Enter: 6  Rails to Enter: Yes  Hand Rails : Left  One/Two Story Residence: Two story(sister's home)  Interior Rails: Both  Living Alone: No  Support Systems: Family member(s)  Patient Expects to be Discharged to[de-identified] Private residence  Current DME Used/Available at Home: Glucometer, Nebulizer  Tub or Shower Type: Tub/Shower combination    EXAMINATION/PRESENTATION/DECISION MAKING:   Critical Behavior:              Hearing: Auditory  Auditory Impairment: None  Range Of Motion:  AROM: Within functional limits           PROM: Within functional limits           Strength:    Strength: Within functional limits(RUE not tested)                    Tone & Sensation:   Tone: Normal              Sensation: Intact               Coordination:  Coordination: Within functional limits  Functional Mobility:  Bed Mobility:  Rolling: Independent  Supine to Sit: Independent  Sit to Supine: Independent  Scooting: Independent  Transfers:  Sit to Stand: Independent  Stand to Sit: Independent                       Balance:   Sitting: Intact  Standing: Intact  Ambulation/Gait Training:  Distance (ft): 200 Feet (ft)  Assistive Device: Gait belt  Ambulation - Level of Assistance: Independent     Gait Description (WDL): Within defined limits                 Speed/[place]: Pace decreased (<100 feet/min)    Stairs:  Number of Stairs Trained: 24  Stairs - Level of Assistance: Modified independent   Rail Use: Left         Functional Measure:  Barthel Index:    Bathin  Bladder: 10  Bowels: 10  Groomin  Dressing: 10  Feeding: 10  Mobility: 15  Stairs: 10  Toilet Use: 10  Transfer (Bed to Chair and Back): 15  Total: 100/100       The Barthel ADL Index: Guidelines  1.  The index should be used as a record of what a patient does, not as a record of what a patient could do. 2. The main aim is to establish degree of independence from any help, physical or verbal, however minor and for whatever reason. 3. The need for supervision renders the patient not independent. 4. A patient's performance should be established using the best available evidence. Asking the patient, friends/relatives and nurses are the usual sources, but direct observation and common sense are also important. However direct testing is not needed. 5. Usually the patient's performance over the preceding 24-48 hours is important, but occasionally longer periods will be relevant. 6. Middle categories imply that the patient supplies over 50 per cent of the effort. 7. Use of aids to be independent is allowed. Ernesto Crabtree., Barthel, D.W. (1757). Functional evaluation: the Barthel Index. [address] [facility] (14)2. RADHA Berg, Ezekiel Gallo., Cherelle Wilson., [place], [address] [place] [address] (1999). Measuring the change indisability after inpatient rehabilitation; comparison of the responsiveness of the Barthel Index and Functional [place] Measure. Journal of Neurology, Neurosurgery, and Psychiatry, 66(4), 840-544. Bob Dior, N.J.A, John Wray  W.J.M, & Rory Medellin, M.A. (2004.) Assessment of post-stroke quality of life in cost-effectiveness studies: The usefulness of the Barthel Index and the EuroQoL-5D.  Quality of Life Research, 15, 734-60          Physical Therapy Evaluation Charge Determination   History Examination Presentation Decision-Making   LOW Complexity : Zero comorbidities / personal factors that will impact the outcome / POC LOW Complexity : 1-2 Standardized tests and measures addressing body structure, function, activity limitation and / or participation in recreation  LOW Complexity : Stable, uncomplicated  LOW Complexity : FOTO score of       Based on the above components, the patient evaluation is determined to be of the following complexity level: LOW Pain Ratin/10 low back    Activity Tolerance:   Good  Please refer to the flowsheet for vital signs taken during this treatment. After treatment patient left in no apparent distress:   Sitting in chair    COMMUNICATION/EDUCATION:   The patients plan of care was discussed with: Registered Nurse. Fall prevention education was provided and the patient/caregiver indicated understanding.     Thank you for this referral.  Kaden York, PT

## 2020-01-07 NOTE — PROGRESS NOTES
ADULT PROTOCOL: JET AEROSOL  REASSESSMENT    Patient  Nabila Erwin     81 y.o.   male     1/7/2020  3:46 AM    Breath Sounds Pre Procedure: Right Breath Sounds: Diminished                               Left Breath Sounds: Diminished    Breath Sounds Post Procedure:                                        Breathing pattern: Pre procedure Breathing Pattern: Regular          Post procedure Breathing Pattern: Regular    Heart Rate: Pre procedure Pulse: 72           Post procedure Pulse: 74    Resp Rate: Pre procedure Respirations: 18           Post procedure Respirations: 18    Peak Flow: Pre bronchodilator             Post bronchodilator       FVC/FEV1:        Incentive Spirometry:             Cough: Pre procedure Cough: Non-productive               Post procedure Cough: Non-productive    Suctioned: NO    Sputum: Pre procedure                   Post procedure      Oxygen: O2 Device: Room air        Changed: NO    SpO2: Pre procedure SpO2: 99 %   without oxygen              Post procedure SpO2: 99 %  without oxygen    Nebulizer Therapy: Current medications Aerosolized Medications: DuoNeb      Changed: NO    Smoking History: current smoker    Problem List:   Patient Active Problem List   Diagnosis Code    Left lower lobe pneumonia ([facility] [address] J18.1    Asthma with acute exacerbation in adult J45. 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

## 2020-01-08 VITALS
RESPIRATION RATE: 18 BRPM | HEART RATE: 75 BPM | WEIGHT: 225.97 LBS | OXYGEN SATURATION: 96 % | TEMPERATURE: 97.9 F | BODY MASS INDEX: 29.95 KG/M2 | SYSTOLIC BLOOD PRESSURE: 147 MMHG | HEIGHT: 73 IN | DIASTOLIC BLOOD PRESSURE: 75 MMHG

## 2020-01-08 LAB
ANION GAP SERPL CALC-SCNC: 9 MMOL/L (ref 5–15)
BASOPHILS # BLD: 0 K/UL (ref 0–0.1)
BASOPHILS NFR BLD: 0 % (ref 0–1)
BUN SERPL-MCNC: 28 MG/DL (ref 6–20)
BUN/CREAT SERPL: 32 (ref 12–20)
CALCIUM SERPL-MCNC: 8.9 MG/DL (ref 8.5–10.1)
CHLORIDE SERPL-SCNC: 102 MMOL/L (ref 97–108)
CO2 SERPL-SCNC: 25 MMOL/L (ref 21–32)
CREAT SERPL-MCNC: 0.88 MG/DL (ref 0.7–1.3)
DIFFERENTIAL METHOD BLD: ABNORMAL
EOSINOPHIL # BLD: 0 K/UL (ref 0–0.4)
EOSINOPHIL NFR BLD: 0 % (ref 0–7)
ERYTHROCYTE [DISTWIDTH] IN BLOOD BY AUTOMATED COUNT: 16.3 % (ref 11.5–14.5)
GLUCOSE BLD STRIP.AUTO-MCNC: 247 MG/DL (ref 65–100)
GLUCOSE BLD STRIP.AUTO-MCNC: 303 MG/DL (ref 65–100)
GLUCOSE SERPL-MCNC: 199 MG/DL (ref 65–100)
HCT VFR BLD AUTO: 37.9 % (ref 36.6–50.3)
HGB BLD-MCNC: 12.1 G/DL (ref 12.1–17)
IMM GRANULOCYTES # BLD AUTO: 0.2 K/UL (ref 0–0.04)
IMM GRANULOCYTES NFR BLD AUTO: 1 % (ref 0–0.5)
LYMPHOCYTES # BLD: 0.5 K/UL (ref 0.8–3.5)
LYMPHOCYTES NFR BLD: 2 % (ref 12–49)
MCH RBC QN AUTO: 26.5 PG (ref 26–34)
MCHC RBC AUTO-ENTMCNC: 31.9 G/DL (ref 30–36.5)
MCV RBC AUTO: 82.9 FL (ref 80–99)
MONOCYTES # BLD: 0.5 K/UL (ref 0–1)
MONOCYTES NFR BLD: 2 % (ref 5–13)
NEUTS SEG # BLD: 22.3 K/UL (ref 1.8–8)
NEUTS SEG NFR BLD: 95 % (ref 32–75)
NRBC # BLD: 0 K/UL (ref 0–0.01)
NRBC BLD-RTO: 0 PER 100 WBC
PLATELET # BLD AUTO: 243 K/UL (ref 150–400)
PMV BLD AUTO: 9.3 FL (ref 8.9–12.9)
POTASSIUM SERPL-SCNC: 4.4 MMOL/L (ref 3.5–5.1)
RBC # BLD AUTO: 4.57 M/UL (ref 4.1–5.7)
RBC MORPH BLD: ABNORMAL
SERVICE CMNT-IMP: ABNORMAL
SERVICE CMNT-IMP: ABNORMAL
SODIUM SERPL-SCNC: 136 MMOL/L (ref 136–145)
WBC # BLD AUTO: 23.5 K/UL (ref 4.1–11.1)

## 2020-01-08 PROCEDURE — 74011250637 HC RX REV CODE- 250/637: Performed by: NURSE PRACTITIONER

## 2020-01-08 PROCEDURE — 74011250636 HC RX REV CODE- 250/636: Performed by: INTERNAL MEDICINE

## 2020-01-08 PROCEDURE — 80048 BASIC METABOLIC PNL TOTAL CA: CPT

## 2020-01-08 PROCEDURE — 74011636637 HC RX REV CODE- 636/637: Performed by: INTERNAL MEDICINE

## 2020-01-08 PROCEDURE — 82962 GLUCOSE BLOOD TEST: CPT

## 2020-01-08 PROCEDURE — 74011000250 HC RX REV CODE- 250: Performed by: HOSPITALIST

## 2020-01-08 PROCEDURE — 74011000250 HC RX REV CODE- 250: Performed by: INTERNAL MEDICINE

## 2020-01-08 PROCEDURE — 36415 COLL VENOUS BLD VENIPUNCTURE: CPT

## 2020-01-08 PROCEDURE — 87040 BLOOD CULTURE FOR BACTERIA: CPT

## 2020-01-08 PROCEDURE — 74011250637 HC RX REV CODE- 250/637: Performed by: INTERNAL MEDICINE

## 2020-01-08 PROCEDURE — 85025 COMPLETE CBC W/AUTO DIFF WBC: CPT

## 2020-01-08 PROCEDURE — 94640 AIRWAY INHALATION TREATMENT: CPT

## 2020-01-08 PROCEDURE — 74011636320 HC RX REV CODE- 636/320: Performed by: INTERNAL MEDICINE

## 2020-01-08 RX ORDER — PREDNISONE 10 MG/1
TABLET ORAL
Qty: 20 TAB | Refills: 0 | Status: SHIPPED | OUTPATIENT
Start: 2020-01-08

## 2020-01-08 RX ORDER — METFORMIN HYDROCHLORIDE 500 MG/1
500 TABLET ORAL 2 TIMES DAILY WITH MEALS
Qty: 60 TAB | Refills: 11 | Status: SHIPPED | OUTPATIENT
Start: 2020-01-08

## 2020-01-08 RX ORDER — METFORMIN HYDROCHLORIDE 500 MG/1
500 TABLET ORAL 2 TIMES DAILY WITH MEALS
Qty: 60 TAB | Refills: 11 | Status: SHIPPED
Start: 2020-01-08 | End: 2020-01-08 | Stop reason: SDUPTHER

## 2020-01-08 RX ORDER — METOPROLOL TARTRATE 25 MG/1
25 TABLET, FILM COATED ORAL EVERY 12 HOURS
Qty: 60 TAB | Refills: 11 | Status: SHIPPED | OUTPATIENT
Start: 2020-01-08

## 2020-01-08 RX ORDER — AZITHROMYCIN 250 MG/1
TABLET, FILM COATED ORAL
Qty: 4 TAB | Refills: 0 | Status: SHIPPED | OUTPATIENT
Start: 2020-01-08 | End: 2020-01-13

## 2020-01-08 RX ORDER — ASPIRIN 81 MG/1
81 TABLET ORAL DAILY
Qty: 30 TAB | Refills: 11 | Status: SHIPPED | OUTPATIENT
Start: 2020-01-08

## 2020-01-08 RX ADMIN — INSULIN LISPRO 7 UNITS: 100 INJECTION, SOLUTION INTRAVENOUS; SUBCUTANEOUS at 12:25

## 2020-01-08 RX ADMIN — ARFORMOTEROL TARTRATE 15 MCG: 15 SOLUTION RESPIRATORY (INHALATION) at 07:41

## 2020-01-08 RX ADMIN — AZITHROMYCIN MONOHYDRATE 500 MG: 250 TABLET ORAL at 07:29

## 2020-01-08 RX ADMIN — BUDESONIDE 500 MCG: 0.5 INHALANT RESPIRATORY (INHALATION) at 07:41

## 2020-01-08 RX ADMIN — PREDNISONE 40 MG: 20 TABLET ORAL at 07:30

## 2020-01-08 RX ADMIN — ATORVASTATIN CALCIUM 40 MG: 40 TABLET, FILM COATED ORAL at 07:29

## 2020-01-08 RX ADMIN — IPRATROPIUM BROMIDE AND ALBUTEROL SULFATE 3 ML: .5; 3 SOLUTION RESPIRATORY (INHALATION) at 02:21

## 2020-01-08 RX ADMIN — ASPIRIN 81 MG: 81 TABLET, COATED ORAL at 07:31

## 2020-01-08 RX ADMIN — METOPROLOL TARTRATE 25 MG: 25 TABLET ORAL at 07:30

## 2020-01-08 RX ADMIN — INSULIN LISPRO 3 UNITS: 100 INJECTION, SOLUTION INTRAVENOUS; SUBCUTANEOUS at 07:31

## 2020-01-08 RX ADMIN — GUAIFENESIN 400 MG: 200 SOLUTION ORAL at 07:31

## 2020-01-08 RX ADMIN — OXYCODONE AND ACETAMINOPHEN 1 TABLET: 10; 325 TABLET ORAL at 07:42

## 2020-01-08 RX ADMIN — TICAGRELOR 90 MG: 90 TABLET ORAL at 07:30

## 2020-01-08 NOTE — DISCHARGE SUMMARY
Hospitalist Discharge Summary     Patient ID:  Zeenat Izaguirre  [id number]  81 y.o.  1950  1/6/2020    PCP on record: None    Admit date: 1/6/2020  Discharge date and time: 1/8/2020    DISCHARGE DIAGNOSIS:  DISCHARGE SUMMARY/HOSPITAL COURSE:  for full details see H&P, daily progress notes, labs, consult notes  LLL Pneumonia: has leukocytosis but this may be related to steroids vs infection, CTX/Z-max, bronchodilators, mucolytics, iv steroids changed to PO, sputum pending. Blood culture NGTD, pending. COPD Exacerbation:  treatment as above. CAD/Increased Troponin: times two trop (0.15 higher one), c/w Plavix/ASA/metoprolol/statin. Cath today 1/7 -Cath revealed 90% ostial LAD lesion, hazy. Previous stent patent in proximal LAD. LVEF 60%. PCI of LAD. Switch  to brilinta or effient from plavix. Echo:  · Normal cavity size and diastolic function. Upper normal wall thickness. Low normal systolic dysfunction. Estimated left ventricular ejection fraction is 50 - 55%. · Aortic valve sclerosis with no significant stenosis.     DM type 2 with hyperglycemia:  SSI, 6.7 HbA1C, hold metformin for now. SOFÍA: cont to hold ACE and Metformin, give gentle hydration and monitor. resolved  HTN: stopped Diltiazem, hold ACE, use labetalol prn -see above  Code Status: Full Code  Surrogate Decision Maker: sister Emily Abbott [phone] or [phone]  DVT Prophylaxis: Lovenox       CONSULTATIONS:  IP CONSULT TO CARDIOLOGY    Excerpted HPI from H&P of Constantino Gardiner MD:  Ollie Cardoza is a 81 y.o.    male  with a history of CAD, hypertension, presenting for chest pain.  Patient states that he had an LAD stent placed October 1 by his cardiologist in [facility] [address] is currently visiting his sisters here in [address] that since his stent placement has been waking up in the middle of the night with chest pain, pounding chest pains, shortness of breath.  Did speak with his cardiologist who told him it possibly is due to vasospasms of the coronary arteries after stent placement.  Has been taking nitroglycerin with some relief.  Patient states that last night he was again woken up in the middle of his sleep because of chest pounding and pain as well as shortness of breath.  Took a nitroglycerin under his tongue which did not relieve the pain.  States currently not having any pain but frustrated by these pains.  He does have a history of asthma and did not take his nebulizer treatment today.  States it is associated with chills, but denies any fevers.  Asked about cough, patient does state that in the last 3 days has been having an increased cough.  No rhinorrhea or congestion. AT this time patient is lying in bed c/o SOB, cough with yellow sputum, malaise, chest pain, denies N/V no diarrhea, no urinary symptoms, no other associated symptoms. We were asked to admit for work up and evaluation of the above problems.        ______________________________________________________________________  _______________________________________________________________________  Patient seen and examined by me on discharge day. Pertinent Findings:  See other note from me today  _______________________________________________________________________  DISCHARGE MEDICATIONS:   Current Discharge Medication List      START taking these medications    Details   aspirin delayed-release 81 mg tablet Take 1 Tab by mouth daily. Qty: 30 Tab, Refills: 11      metoprolol tartrate (LOPRESSOR) 25 mg tablet Take 1 Tab by mouth every twelve (12) hours. Qty: 60 Tab, Refills: 11      ticagrelor (BRILINTA) 90 mg tablet Take 1 Tab by mouth every twelve (12) hours every twelve (12) hours. Qty: 60 Tab, Refills: 11      azithromycin (ZITHROMAX) 250 mg tablet 1 tab daily PO  Qty: 4 Tab, Refills: 0         CONTINUE these medications which have CHANGED    Details   metFORMIN (GLUCOPHAGE) 500 mg tablet Take 1 Tab by mouth two (2) times daily (with meals).  Please restart Metformin on 1/9/2020  Qty: 60 Tab, Refills: 11      predniSONE (DELTASONE) 10 mg tablet 40mg two more days, then 20mg for 3 days then 10mg  Qty: 20 Tab, Refills: 0         CONTINUE these medications which have NOT CHANGED    Details   vit C/E/Zn/coppr/lutein/zeaxan (PRESERVISION AREDS-2 PO) Take 1 Tab by mouth two (2) times a day. sub-q infusion pump (SUBCUTANEOUS INFUSION PUMP) by Does Not Apply route. Dilaudid Concentration: 20 mg/ml  Daily dose: 7.134 mg/day  Pump size: 20 cc  Last fill: 12/20/19  Next Fill: 1/31/20      temazepam (RESTORIL) 30 mg capsule Take 30 mg by mouth nightly as needed for Sleep. cyclobenzaprine (FLEXERIL) 10 mg tablet Take 10 mg by mouth three (3) times daily as needed for Muscle Spasm(s). enalapril (VASOTEC) 10 mg tablet Take 10 mg by mouth two (2) times a day. atorvastatin (LIPITOR) 40 mg tablet Take 40 mg by mouth daily. apremilast 30 mg tab Take 30 mg by mouth two (2) times a day. 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      oxyCODONE-acetaminophen (PERCOCET 10)  mg per tablet Take 1 Tab by mouth every four (4) hours as needed for Pain. Indications: PAIN      budesonide-formoterol (SYMBICORT) 160-4.5 mcg/actuation HFA inhaler Take 2 Puffs by inhalation daily.          STOP taking these medications       clopidogrel (PLAVIX) 75 mg tab Comments:   Reason for Stopping:         diltiazem XR (DILACOR XR) 180 mg XR capsule Comments:   Reason for Stopping:                 Patient Follow Up Instructions:   See dc instructions    Follow-up Information     Follow up With Specialties Details Why Contact Info    Patient's Own Medication is located in the Patient's:  Ruben Nova        None    None [phone]) Patient stated that they have no PCP          ________________________________________________________________      Condition at Discharge:  Stable  __________________________________________________________________    Dispo - home, back to [place]  ___________________________________________________________________    Code Status: Full Code  ___________________________________________________________________      Total time in minutes spent coordinating this discharge (includes going over instructions, follow-up, prescriptions, and preparing report for sign off to her PCP) :  33 minutes    Signed:  René Mcnair MD

## 2020-01-08 NOTE — PROGRESS NOTES
ADULT PROTOCOL: JET AEROSOL  REASSESSMENT    Patient  Paul Barfield     81 y.o.   male     1/8/2020  12:17 AM    Breath Sounds Pre Procedure: Right Breath Sounds: Clear, Diminished                               Left Breath Sounds: Clear, Diminished    Breath Sounds Post Procedure: Right Breath Sounds: Clear, Diminished                                 Left Breath Sounds: Clear, Diminished    Breathing pattern: Pre procedure Breathing Pattern: Regular          Post procedure Breathing Pattern: Regular    Heart Rate: Pre procedure Pulse: 76           Post procedure Pulse: 77    Resp Rate: Pre procedure Respirations: 20           Post procedure Respirations: 20    Peak Flow: Pre bronchodilator   n/a          Post bronchodilator   n/a    Incentive Spirometry:   n/a      n/a    Cough: Pre procedure Cough: Non-productive               Post procedure Cough: Non-productive      Sputum: Pre procedure  none                 Post procedure  none    Oxygen: O2 Device: Room air   FiO2 (%) room air   21%     Changed: NO    SpO2: Pre procedure SpO2: 97 %   without oxygen              Post procedure SpO2: 97 %  without oxygen    Nebulizer Therapy: Current medications Aerosolized Medications: Brovana, Pulmicort      Changed: NO    Problem List:   Patient Active Problem List   Diagnosis Code    Left lower lobe pneumonia ([facility]) J18.1    Asthma with acute exacerbation in adult J45. 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

## 2020-01-08 NOTE — PROGRESS NOTES
Pt given d/c instructions, site care, prescriptions, pain management, and folluw up.  Pt taken by WC to main entrance

## 2020-01-08 NOTE — PROGRESS NOTES
[facility] [address]  [phone]      Cardiology Progress Note      1/8/2020 1:06 PM    Admit Date: 1/6/2020    Admit Diagnosis:   Pneumonia [J18.9]    Subjective:     Jonathan Gandhi is s/p PCI/PINO to LAD. Has no c/o CP, SOB.   Ambulating without difficulty    Visit Vitals  /73   Pulse 71   Temp 98.2 °F (36.8 °C)   Resp 18   Ht 6' 1\" (1.854 m)   Wt 102.5 kg (225 lb 15.5 oz)   SpO2 94%   BMI 29.81 kg/m²       Current Facility-Administered Medications   Medication Dose Route Frequency    aspirin delayed-release tablet 81 mg  81 mg Oral DAILY    ticagrelor (BRILINTA) tablet 90 mg  90 mg Oral Q12H    predniSONE (DELTASONE) tablet 40 mg  40 mg Oral DAILY WITH BREAKFAST    apremilast tab 30 mg  (Patient Supplied)  30 mg Oral Q12H    atorvastatin (LIPITOR) tablet 40 mg  40 mg Oral DAILY    cyclobenzaprine (FLEXERIL) tablet 10 mg  10 mg Oral TID PRN    oxyCODONE-acetaminophen (PERCOCET 10)  mg per tablet 1 Tab  1 Tab Oral Q4H PRN    temazepam (RESTORIL) capsule 30 mg  30 mg Oral QHS PRN    cefTRIAXone (ROCEPHIN) 1 g in 0.9% sodium chloride (MBP/ADV) 50 mL  1 g IntraVENous Q24H    azithromycin (ZITHROMAX) tablet 500 mg  500 mg Oral DAILY    guaiFENesin (ROBITUSSIN) 100 mg/5 mL oral liquid 400 mg  400 mg Oral TID    acetaminophen (TYLENOL) tablet 650 mg  650 mg Oral Q4H PRN    ondansetron (ZOFRAN) injection 4 mg  4 mg IntraVENous Q6H PRN    insulin lispro (HUMALOG) injection   SubCUTAneous AC&HS    glucose chewable tablet 16 g  4 Tab Oral PRN    glucagon (GLUCAGEN) injection 1 mg  1 mg IntraMUSCular PRN    labetalol (NORMODYNE;TRANDATE) injection 10 mg  10 mg IntraVENous Q6H PRN    HYDROmorphone (PF) (DILAUDID) injection 0.5 mg  0.5 mg IntraVENous Q4H PRN    dextrose 10% infusion 0-250 mL  0-250 mL IntraVENous PRN    albuterol-ipratropium (DUO-NEB) 2.5 MG-0.5 MG/3 ML  3 mL Nebulization Q6H RT    budesonide (PULMICORT) 500 mcg/2 ml nebulizer suspension  500 mcg Nebulization BID RT    And    arformoterol (BROVANA) neb solution 15 mcg  15 mcg Nebulization BID RT    metoprolol tartrate (LOPRESSOR) tablet 25 mg  25 mg Oral Q12H       Objective:      Physical Exam:  General Appearance:  obese  male in no acute distress  Chest:   wheezing much improved  Cardiovascular:  Regular rate and rhythm, no murmur. Abdomen:   Soft, non-tender, bowel sounds are active. Extremities: no peripheral edema; right radial site D/I   Skin:  Warm and dry.      Data Review:   Recent Labs     01/08/20  0242 01/07/20  0035 01/06/20  1126   WBC 23.5* 17.8* 22.3*   HGB 12.1 11.3* 12.6   HCT 37.9 35.8* 39.7    221 243     Recent Labs     01/08/20  0242 01/07/20  0035 01/06/20  1126    134* 133*   K 4.4 4.1 4.7    101 100   CO2 25 25 28   * 239* 135*   BUN 28* 31* 27*   CREA 0.88 1.04 1.30   CA 8.9 8.7 8.8   MG  --  2.4  --    ALB  --  3.0* 3.4*   TBILI  --  0.6 1.2*   SGOT  --  11* 14*   ALT  --  21 22       Recent Labs     01/07/20  0035 01/06/20  1126   TROIQ 0.15* 0.14*         Intake/Output Summary (Last 24 hours) at 1/8/2020 0950  Last data filed at 1/8/2020 0253  Gross per 24 hour   Intake 1040 ml   Output 700 ml   Net 340 ml        Telemetry: SR with PACs    Assessment:     Active Problems:    Hypertension (12/22/2015)      Left lower lobe pneumonia ([facility] [address] (12/21/2015)      Asthma with acute exacerbation in adult (12/21/2015)      DM type 2 (diabetes mellitus, type 2) ([facility] [address] (12/21/2015)      Overview: Diet controlled      COPD (chronic obstructive pulmonary disease) ([facility] [address] (8/24/2019)      Pneumonia (1/6/2020)      Elevated troponin (1/6/2020)      CAD (coronary artery disease) (1/6/2020)      Overview: Oct 2019 PCI/PINO LAD in [facility]      S/P cardiac cath (1/7/2020)      Overview: 1/7/2020 PCI/PINO to ostial LM/LAD        Plan:     Elevated troponin:  S/p cardiac cath - patent stents from Oct 2019, received PCI/PINO to ostial LAD  Change Plavix to Brilinta 90mg BID, continue on ASA 81mg daily, metoprolol, statin   Patient will receive Brilinta coupon for 1 month of medications. If patient is unable to afford Brilinta after 1st month, recommend switching to Effient 10mg daily. Concerned that he is Plavix resistant. HTN:  D/c Dilt, started BB. Restart his ACEI at discharge    Patient lives in [place] and has an appointment with his Cardiologist on 1/13/2020      . [facility] [address] [facility] Card[facility]    1/8/2020         Patient seen, examined by me personally. Plan discussed as detailed. Agree with note as outlined by  NP. I confirm findings in history and physical exam. No additional findings noted. Agree with plan as outlined above.      Mer Barajas MD

## 2020-01-08 NOTE — DISCHARGE INSTRUCTIONS
Smoking Cessation Program:   This is a free, phone/text/email based, smoking cessation program. The program is individualized to meet each patient's needs. To enroll use this link [email or web address]                      HOSPITALIST DISCHARGE INSTRUCTIONS    NAME: Josse Trivedi   :  1950   MRN:  [id number]     Date/Time:  2020 11:57 AM    ADMIT DATE: 2020     DISCHARGE DATE: 2020     DISCHARGE DIAGNOSIS:  See DC summary     MEDICATIONS:  As per medication reconciliation  list  · It is important that you take the medication exactly as they are prescribed. · Keep your medication in the bottles provided by the pharmacist and keep a list of the medication names, dosages, and times to be taken in your wallet. · Do not take other medications without consulting your doctor. Pain Management: per above medications    What to do at Home    Recommended diet:  Cardiac Diet and Diabetic Diet    Recommended activity: Activity as tolerated    If you have questions regarding the hospital related prescriptions or hospital related issues please call [facility] [place] at [phone]. If you experience any of the following symptoms then please call your primary care physician or return to the emergency room if you cannot get hold of your doctor:  Fever, chills, nausea, vomiting, diarrhea, change in mentation, falling, bleeding, shortness of breath, or chest pain. Follow Up:  Please FU with your PCP and cardiologist in [place]. Information obtained by :  I understand that if any problems occur once I am at home I am to contact my physician. I understand and acknowledge receipt of the instructions indicated above.                                                                                                                                            Physician's or R.N.'s Signature                                                                  Date/Time Patient or Representative Signature                                                          Date/Time

## 2020-01-08 NOTE — PROGRESS NOTES
RN (author) was called by Yahir Mahna lab [facility] regarding result of blood culture. According to [facility], one of four bottles grew gram positive cocci in clusters. Will page hospitalist on call tonight. Patient is resting in bed ambulatory and vital signs stable no fevers no complaints. Update  2241  Paged  for in house hospitalist,  Liza Gist told RN to call nursing supervisor for in house doctor. [place] supervisor Luca Hoover told RN to contact telehospitalist.    Telehospitalist flagged patient as \"Demographic Conflict Alert\" and there's intruction to call [phone] number, this number is busy. Called [phone] number twice but busy. 2248  Paged hospitalist in house through [facility]. RN requested for Dr. Vidhya Chinchilla. Dr. Vidhya Chinchilla called back immediately. Informed Dr. Vidhya Chinchilla of the blood culture result. Orders received    0304  Paired repeat blood culture performed.  Patient tolerated well    0740  Patient ambulating in the hallway without complaints

## 2020-01-08 NOTE — PROGRESS NOTES
Patient seen and examined. No complaints. Not clear why blood cx were done. VS reviewed and labs. Chest - crackles and wheezing mostly at bases.   cv - reg s1s2, no le edema    A/p: pending DC - awaiting cardiology input

## 2020-01-08 NOTE — PROGRESS NOTES
Problem: Breathing Pattern - Ineffective  Goal: *Absence of hypoxia  Outcome: Progressing Towards Goal  Goal: *Use of effective breathing techniques  Outcome: Progressing Towards Goal

## 2020-01-08 NOTE — PROGRESS NOTES
Transition of Care Plan:    * Disposition- Patient is planning to return to [place] post discharge and will F/U with PCP in [place]  * IM provided  * Patient declined [address] [facility], [address] [facility]

## 2020-01-09 LAB
BACTERIA SPEC CULT: ABNORMAL
GRAM STN SPEC: ABNORMAL
SERVICE CMNT-IMP: ABNORMAL

## 2020-01-12 LAB
BACTERIA SPEC CULT: ABNORMAL
SERVICE CMNT-IMP: ABNORMAL

## 2020-01-13 LAB
BACTERIA SPEC CULT: NORMAL
SERVICE CMNT-IMP: NORMAL

## 2020-12-03 ENCOUNTER — APPOINTMENT (RX ONLY)
Dept: URBAN - METROPOLITAN AREA CLINIC 53 | Facility: CLINIC | Age: 70
Setting detail: DERMATOLOGY
End: 2020-12-03

## 2020-12-03 VITALS — TEMPERATURE: 97 F

## 2020-12-03 DIAGNOSIS — L40.0 PSORIASIS VULGARIS: ICD-10-CM | Status: WELL CONTROLLED

## 2020-12-03 DIAGNOSIS — D69.2 OTHER NONTHROMBOCYTOPENIC PURPURA: ICD-10-CM

## 2020-12-03 PROCEDURE — ? COUNSELING

## 2020-12-03 PROCEDURE — ? FULL BODY SKIN EXAM - DECLINED

## 2020-12-03 PROCEDURE — 99214 OFFICE O/P EST MOD 30 MIN: CPT

## 2020-12-03 PROCEDURE — ? ADDITIONAL NOTES

## 2020-12-03 PROCEDURE — ? PRESCRIPTION

## 2020-12-03 RX ORDER — APREMILAST 30 MG/1
1 TABLET, FILM COATED ORAL BID
Qty: 60 | Refills: 11 | Status: ERX | COMMUNITY
Start: 2020-12-03

## 2020-12-03 RX ADMIN — APREMILAST 1: 30 TABLET, FILM COATED ORAL at 00:00

## 2020-12-03 ASSESSMENT — LOCATION DETAILED DESCRIPTION DERM
LOCATION DETAILED: XIPHOID
LOCATION DETAILED: LEFT DISTAL DORSAL FOREARM
LOCATION DETAILED: RIGHT PROXIMAL DORSAL FOREARM

## 2020-12-03 ASSESSMENT — LOCATION SIMPLE DESCRIPTION DERM
LOCATION SIMPLE: RIGHT FOREARM
LOCATION SIMPLE: LEFT FOREARM
LOCATION SIMPLE: ABDOMEN

## 2020-12-03 ASSESSMENT — LOCATION ZONE DERM
LOCATION ZONE: TRUNK
LOCATION ZONE: ARM

## 2020-12-03 ASSESSMENT — BSA PSORIASIS: % BODY COVERED IN PSORIASIS: 10

## 2021-01-21 ENCOUNTER — APPOINTMENT (RX ONLY)
Dept: URBAN - METROPOLITAN AREA CLINIC 53 | Facility: CLINIC | Age: 71
Setting detail: DERMATOLOGY
End: 2021-01-21

## 2021-01-21 VITALS — TEMPERATURE: 97.7 F

## 2021-01-21 DIAGNOSIS — L40.0 PSORIASIS VULGARIS: ICD-10-CM | Status: WORSENING

## 2021-01-21 PROCEDURE — ? PRESCRIPTION

## 2021-01-21 PROCEDURE — 99214 OFFICE O/P EST MOD 30 MIN: CPT

## 2021-01-21 PROCEDURE — ? FULL BODY SKIN EXAM - DECLINED

## 2021-01-21 PROCEDURE — ? ADDITIONAL NOTES

## 2021-01-21 PROCEDURE — ? COUNSELING

## 2021-01-21 RX ORDER — CLOBETASOL PROPIONATE 0.5 MG/G
OINTMENT TOPICAL BID
Qty: 1 | Refills: 6 | Status: ERX

## 2021-01-21 ASSESSMENT — LOCATION SIMPLE DESCRIPTION DERM
LOCATION SIMPLE: RIGHT PRETIBIAL REGION
LOCATION SIMPLE: LEFT PRETIBIAL REGION

## 2021-01-21 ASSESSMENT — LOCATION DETAILED DESCRIPTION DERM
LOCATION DETAILED: LEFT PROXIMAL PRETIBIAL REGION
LOCATION DETAILED: RIGHT PROXIMAL PRETIBIAL REGION

## 2021-01-21 ASSESSMENT — BSA PSORIASIS: % BODY COVERED IN PSORIASIS: 11

## 2021-01-21 ASSESSMENT — LOCATION ZONE DERM: LOCATION ZONE: LEG

## 2021-06-21 ENCOUNTER — APPOINTMENT (RX ONLY)
Dept: URBAN - METROPOLITAN AREA CLINIC 53 | Facility: CLINIC | Age: 71
Setting detail: DERMATOLOGY
End: 2021-06-21

## 2021-06-21 DIAGNOSIS — B07.8 OTHER VIRAL WARTS: ICD-10-CM

## 2021-06-21 DIAGNOSIS — L40.0 PSORIASIS VULGARIS: ICD-10-CM | Status: WELL CONTROLLED

## 2021-06-21 PROCEDURE — ? BENIGN DESTRUCTION

## 2021-06-21 PROCEDURE — 99213 OFFICE O/P EST LOW 20 MIN: CPT | Mod: 25

## 2021-06-21 PROCEDURE — ? FULL BODY SKIN EXAM - DECLINED

## 2021-06-21 PROCEDURE — 17110 DESTRUCTION B9 LES UP TO 14: CPT

## 2021-06-21 PROCEDURE — ? COUNSELING

## 2021-06-21 ASSESSMENT — LOCATION DETAILED DESCRIPTION DERM
LOCATION DETAILED: RIGHT CLAVICULAR NECK
LOCATION DETAILED: LEFT INFERIOR LATERAL NECK
LOCATION DETAILED: LEFT SUPERIOR LATERAL NECK
LOCATION DETAILED: LEFT SUPERIOR ANTERIOR NECK

## 2021-06-21 ASSESSMENT — LOCATION SIMPLE DESCRIPTION DERM
LOCATION SIMPLE: RIGHT ANTERIOR NECK
LOCATION SIMPLE: LEFT ANTERIOR NECK

## 2021-06-21 ASSESSMENT — LOCATION ZONE DERM: LOCATION ZONE: NECK

## 2021-06-21 ASSESSMENT — BSA PSORIASIS: % BODY COVERED IN PSORIASIS: 0

## 2021-06-21 NOTE — PROCEDURE: BENIGN DESTRUCTION
Consent: The patient's consent was obtained including but not limited to risks of crusting, scabbing, blistering, scarring, darker or lighter pigmentary change, recurrence, incomplete removal and infection.
Include Z78.9 (Other Specified Conditions Influencing Health Status) As An Associated Diagnosis?: No
Post-Care Instructions: I reviewed with the patient in detail post-care instructions. Patient is to wear sunprotection, and avoid picking at any of the treated lesions. Pt may apply Vaseline to crusted or scabbing areas.
Detail Level: Detailed
Anesthesia Volume In Cc: 0
Medical Necessity Information: It is in your best interest to select a reason for this procedure from the list below. All of these items fulfill various CMS LCD requirements except the new and changing color options.
Medical Necessity Clause: This procedure was medically necessary because the lesions that were treated were:

## 2021-12-07 ENCOUNTER — APPOINTMENT (RX ONLY)
Dept: URBAN - METROPOLITAN AREA CLINIC 53 | Facility: CLINIC | Age: 71
Setting detail: DERMATOLOGY
End: 2021-12-07

## 2021-12-07 DIAGNOSIS — L40.0 PSORIASIS VULGARIS: ICD-10-CM | Status: WELL CONTROLLED

## 2021-12-07 PROCEDURE — ? COUNSELING

## 2021-12-07 PROCEDURE — ? PRESCRIPTION

## 2021-12-07 PROCEDURE — ? FULL BODY SKIN EXAM - DECLINED

## 2021-12-07 PROCEDURE — 99213 OFFICE O/P EST LOW 20 MIN: CPT

## 2021-12-07 RX ORDER — APREMILAST 30 MG/1
TABLET, FILM COATED ORAL
Qty: 180 | Refills: 4 | Status: ERX | COMMUNITY
Start: 2021-12-07

## 2021-12-07 RX ADMIN — APREMILAST: 30 TABLET, FILM COATED ORAL at 00:00

## 2021-12-07 ASSESSMENT — LOCATION ZONE DERM: LOCATION ZONE: LEG

## 2021-12-07 ASSESSMENT — LOCATION DETAILED DESCRIPTION DERM: LOCATION DETAILED: RIGHT PROXIMAL PRETIBIAL REGION

## 2021-12-07 ASSESSMENT — BSA PSORIASIS: % BODY COVERED IN PSORIASIS: 1

## 2021-12-07 ASSESSMENT — LOCATION SIMPLE DESCRIPTION DERM: LOCATION SIMPLE: RIGHT PRETIBIAL REGION

## 2022-02-03 ENCOUNTER — APPOINTMENT (RX ONLY)
Dept: URBAN - METROPOLITAN AREA CLINIC 53 | Facility: CLINIC | Age: 72
Setting detail: DERMATOLOGY
End: 2022-02-03

## 2022-02-03 DIAGNOSIS — L40.0 PSORIASIS VULGARIS: ICD-10-CM

## 2022-02-03 PROCEDURE — ? PRESCRIPTION

## 2022-02-03 RX ORDER — APREMILAST 30 MG/1
TABLET, FILM COATED ORAL
Qty: 180 | Refills: 4 | Status: ERX

## 2022-03-18 PROBLEM — J44.9 COPD (CHRONIC OBSTRUCTIVE PULMONARY DISEASE) (HCC): Status: ACTIVE | Noted: 2019-08-24

## 2022-03-19 PROBLEM — I25.10 CAD (CORONARY ARTERY DISEASE): Status: ACTIVE | Noted: 2020-01-06

## 2022-03-19 PROBLEM — J18.9 PNEUMONIA: Status: ACTIVE | Noted: 2020-01-06

## 2022-03-19 PROBLEM — R77.8 ELEVATED TROPONIN: Status: ACTIVE | Noted: 2020-01-06

## 2022-03-19 PROBLEM — Z98.890 S/P CARDIAC CATH: Status: ACTIVE | Noted: 2020-01-07

## 2022-03-19 PROBLEM — G89.29 CHRONIC PAIN: Status: ACTIVE | Noted: 2019-08-24

## 2022-12-23 ENCOUNTER — HOSPITAL ENCOUNTER (INPATIENT)
Age: 72
LOS: 2 days | Discharge: HOME | DRG: 871 | End: 2022-12-25
Attending: EMERGENCY MEDICINE | Admitting: GENERAL ACUTE CARE HOSPITAL
Payer: MEDICARE

## 2022-12-23 ENCOUNTER — APPOINTMENT (OUTPATIENT)
Dept: CT IMAGING | Age: 72
DRG: 871 | End: 2022-12-23
Attending: EMERGENCY MEDICINE
Payer: MEDICARE

## 2022-12-23 ENCOUNTER — APPOINTMENT (OUTPATIENT)
Dept: GENERAL RADIOLOGY | Age: 72
DRG: 871 | End: 2022-12-23
Attending: EMERGENCY MEDICINE
Payer: MEDICARE

## 2022-12-23 ENCOUNTER — APPOINTMENT (OUTPATIENT)
Dept: CT IMAGING | Age: 72
DRG: 871 | End: 2022-12-23
Attending: GENERAL ACUTE CARE HOSPITAL
Payer: MEDICARE

## 2022-12-23 DIAGNOSIS — J18.9 COMMUNITY ACQUIRED PNEUMONIA, UNSPECIFIED LATERALITY: ICD-10-CM

## 2022-12-23 DIAGNOSIS — R65.20 SEVERE SEPSIS (HCC): Primary | ICD-10-CM

## 2022-12-23 DIAGNOSIS — G93.40 ACUTE ENCEPHALOPATHY: ICD-10-CM

## 2022-12-23 DIAGNOSIS — A41.9 SEVERE SEPSIS (HCC): Primary | ICD-10-CM

## 2022-12-23 LAB
ALBUMIN SERPL-MCNC: 3 G/DL (ref 3.5–5)
ALBUMIN/GLOB SERPL: 1.1 {RATIO} (ref 1.1–2.2)
ALP SERPL-CCNC: 49 U/L (ref 45–117)
ALT SERPL-CCNC: 17 U/L (ref 12–78)
AMPHET UR QL SCN: NEGATIVE
ANION GAP SERPL CALC-SCNC: 8 MMOL/L (ref 5–15)
APPEARANCE UR: CLEAR
AST SERPL-CCNC: 6 U/L (ref 15–37)
BACTERIA URNS QL MICRO: NEGATIVE /HPF
BARBITURATES UR QL SCN: NEGATIVE
BASE EXCESS BLD CALC-SCNC: 0.7 MMOL/L
BASOPHILS # BLD: 0 K/UL (ref 0–0.1)
BASOPHILS NFR BLD: 0 % (ref 0–1)
BENZODIAZ UR QL: POSITIVE
BILIRUB SERPL-MCNC: 0.5 MG/DL (ref 0.2–1)
BILIRUB UR QL: NEGATIVE
BUN SERPL-MCNC: 28 MG/DL (ref 6–20)
BUN/CREAT SERPL: 18 (ref 12–20)
CA-I BLD-MCNC: 1.21 MMOL/L (ref 1.12–1.32)
CALCIUM SERPL-MCNC: 8.6 MG/DL (ref 8.5–10.1)
CANNABINOIDS UR QL SCN: NEGATIVE
CHLORIDE BLD-SCNC: 99 MMOL/L (ref 100–108)
CHLORIDE SERPL-SCNC: 102 MMOL/L (ref 97–108)
CO2 BLD-SCNC: 28 MMOL/L (ref 19–24)
CO2 SERPL-SCNC: 27 MMOL/L (ref 21–32)
COCAINE UR QL SCN: NEGATIVE
COLOR UR: NORMAL
COMMENT, HOLDF: NORMAL
COVID-19 RAPID TEST, COVR: NOT DETECTED
CREAT SERPL-MCNC: 1.54 MG/DL (ref 0.7–1.3)
CREAT UR-MCNC: 1.2 MG/DL (ref 0.6–1.3)
DIFFERENTIAL METHOD BLD: ABNORMAL
DRUG SCRN COMMENT,DRGCM: ABNORMAL
EOSINOPHIL # BLD: 0 K/UL (ref 0–0.4)
EOSINOPHIL NFR BLD: 0 % (ref 0–7)
EPITH CASTS URNS QL MICRO: NORMAL /LPF
ERYTHROCYTE [DISTWIDTH] IN BLOOD BY AUTOMATED COUNT: 15.9 % (ref 11.5–14.5)
FLUAV AG NPH QL IA: NEGATIVE
FLUBV AG NOSE QL IA: NEGATIVE
GLOBULIN SER CALC-MCNC: 2.8 G/DL (ref 2–4)
GLUCOSE BLD STRIP.AUTO-MCNC: 228 MG/DL (ref 74–106)
GLUCOSE SERPL-MCNC: 233 MG/DL (ref 65–100)
GLUCOSE UR STRIP.AUTO-MCNC: NEGATIVE MG/DL
HCO3 BLDA-SCNC: 28 MMOL/L
HCT VFR BLD AUTO: 35.3 % (ref 36.6–50.3)
HGB BLD-MCNC: 11.4 G/DL (ref 12.1–17)
HGB UR QL STRIP: NEGATIVE
HYALINE CASTS URNS QL MICRO: NORMAL /LPF (ref 0–5)
IMM GRANULOCYTES # BLD AUTO: 0 K/UL (ref 0–0.04)
IMM GRANULOCYTES NFR BLD AUTO: 0 % (ref 0–0.5)
KETONES UR QL STRIP.AUTO: NEGATIVE MG/DL
LACTATE BLD-SCNC: 2.24 MMOL/L (ref 0.4–2)
LACTATE BLD-SCNC: 2.46 MMOL/L (ref 0.4–2)
LACTATE BLD-SCNC: 2.89 MMOL/L (ref 0.4–2)
LEUKOCYTE ESTERASE UR QL STRIP.AUTO: NEGATIVE
LIPASE SERPL-CCNC: 55 U/L (ref 73–393)
LYMPHOCYTES # BLD: 0.5 K/UL (ref 0.8–3.5)
LYMPHOCYTES NFR BLD: 2 % (ref 12–49)
MCH RBC QN AUTO: 27.1 PG (ref 26–34)
MCHC RBC AUTO-ENTMCNC: 32.3 G/DL (ref 30–36.5)
MCV RBC AUTO: 83.8 FL (ref 80–99)
METHADONE UR QL: NEGATIVE
MONOCYTES # BLD: 1.6 K/UL (ref 0–1)
MONOCYTES NFR BLD: 7 % (ref 5–13)
NEUTS BAND NFR BLD MANUAL: 16 %
NEUTS SEG # BLD: 20.7 K/UL (ref 1.8–8)
NEUTS SEG NFR BLD: 75 % (ref 32–75)
NITRITE UR QL STRIP.AUTO: NEGATIVE
NRBC # BLD: 0 K/UL (ref 0–0.01)
NRBC BLD-RTO: 0 PER 100 WBC
OPIATES UR QL: POSITIVE
PCO2 BLDV: 55.7 MMHG (ref 41–51)
PCP UR QL: NEGATIVE
PH BLDV: 7.31 [PH] (ref 7.32–7.42)
PH UR STRIP: 6 [PH] (ref 5–8)
PLATELET # BLD AUTO: 229 K/UL (ref 150–400)
PMV BLD AUTO: 8.9 FL (ref 8.9–12.9)
PO2 BLDV: 18 MMHG (ref 25–40)
POTASSIUM BLD-SCNC: 5.1 MMOL/L (ref 3.5–5.5)
POTASSIUM SERPL-SCNC: 5.2 MMOL/L (ref 3.5–5.1)
PROCALCITONIN SERPL-MCNC: 16.25 NG/ML
PROT SERPL-MCNC: 5.8 G/DL (ref 6.4–8.2)
PROT UR STRIP-MCNC: NEGATIVE MG/DL
RBC # BLD AUTO: 4.21 M/UL (ref 4.1–5.7)
RBC #/AREA URNS HPF: NORMAL /HPF (ref 0–5)
RBC MORPH BLD: ABNORMAL
SAMPLES BEING HELD,HOLD: NORMAL
SODIUM BLD-SCNC: 136 MMOL/L (ref 136–145)
SODIUM SERPL-SCNC: 137 MMOL/L (ref 136–145)
SOURCE, COVRS: NORMAL
SP GR UR REFRACTOMETRY: 1.02
SPECIMEN SITE: ABNORMAL
TROPONIN-HIGH SENSITIVITY: 19 NG/L (ref 0–76)
UA: UC IF INDICATED,UAUC: NORMAL
UROBILINOGEN UR QL STRIP.AUTO: 0.2 EU/DL (ref 0.2–1)
WBC # BLD AUTO: 22.8 K/UL (ref 4.1–11.1)
WBC MORPH BLD: ABNORMAL
WBC URNS QL MICRO: NORMAL /HPF (ref 0–4)

## 2022-12-23 PROCEDURE — 74011250636 HC RX REV CODE- 250/636: Performed by: GENERAL ACUTE CARE HOSPITAL

## 2022-12-23 PROCEDURE — 96374 THER/PROPH/DIAG INJ IV PUSH: CPT

## 2022-12-23 PROCEDURE — 84145 PROCALCITONIN (PCT): CPT

## 2022-12-23 PROCEDURE — 74011000258 HC RX REV CODE- 258: Performed by: GENERAL ACUTE CARE HOSPITAL

## 2022-12-23 PROCEDURE — 74011000258 HC RX REV CODE- 258: Performed by: EMERGENCY MEDICINE

## 2022-12-23 PROCEDURE — 87040 BLOOD CULTURE FOR BACTERIA: CPT

## 2022-12-23 PROCEDURE — 93005 ELECTROCARDIOGRAM TRACING: CPT

## 2022-12-23 PROCEDURE — 86738 MYCOPLASMA ANTIBODY: CPT

## 2022-12-23 PROCEDURE — 74011250636 HC RX REV CODE- 250/636: Performed by: EMERGENCY MEDICINE

## 2022-12-23 PROCEDURE — 99285 EMERGENCY DEPT VISIT HI MDM: CPT

## 2022-12-23 PROCEDURE — 74011250637 HC RX REV CODE- 250/637: Performed by: EMERGENCY MEDICINE

## 2022-12-23 PROCEDURE — 74011000250 HC RX REV CODE- 250: Performed by: GENERAL ACUTE CARE HOSPITAL

## 2022-12-23 PROCEDURE — 80307 DRUG TEST PRSMV CHEM ANLYZR: CPT

## 2022-12-23 PROCEDURE — 71045 X-RAY EXAM CHEST 1 VIEW: CPT

## 2022-12-23 PROCEDURE — 94640 AIRWAY INHALATION TREATMENT: CPT

## 2022-12-23 PROCEDURE — 83690 ASSAY OF LIPASE: CPT

## 2022-12-23 PROCEDURE — 71250 CT THORAX DX C-: CPT

## 2022-12-23 PROCEDURE — 80053 COMPREHEN METABOLIC PANEL: CPT

## 2022-12-23 PROCEDURE — 87449 NOS EACH ORGANISM AG IA: CPT

## 2022-12-23 PROCEDURE — 81001 URINALYSIS AUTO W/SCOPE: CPT

## 2022-12-23 PROCEDURE — 83605 ASSAY OF LACTIC ACID: CPT

## 2022-12-23 PROCEDURE — 87899 AGENT NOS ASSAY W/OPTIC: CPT

## 2022-12-23 PROCEDURE — 74176 CT ABD & PELVIS W/O CONTRAST: CPT

## 2022-12-23 PROCEDURE — 36415 COLL VENOUS BLD VENIPUNCTURE: CPT

## 2022-12-23 PROCEDURE — 85025 COMPLETE CBC W/AUTO DIFF WBC: CPT

## 2022-12-23 PROCEDURE — 84484 ASSAY OF TROPONIN QUANT: CPT

## 2022-12-23 PROCEDURE — 96365 THER/PROPH/DIAG IV INF INIT: CPT

## 2022-12-23 PROCEDURE — 65270000046 HC RM TELEMETRY

## 2022-12-23 PROCEDURE — 82947 ASSAY GLUCOSE BLOOD QUANT: CPT

## 2022-12-23 PROCEDURE — 87804 INFLUENZA ASSAY W/OPTIC: CPT

## 2022-12-23 PROCEDURE — 87070 CULTURE OTHR SPECIMN AEROBIC: CPT

## 2022-12-23 PROCEDURE — 87635 SARS-COV-2 COVID-19 AMP PRB: CPT

## 2022-12-23 RX ORDER — SODIUM CHLORIDE 0.9 % (FLUSH) 0.9 %
5-40 SYRINGE (ML) INJECTION EVERY 8 HOURS
Status: DISCONTINUED | OUTPATIENT
Start: 2022-12-23 | End: 2022-12-25 | Stop reason: HOSPADM

## 2022-12-23 RX ORDER — METOPROLOL TARTRATE 25 MG/1
25 TABLET, FILM COATED ORAL EVERY 12 HOURS
Status: DISCONTINUED | OUTPATIENT
Start: 2022-12-23 | End: 2022-12-25 | Stop reason: HOSPADM

## 2022-12-23 RX ORDER — ENOXAPARIN SODIUM 100 MG/ML
40 INJECTION SUBCUTANEOUS DAILY
Status: DISCONTINUED | OUTPATIENT
Start: 2022-12-24 | End: 2022-12-25 | Stop reason: HOSPADM

## 2022-12-23 RX ORDER — ACETAMINOPHEN 650 MG/1
650 SUPPOSITORY RECTAL
Status: DISCONTINUED | OUTPATIENT
Start: 2022-12-23 | End: 2022-12-25 | Stop reason: HOSPADM

## 2022-12-23 RX ORDER — CYCLOBENZAPRINE HCL 10 MG
10 TABLET ORAL
Status: DISCONTINUED | OUTPATIENT
Start: 2022-12-23 | End: 2022-12-25 | Stop reason: HOSPADM

## 2022-12-23 RX ORDER — POLYETHYLENE GLYCOL 3350 17 G/17G
17 POWDER, FOR SOLUTION ORAL DAILY PRN
Status: DISCONTINUED | OUTPATIENT
Start: 2022-12-23 | End: 2022-12-25 | Stop reason: HOSPADM

## 2022-12-23 RX ORDER — ACETAMINOPHEN 325 MG/1
650 TABLET ORAL ONCE
Status: COMPLETED | OUTPATIENT
Start: 2022-12-23 | End: 2022-12-23

## 2022-12-23 RX ORDER — TAMSULOSIN HYDROCHLORIDE 0.4 MG/1
0.4 CAPSULE ORAL DAILY
COMMUNITY

## 2022-12-23 RX ORDER — ONDANSETRON 4 MG/1
4 TABLET, ORALLY DISINTEGRATING ORAL
Status: DISCONTINUED | OUTPATIENT
Start: 2022-12-23 | End: 2022-12-25 | Stop reason: HOSPADM

## 2022-12-23 RX ORDER — SODIUM CHLORIDE 0.9 % (FLUSH) 0.9 %
5-10 SYRINGE (ML) INJECTION AS NEEDED
Status: DISCONTINUED | OUTPATIENT
Start: 2022-12-23 | End: 2022-12-23 | Stop reason: SDUPTHER

## 2022-12-23 RX ORDER — BALSAM PERU/CASTOR OIL
OINTMENT (GRAM) TOPICAL 2 TIMES DAILY
Status: DISCONTINUED | OUTPATIENT
Start: 2022-12-24 | End: 2022-12-25 | Stop reason: HOSPADM

## 2022-12-23 RX ORDER — SODIUM CHLORIDE 0.9 % (FLUSH) 0.9 %
5-40 SYRINGE (ML) INJECTION AS NEEDED
Status: DISCONTINUED | OUTPATIENT
Start: 2022-12-23 | End: 2022-12-25 | Stop reason: HOSPADM

## 2022-12-23 RX ORDER — ASPIRIN 81 MG/1
81 TABLET ORAL DAILY
Status: DISCONTINUED | OUTPATIENT
Start: 2022-12-24 | End: 2022-12-25 | Stop reason: HOSPADM

## 2022-12-23 RX ORDER — ACETAMINOPHEN 325 MG/1
650 TABLET ORAL
Status: DISCONTINUED | OUTPATIENT
Start: 2022-12-23 | End: 2022-12-25 | Stop reason: HOSPADM

## 2022-12-23 RX ORDER — SODIUM CHLORIDE 9 MG/ML
125 INJECTION, SOLUTION INTRAVENOUS CONTINUOUS
Status: DISCONTINUED | OUTPATIENT
Start: 2022-12-23 | End: 2022-12-23

## 2022-12-23 RX ORDER — TEMAZEPAM 30 MG/1
30 CAPSULE ORAL
Status: DISCONTINUED | OUTPATIENT
Start: 2022-12-23 | End: 2022-12-25 | Stop reason: HOSPADM

## 2022-12-23 RX ORDER — ATORVASTATIN CALCIUM 40 MG/1
40 TABLET, FILM COATED ORAL DAILY
Status: DISCONTINUED | OUTPATIENT
Start: 2022-12-24 | End: 2022-12-25 | Stop reason: HOSPADM

## 2022-12-23 RX ORDER — SODIUM CHLORIDE 9 MG/ML
125 INJECTION, SOLUTION INTRAVENOUS CONTINUOUS
Status: DISPENSED | OUTPATIENT
Start: 2022-12-23 | End: 2022-12-24

## 2022-12-23 RX ORDER — CLOPIDOGREL BISULFATE 75 MG/1
75 TABLET ORAL DAILY
COMMUNITY

## 2022-12-23 RX ORDER — ONDANSETRON 2 MG/ML
4 INJECTION INTRAMUSCULAR; INTRAVENOUS
Status: DISCONTINUED | OUTPATIENT
Start: 2022-12-23 | End: 2022-12-25 | Stop reason: HOSPADM

## 2022-12-23 RX ORDER — IPRATROPIUM BROMIDE AND ALBUTEROL SULFATE 2.5; .5 MG/3ML; MG/3ML
3 SOLUTION RESPIRATORY (INHALATION)
Status: DISCONTINUED | OUTPATIENT
Start: 2022-12-23 | End: 2022-12-25 | Stop reason: HOSPADM

## 2022-12-23 RX ORDER — ACETAMINOPHEN 325 MG/1
650 TABLET ORAL
Status: DISCONTINUED | OUTPATIENT
Start: 2022-12-23 | End: 2022-12-23 | Stop reason: SDUPTHER

## 2022-12-23 RX ORDER — ONDANSETRON 2 MG/ML
4 INJECTION INTRAMUSCULAR; INTRAVENOUS
Status: DISCONTINUED | OUTPATIENT
Start: 2022-12-23 | End: 2022-12-23 | Stop reason: SDUPTHER

## 2022-12-23 RX ADMIN — SODIUM CHLORIDE 125 ML/HR: 9 INJECTION, SOLUTION INTRAVENOUS at 17:13

## 2022-12-23 RX ADMIN — SODIUM CHLORIDE, PRESERVATIVE FREE 10 ML: 5 INJECTION INTRAVENOUS at 22:13

## 2022-12-23 RX ADMIN — ARFORMOTEROL TARTRATE: 15 SOLUTION RESPIRATORY (INHALATION) at 22:35

## 2022-12-23 RX ADMIN — AZITHROMYCIN MONOHYDRATE 500 MG: 500 INJECTION, POWDER, LYOPHILIZED, FOR SOLUTION INTRAVENOUS at 13:01

## 2022-12-23 RX ADMIN — SODIUM CHLORIDE, POTASSIUM CHLORIDE, SODIUM LACTATE AND CALCIUM CHLORIDE 2910 ML: 600; 310; 30; 20 INJECTION, SOLUTION INTRAVENOUS at 11:47

## 2022-12-23 RX ADMIN — SODIUM CHLORIDE, POTASSIUM CHLORIDE, SODIUM LACTATE AND CALCIUM CHLORIDE 1000 ML: 600; 310; 30; 20 INJECTION, SOLUTION INTRAVENOUS at 12:08

## 2022-12-23 RX ADMIN — SODIUM CHLORIDE 1 G: 900 INJECTION INTRAVENOUS at 11:46

## 2022-12-23 RX ADMIN — ACETAMINOPHEN 650 MG: 325 TABLET ORAL at 11:46

## 2022-12-23 RX ADMIN — SODIUM CHLORIDE, POTASSIUM CHLORIDE, SODIUM LACTATE AND CALCIUM CHLORIDE 1000 ML: 600; 310; 30; 20 INJECTION, SOLUTION INTRAVENOUS at 11:46

## 2022-12-23 RX ADMIN — DOXYCYCLINE 100 MG: 100 INJECTION, POWDER, LYOPHILIZED, FOR SOLUTION INTRAVENOUS at 22:06

## 2022-12-23 RX ADMIN — SODIUM CHLORIDE, POTASSIUM CHLORIDE, SODIUM LACTATE AND CALCIUM CHLORIDE 910 ML: 600; 310; 30; 20 INJECTION, SOLUTION INTRAVENOUS at 13:04

## 2022-12-23 NOTE — H&P
Hospitalist Admission Note    NAME: New Sotelo   :  1950   MRN:  [id number]     Date/Time:  2022 1:17 AM    Patient PCP: None  ______________________________________________________________________  Given the patient's current clinical presentation, I have a high level of concern for decompensation if discharged from the emergency department. Complex decision making was performed, which includes reviewing the patient's available past medical records, laboratory results, and x-ray films. My assessment of this patient's clinical condition and my plan of care is as follows. Assessment / Plan:    B/l Pna  Parapneumonic effusion  Sepsis d/t the above  IV Abx  F/up MRSA, sputum  Cx  F/up B Cx  SLP  Urine drug screen  Trend procal  IVF  Pulm consult     CAD on DAPT  HTN  HLD  DM  COPD, not in exacerbation  Resume home meds   Hold BP meds given low BP on admission, re assess in am    Code Status: full   Surrogate Decision Maker: son   DVT Prophylaxis: lovenox  GI Prophylaxis: not indicated  Baseline: independent       Subjective:   CHIEF COMPLAINT: SOB, cough, left lateral mid back pain    HISTORY OF PRESENT ILLNESS:     Monia Galeazzi is a 81 y.o.  male who presents with the above CC. Pt started feeling sick 3-4 days ago, got much worse yesterday, started with cough, clear sputum, SOB, low grade fever, left flank/mid back pain. No sick contact. No CP, leg swelling/pain. No N/V/D/abd pain. No drug abuse, etoh abuse. No trouble swallowing. BP was low in ED, given 30 ml/kg IVF as alex sepsis protocol    CT CHEST WO CONT    Result Date: 2022  1. There is bilateral lower lobe pneumonia. The pneumonia in the left lower lobe is similar to the examination of 2022 at 1150. The airspace disease in the right lower lobe has actually worsened in this short time interval. This is consistent with bilateral lower lobe pneumonia. Follow-up to resolution is suggested.  2. There is a small left pleural effusion that is probably parapneumonic. 3. There is severe coronary artery calcification     CT ABD PELV WO CONT    Result Date: 12/23/2022  No renal or ureteral stone or evidence of hydronephrosis. Bilateral lower lobe consolidation, left greater than right. No acute intra-abdominal abnormality. XR CHEST PORT    Result Date: 12/23/2022  1. Airspace disease at the left base with possible left pleural effusion. Recommend follow-up to ensure resolution    We were asked to admit for work up and evaluation of the above problems. Past Medical History:   Diagnosis Date    Asthma     CAD (coronary artery disease) 1/6/2020    Oct 2019 PCI[facility]PINO LAD in [facility]    Diabetes [facility])     Hypertension     Ill-defined condition     lumbar fusion, cervical fusion    S/P cardiac cath 1/7/2020 1/7/2020 PCI/PINO to ostial LM/LAD        Past Surgical History:   Procedure Laterality Date    HX ORTHOPAEDIC      Lumbar and cervical fusion. HX OTHER SURGICAL      Morphine pump placed in back       Social History     Tobacco Use    Smoking status: Smoker, Current Status Unknown    Smokeless tobacco: Never   Substance Use Topics    Alcohol use: No        Family History   Problem Relation Age of Onset    Cancer Mother     Stroke Father      No Known Allergies     Prior to Admission medications    Medication Sig Start Date End Date Taking? Authorizing Provider   clopidogreL (Plavix) 75 mg tab Take 75 mg by mouth daily. Yes Provider, Historical   tamsulosin (Flomax) 0.4 mg capsule Take 0.4 mg by mouth daily. Yes Provider, Historical   aspirin delayed-release 81 mg tablet Take 1 Tab by mouth daily. 1/8/20  Yes Raul Flores NP   metoprolol tartrate (LOPRESSOR) 25 mg tablet Take 1 Tab by mouth every twelve (12) hours.  1/8/20  Yes Raul Flores NP   predniSONE (DELTASONE) 10 mg tablet 40mg two more days, then 20mg for 3 days then 10mg 1/8/20  Yes Raúl Perez MD   metFORMIN (GLUCOPHAGE) 500 mg tablet Take 1 Tab by mouth two (2) times daily (with meals). Please restart Metformin on 1/9/2020 1/8/20  Yes Key Ring NP   vit C/E/Zn/coppr/lutein/zeaxan (PRESERVISION AREDS-2 PO) Take 1 Tab by mouth two (2) times a day. Yes Provider, Historical   sub-q infusion pump (SUBCUTANEOUS INFUSION PUMP) by Does Not Apply route. Dilaudid Concentration: 20 mg/ml  Daily dose: 7.134 mg/day  Pump size: 20 cc  Last fill: 12/20/19  Next Fill: 1/31/20   Yes Provider, Historical   temazepam (RESTORIL) 30 mg capsule Take 30 mg by mouth nightly as needed for Sleep. Yes Provider, Historical   cyclobenzaprine (FLEXERIL) 10 mg tablet Take 10 mg by mouth three (3) times daily as needed for Muscle Spasm(s). Yes Provider, Historical   enalapril (VASOTEC) 10 mg tablet Take 10 mg by mouth two (2) times a day. Yes Provider, Historical   oxyCODONE-acetaminophen (PERCOCET 10)  mg per tablet Take 1 Tab by mouth every four (4) hours as needed for Pain. Indications: PAIN   Yes Provider, Historical   budesonide-formoterol (SYMBICORT) 160-4.5 mcg/actuation HFA inhaler Take 2 Puffs by inhalation daily. Yes Provider, Historical   ticagrelor (BRILINTA) 90 mg tablet Take 1 Tab by mouth every twelve (12) hours every twelve (12) hours. Patient not taking: Reported on 12/23/2022 1/8/20   Edwar Chaney NP   atorvastatin (LIPITOR) 40 mg tablet Take 40 mg by mouth daily. Patient not taking: Reported on 12/23/2022    Dominic, MD Chavo   apremilast 30 mg tab Take 30 mg by mouth two (2) times a day. 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  Patient not taking: Reported on 12/23/2022    Provider, Historical       REVIEW OF SYSTEMS:     Total of 12 systems reviewed, negative except for the above. I am not able to complete the review of systems because:    The patient is intubated and sedated    The patient has altered mental status due to his acute medical problems    The patient has baseline aphasia from prior stroke(s)    The patient has baseline dementia and is not reliable historian    The patient is in acute medical distress and unable to provide information             POSITIVE= underlined text  Negative = text not underlined  General:  fever, chills, sweats, generalized weakness, weight loss/gain,      loss of appetite   Eyes:    blurred vision, eye pain, loss of vision, double vision  ENT:    rhinorrhea, pharyngitis   Respiratory:   cough, sputum production, SOB, LUNA, wheezing, pleuritic pain   Cardiology:   chest pain, palpitations, orthopnea, PND, edema, syncope   Gastrointestinal:  abdominal pain , N/V, diarrhea, dysphagia, constipation, bleeding   Genitourinary:  frequency, urgency, dysuria, hematuria, incontinence   Muskuloskeletal :  arthralgia, myalgia, back pain  Hematology:  easy bruising, nose or gum bleeding, lymphadenopathy   Dermatological: rash, ulceration, pruritis, color change / jaundice  Endocrine:   hot flashes or polydipsia   Neurological:  headache, dizziness, confusion, focal weakness, paresthesia,     Speech difficulties, memory loss, gait difficulty  Psychological: Feelings of anxiety, depression, agitation    Objective:   VITALS:    Visit Vitals  BP (!) 113/55   Pulse 85   Temp 98.6 °F (37 °C)   Resp 12   Ht 6' 1\" (1.854 m)   Wt 97 kg (213 lb 13.5 oz)   SpO2 95%   BMI 28.21 kg/m²       Intake/Output Summary (Last 24 hours) at 12/24/2022 0117  Last data filed at 12/23/2022 1327  Gross per 24 hour   Intake 2960 ml   Output --   Net 2960 ml      Wt Readings from Last 10 Encounters:   12/23/22 97 kg (213 lb 13.5 oz)   01/07/20 102.5 kg (225 lb 15.5 oz)   08/24/19 90.7 kg (200 lb)   08/23/16 90.6 kg (199 lb 11.2 oz)   12/22/15 97.2 kg (214 lb 5 oz)       PHYSICAL EXAM:    General:    Alert, cooperative, no distress, appears stated age. HEENT: Atraumatic, anicteric sclerae, pink conjunctivae, MMM  Neck:  Supple, symmetrical  Lungs:   CTA with limited air entry. No Wheezing/Rhonchi. No rales. No tenderness  No Accessory muscle use. CVS:   Regular rhythm. No murmur. No JVD   GI/:   Soft, NT. ND.  BS normal  Extremities: No edema. No cyanosis. No clubbing. Skin:     Not pale. Not Jaundiced. No rashes   Psych:  Good insight. Not depressed. Not anxious or agitated. Neurologic: Alert and oriented X 4. EOMs intact. No facial asymmetry. No slurred speech. Symmetrical strength, Sensation grossly intact.     _______________________________________________________________________  Care Plan discussed with:    Comments   Patient x    Family      RN     Care Manager                    Consultant:      _______________________________________________________________________  Expected  Disposition:   Home with Family x   HH/PT/OT/RN    SNF/LTC    BRANDEE    ________________________________________________________________________  TOTAL TIME:  54 Minutes    Critical Care Provided     Minutes non procedure based      Comments    x Reviewed previous records   >50% of visit spent in counseling and coordination of care x Discussion with patient and/or family and questions answered       ________________________________________________________________________  Signed: Jarad Culp MD    Procedures: see electronic medical records for all procedures/Xrays and details which were not copied into this note but were reviewed prior to creation of Plan.     LAB DATA REVIEWED:    Recent Results (from the past 24 hour(s))   CBC WITH AUTOMATED DIFF    Collection Time: 12/23/22 11:28 AM   Result Value Ref Range    WBC 22.8 (H) 4.1 - 11.1 K/uL    RBC 4.21 4.10 - 5.70 M/uL    HGB 11.4 (L) 12.1 - 17.0 g/dL    HCT 35.3 (L) 36.6 - 50.3 %    MCV 83.8 80.0 - 99.0 FL    MCH 27.1 26.0 - 34.0 PG    MCHC 32.3 30.0 - 36.5 g/dL    RDW 15.9 (H) 11.5 - 14.5 %    PLATELET 815 059 - 504 K/uL    MPV 8.9 8.9 - 12.9 FL    NRBC 0.0 0  WBC    ABSOLUTE NRBC 0.00 0.00 - 0.01 K/uL    NEUTROPHILS 75 32 - 75 %    BAND NEUTROPHILS 16 % LYMPHOCYTES 2 (L) 12 - 49 %    MONOCYTES 7 5 - 13 %    EOSINOPHILS 0 0 - 7 %    BASOPHILS 0 0 - 1 %    IMMATURE GRANULOCYTES 0 0.0 - 0.5 %    ABS. NEUTROPHILS 20.7 (H) 1.8 - 8.0 K/UL    ABS. LYMPHOCYTES 0.5 (L) 0.8 - 3.5 K/UL    ABS. MONOCYTES 1.6 (H) 0.0 - 1.0 K/UL    ABS. EOSINOPHILS 0.0 0.0 - 0.4 K/UL    ABS. BASOPHILS 0.0 0.0 - 0.1 K/UL    ABS. IMM. GRANS. 0.0 0.00 - 0.04 K/UL    DF MANUAL      RBC COMMENTS NORMOCYTIC, NORMOCHROMIC      WBC COMMENTS VACUOLATED POLYS     METABOLIC PANEL, COMPREHENSIVE    Collection Time: 12/23/22 11:28 AM   Result Value Ref Range    Sodium 137 136 - 145 mmol/L    Potassium 5.2 (H) 3.5 - 5.1 mmol/L    Chloride 102 97 - 108 mmol/L    CO2 27 21 - 32 mmol/L    Anion gap 8 5 - 15 mmol/L    Glucose 233 (H) 65 - 100 mg/dL    BUN 28 (H) 6 - 20 MG/DL    Creatinine 1.54 (H) 0.70 - 1.30 MG/DL    BUN/Creatinine ratio 18 12 - 20      eGFR 48 (L) >60 ml/min/1.73m2    Calcium 8.6 8.5 - 10.1 MG/DL    Bilirubin, total 0.5 0.2 - 1.0 MG/DL    ALT (SGPT) 17 12 - 78 U/L    AST (SGOT) 6 (L) 15 - 37 U/L    Alk. phosphatase 49 45 - 117 U/L    Protein, total 5.8 (L) 6.4 - 8.2 g/dL    Albumin 3.0 (L) 3.5 - 5.0 g/dL    Globulin 2.8 2.0 - 4.0 g/dL    A-G Ratio 1.1 1.1 - 2.2     PROCALCITONIN    Collection Time: 12/23/22 11:28 AM   Result Value Ref Range    Procalcitonin 16.25 ng/mL   LIPASE    Collection Time: 12/23/22 11:28 AM   Result Value Ref Range    Lipase 55 (L) 73 - 393 U/L   SAMPLES BEING HELD    Collection Time: 12/23/22 11:28 AM   Result Value Ref Range    SAMPLES BEING HELD BLUE     COMMENT        Add-on orders for these samples will be processed based on acceptable specimen integrity and analyte stability, which may vary by analyte.    BLOOD GAS,CHEM8,LACTIC ACID POC    Collection Time: 12/23/22 11:30 AM   Result Value Ref Range    Calcium, ionized (POC) 1.21 1.12 - 1.32 mmol/L    BICARBONATE 28 mmol/L    Base excess (POC) 0.7 mmol/L    Sample source VENOUS BLOOD      CO2, POC 28 (H) 19 - 24 MMOL/L    Sodium,  136 - 145 MMOL/L    Potassium, POC 5.1 3.5 - 5.5 MMOL/L    Chloride, POC 99 (L) 100 - 108 MMOL/L    Glucose,  (H) 74 - 106 MG/DL    Creatinine, POC 1.2 0.6 - 1.3 MG/DL    Lactic Acid (POC) 2.46 (HH) 0.40 - 2.00 mmol/L    pH, venous (POC) 7.31 (L) 7.32 - 7.42      pCO2, venous (POC) 55.7 (H) 41 - 51 MMHG    pO2, venous (POC) 18 (L) 25 - 40 mmHg   TROPONIN-HIGH SENSITIVITY    Collection Time: 12/23/22 11:45 AM   Result Value Ref Range    Troponin-High Sensitivity 19 0 - 76 ng/L   COVID-19 RAPID TEST    Collection Time: 12/23/22 12:00 PM   Result Value Ref Range    Specimen source Nasopharyngeal      COVID-19 rapid test Not detected NOTD     INFLUENZA A+B VIRAL AGS    Collection Time: 12/23/22 12:00 PM   Result Value Ref Range    Influenza A Antigen Negative NEG      Influenza B Antigen Negative NEG     URINALYSIS W/ REFLEX CULTURE    Collection Time: 12/23/22  2:06 PM    Specimen: Urine   Result Value Ref Range    Color YELLOW/STRAW      Appearance CLEAR CLEAR      Specific gravity 1.016      pH (UA) 6.0 5.0 - 8.0      Protein Negative NEG mg/dL    Glucose Negative NEG mg/dL    Ketone Negative NEG mg/dL    Bilirubin Negative NEG      Blood Negative NEG      Urobilinogen 0.2 0.2 - 1.0 EU/dL    Nitrites Negative NEG      Leukocyte Esterase Negative NEG      WBC 0-4 0 - 4 /hpf    RBC 0-5 0 - 5 /hpf    Epithelial cells FEW FEW /lpf    Bacteria Negative NEG /hpf    UA:UC IF INDICATED CULTURE NOT INDICATED BY UA RESULT CNI      Hyaline cast 5-10 0 - 5 /lpf   POC LACTIC ACID    Collection Time: 12/23/22  2:07 PM   Result Value Ref Range    Lactic Acid (POC) 2.89 (HH) 0.40 - 2.00 mmol/L   POC LACTIC ACID    Collection Time: 12/23/22  5:13 PM   Result Value Ref Range    Lactic Acid (POC) 2.24 (HH) 0.40 - 2.00 mmol/L   DRUG SCREEN, URINE    Collection Time: 12/23/22  7:19 PM   Result Value Ref Range    AMPHETAMINES Negative NEG      BARBITURATES Negative NEG      BENZODIAZEPINES Positive (A) NEG COCAINE Negative NEG      METHADONE Negative NEG      OPIATES Positive (A) NEG      PCP(PHENCYCLIDINE) Negative NEG      THC (TH-CANNABINOL) Negative NEG      Drug screen comment (NOTE)    CULTURE, RESPIRATORY/SPUTUM/BRONCH W GRAM STAIN    Collection Time: 12/23/22  7:21 PM    Specimen: Sputum   Result Value Ref Range    Special Requests: NO SPECIAL REQUESTS      GRAM STAIN 2+ WBCS SEEN      GRAM STAIN RARE EPITHELIAL CELLS SEEN      GRAM STAIN FEW GRAM POSITIVE COCCI IN CLUSTERS      Culture result: PENDING      CT CHEST WO CONT    Result Date: 12/23/2022  1. There is bilateral lower lobe pneumonia. The pneumonia in the left lower lobe is similar to the examination of 12/23/2022 at 1150. The airspace disease in the right lower lobe has actually worsened in this short time interval. This is consistent with bilateral lower lobe pneumonia. Follow-up to resolution is suggested. 2. There is a small left pleural effusion that is probably parapneumonic. 3. There is severe coronary artery calcification     CT ABD PELV WO CONT    Result Date: 12/23/2022  No renal or ureteral stone or evidence of hydronephrosis. Bilateral lower lobe consolidation, left greater than right. No acute intra-abdominal abnormality. XR CHEST PORT    Result Date: 12/23/2022  1. Airspace disease at the left base with possible left pleural effusion. Recommend follow-up to ensure resolution    01/06/20    ECHO ADULT COMPLETE 01/07/2020 1/7/2020    Interpretation Summary  · Normal cavity size and diastolic function. Upper normal wall thickness. Low normal systolic dysfunction. Estimated left ventricular ejection fraction is 50 - 55%. · Aortic valve sclerosis with no significant stenosis.     Signed by: Baljeet Shelby MD on 1/7/2020  3:07 PM        Current Medications:     Current Facility-Administered Medications:     0.9% sodium chloride infusion, 125 mL/hr, IntraVENous, CONTINUOUS, Jennifer Pop MD, Last Rate: 125 mL/hr at 12/23/22 1713, 125 mL/hr at 12/23/22 1713    . PHARMACY TO SUBSTITUTE PER PROTOCOL (Reordered from: apremilast 30 mg tab), , , Per Protocol, Reji Pop MD    aspirin delayed-release tablet 81 mg, 81 mg, Oral, DAILY, Reji Pop MD    atorvastatin (LIPITOR) tablet 40 mg, 40 mg, Oral, DAILY, Reji Pop MD    arformoterol 15 mcg/budesonide 0.5 mg neb solution, , Nebulization, BID RT, Reji Pop MD, Given at 12/23/22 2235    cyclobenzaprine (FLEXERIL) tablet 10 mg, 10 mg, Oral, TID PRN, Reji Pop MD    metoprolol tartrate (LOPRESSOR) tablet 25 mg, 25 mg, Oral, Q12H, Reji Pop MD    temazepam (RESTORIL) capsule 30 mg, 30 mg, Oral, QHS PRN, Reji Pop MD    ticagrelor (BRILINTA) tablet 90 mg, 90 mg, Oral, Q12H, Reji Pop MD    sodium chloride (NS) flush 5-40 mL, 5-40 mL, IntraVENous, Q8H, Patricio Pop MD, 10 mL at 12/23/22 2213    sodium chloride (NS) flush 5-40 mL, 5-40 mL, IntraVENous, PRN, Reji Pop MD    acetaminophen (TYLENOL) tablet 650 mg, 650 mg, Oral, Q6H PRN **OR** acetaminophen (TYLENOL) suppository 650 mg, 650 mg, Rectal, Q6H PRN, Reji Pop MD    polyethylene glycol (MIRALAX) packet 17 g, 17 g, Oral, DAILY PRN, Reji Pop MD    ondansetron (ZOFRAN ODT) tablet 4 mg, 4 mg, Oral, Q8H PRN **OR** ondansetron (ZOFRAN) injection 4 mg, 4 mg, IntraVENous, Q6H PRN, Reji Pop MD    enoxaparin (LOVENOX) injection 40 mg, 40 mg, SubCUTAneous, DAILY, Reji Pop MD    doxycycline (VIBRAMYCIN) 100 mg in 0.9% sodium chloride (MBP/ADV) 100 mL MBP, 100 mg, IntraVENous, Q12H, Reji Pop MD, Last Rate: 100 mL/hr at 12/23/22 2206, 100 mg at 12/23/22 2206    cefTRIAXone (ROCEPHIN) 1 g in 0.9% sodium chloride (MBP/ADV) 50 mL MBP, 1 g, IntraVENous, Q24H, Patricio Pop MD    albuterol-ipratropium (DUO-NEB) 2.5 MG-0.5 MG/3 ML, 3 mL, Nebulization, Q4H PRN, Luther Sepulveda MD    **Can pt supply own Apremilast?  Please send message to RX to ID and Label**, 1 Each, Other, DAILY, Migdalia Pop MD    balsam peru-castor oiL (VENELEX) ointment, , Topical, BID, Migdalia Pop MD

## 2022-12-23 NOTE — ED PROVIDER NOTES
EMERGENCY DEPARTMENT HISTORY AND PHYSICAL EXAM      Date: 12/23/2022  Patient Name: Luís Webb    History of Presenting Illness     Chief Complaint   Patient presents with    Flank Pain     Patient with L flank pain that began last night and became worse this morning. EMS reports lethargy, fever, and cough en route as well. Patient reports he has dilaudid pain pump in abdomen, and son states there were \"pills all over bed this morning\". Patient a/o x4 but sleepy in triage. History Provided By: Patient    HPI: Luís Webb, 81 y.o. male with a past medical history significant for medical problems as stated below presents to the ED with cc of presenting with altered mental status, severe left flank pain, trouble breathing, cough over the last 3 to 4 days. Patient also reports low-grade fevers as well. Patient denies any urinary complaints, diarrhea or constipation. No vomiting. No sick contacts or recent travel. There are no associated symptoms. No other exacerbating or ameliorating factors. PCP: None    No current facility-administered medications on file prior to encounter. Current Outpatient Medications on File Prior to Encounter   Medication Sig Dispense Refill    aspirin delayed-release 81 mg tablet Take 1 Tab by mouth daily. 30 Tab 11    metoprolol tartrate (LOPRESSOR) 25 mg tablet Take 1 Tab by mouth every twelve (12) hours. 60 Tab 11    ticagrelor (BRILINTA) 90 mg tablet Take 1 Tab by mouth every twelve (12) hours every twelve (12) hours. 60 Tab 11    predniSONE (DELTASONE) 10 mg tablet 40mg two more days, then 20mg for 3 days then 10mg 20 Tab 0    metFORMIN (GLUCOPHAGE) 500 mg tablet Take 1 Tab by mouth two (2) times daily (with meals). Please restart Metformin on 1/9/2020 60 Tab 11    vit C/E/Zn/coppr/lutein/zeaxan (PRESERVISION AREDS-2 PO) Take 1 Tab by mouth two (2) times a day.  sub-q infusion pump (SUBCUTANEOUS INFUSION PUMP) by Does Not Apply route.  Dilaudid Concentration: 20 mg/ml  Daily dose: 7.134 mg/day  Pump size: 20 cc  Last fill: 12/20/19  Next Fill: 1/31/20      temazepam (RESTORIL) 30 mg capsule Take 30 mg by mouth nightly as needed for Sleep.  cyclobenzaprine (FLEXERIL) 10 mg tablet Take 10 mg by mouth three (3) times daily as needed for Muscle Spasm(s).  enalapril (VASOTEC) 10 mg tablet Take 10 mg by mouth two (2) times a day.  atorvastatin (LIPITOR) 40 mg tablet Take 40 mg by mouth daily.  apremilast 30 mg tab Take 30 mg by mouth two (2) times a day. 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 Indications: PAIN      budesonide-formoterol (SYMBICORT) 160-4.5 mcg/actuation HFA inhaler Take 2 Puffs by inhalation daily. Past History     Past Medical History:  Past Medical History:   Diagnosis Date    Asthma     CAD (coronary artery disease) 1/6/2020    Oct 2019 [facility]    Diabetes [facility])     Hypertension     Ill-defined condition     lumbar fusion, cervical fusion    S/P cardiac cath 1/7/2020 1/7/2020 PCI/PINO to ostial LM/LAD       Past Surgical History:  Past Surgical History:   Procedure Laterality Date    HX ORTHOPAEDIC      Lumbar and cervical fusion.  HX OTHER SURGICAL      Morphine pump placed in back       Family History:  Family History   Problem Relation Age of Onset    Cancer Mother     Stroke Father        Social History:  Social History     Tobacco Use    Smoking status: Smoker, Current Status Unknown    Smokeless tobacco: Never   Substance Use Topics    Alcohol use: No    Drug use: No       Allergies:  No Known Allergies      Review of Systems   Review of Systems   Constitutional:  Negative for chills, diaphoresis, fatigue and fever. HENT:  Negative for ear pain and sore throat. Eyes:  Negative for pain and redness.    Respiratory:  Positive for cough and shortness of breath. Cardiovascular:  Negative for chest pain and leg swelling. Gastrointestinal:  Negative for abdominal pain, diarrhea, nausea and vomiting. Endocrine: Negative for cold intolerance and heat intolerance. Genitourinary:  Positive for flank pain. Negative for hematuria. Musculoskeletal:  Negative for back pain and neck stiffness. Skin:  Negative for rash and wound. Neurological:  Negative for dizziness, syncope and headaches. Psychiatric/Behavioral:  Positive for confusion. All other systems reviewed and are negative. Physical Exam   Physical Exam  Vitals and nursing note reviewed. Constitutional:       General: He is in acute distress. Appearance: He is well-developed. He is ill-appearing. HENT:      Head: Normocephalic and atraumatic. Mouth/Throat:      Mouth: Mucous membranes are dry. Pharynx: No oropharyngeal exudate. Eyes:      Conjunctiva/sclera: Conjunctivae normal.      Pupils: Pupils are equal, round, and reactive to light. Cardiovascular:      Rate and Rhythm: Regular rhythm. Tachycardia present. Heart sounds: No murmur heard. Pulmonary:      Effort: Pulmonary effort is normal. No respiratory distress. Breath sounds: Normal breath sounds. No wheezing. Abdominal:      General: Bowel sounds are normal. There is no distension. Palpations: Abdomen is soft. Tenderness: There is abdominal tenderness in the left lower quadrant. There is left CVA tenderness. There is no right CVA tenderness, guarding or rebound. Musculoskeletal:         General: No deformity. Normal range of motion. Cervical back: Normal range of motion. Skin:     General: Skin is warm and dry. Findings: No rash. Neurological:      Mental Status: He is alert and oriented to person, place, and time. Cranial Nerves: No cranial nerve deficit. Sensory: No sensory deficit. Motor: No weakness.       Coordination: Coordination normal. Gait: Gait normal.   Psychiatric:         Behavior: Behavior normal.       Diagnostic Study Results     Labs -     Recent Results (from the past 24 hour(s))   CBC WITH AUTOMATED DIFF    Collection Time: 12/23/22 11:28 AM   Result Value Ref Range    WBC 22.8 (H) 4.1 - 11.1 K/uL    RBC 4.21 4.10 - 5.70 M/uL    HGB 11.4 (L) 12.1 - 17.0 g/dL    HCT 35.3 (L) 36.6 - 50.3 %    MCV 83.8 80.0 - 99.0 FL    MCH 27.1 26.0 - 34.0 PG    MCHC 32.3 30.0 - 36.5 g/dL    RDW 15.9 (H) 11.5 - 14.5 %    PLATELET 709 185 - 575 K/uL    MPV 8.9 8.9 - 12.9 FL    NRBC 0.0 0  WBC    ABSOLUTE NRBC 0.00 0.00 - 0.01 K/uL    NEUTROPHILS 75 32 - 75 %    BAND NEUTROPHILS 16 %    LYMPHOCYTES 2 (L) 12 - 49 %    MONOCYTES 7 5 - 13 %    EOSINOPHILS 0 0 - 7 %    BASOPHILS 0 0 - 1 %    IMMATURE GRANULOCYTES 0 0.0 - 0.5 %    ABS. NEUTROPHILS 20.7 (H) 1.8 - 8.0 K/UL    ABS. LYMPHOCYTES 0.5 (L) 0.8 - 3.5 K/UL    ABS. MONOCYTES 1.6 (H) 0.0 - 1.0 K/UL    ABS. EOSINOPHILS 0.0 0.0 - 0.4 K/UL    ABS. BASOPHILS 0.0 0.0 - 0.1 K/UL    ABS. IMM. GRANS. 0.0 0.00 - 0.04 K/UL    DF MANUAL      RBC COMMENTS NORMOCYTIC, NORMOCHROMIC      WBC COMMENTS VACUOLATED POLYS     METABOLIC PANEL, COMPREHENSIVE    Collection Time: 12/23/22 11:28 AM   Result Value Ref Range    Sodium 137 136 - 145 mmol/L    Potassium 5.2 (H) 3.5 - 5.1 mmol/L    Chloride 102 97 - 108 mmol/L    CO2 27 21 - 32 mmol/L    Anion gap 8 5 - 15 mmol/L    Glucose 233 (H) 65 - 100 mg/dL    BUN 28 (H) 6 - 20 MG/DL    Creatinine 1.54 (H) 0.70 - 1.30 MG/DL    BUN/Creatinine ratio 18 12 - 20      eGFR 48 (L) >60 ml/min/1.73m2    Calcium 8.6 8.5 - 10.1 MG/DL    Bilirubin, total 0.5 0.2 - 1.0 MG/DL    ALT (SGPT) 17 12 - 78 U/L    AST (SGOT) 6 (L) 15 - 37 U/L    Alk.  phosphatase 49 45 - 117 U/L    Protein, total 5.8 (L) 6.4 - 8.2 g/dL    Albumin 3.0 (L) 3.5 - 5.0 g/dL    Globulin 2.8 2.0 - 4.0 g/dL    A-G Ratio 1.1 1.1 - 2.2     PROCALCITONIN    Collection Time: 12/23/22 11:28 AM   Result Value Ref Range Procalcitonin 16.25 ng/mL   LIPASE    Collection Time: 12/23/22 11:28 AM   Result Value Ref Range    Lipase 55 (L) 73 - 393 U/L   SAMPLES BEING HELD    Collection Time: 12/23/22 11:28 AM   Result Value Ref Range    SAMPLES BEING HELD BLUE     COMMENT        Add-on orders for these samples will be processed based on acceptable specimen integrity and analyte stability, which may vary by analyte. BLOOD GAS,CHEM8,LACTIC ACID POC    Collection Time: 12/23/22 11:30 AM   Result Value Ref Range    Calcium, ionized (POC) 1.21 1.12 - 1.32 mmol/L    BICARBONATE 28 mmol/L    Base excess (POC) 0.7 mmol/L    Sample source VENOUS BLOOD      CO2, POC 28 (H) 19 - 24 MMOL/L    Sodium,  136 - 145 MMOL/L    Potassium, POC 5.1 3.5 - 5.5 MMOL/L    Chloride, POC 99 (L) 100 - 108 MMOL/L    Glucose,  (H) 74 - 106 MG/DL    Creatinine, POC 1.2 0.6 - 1.3 MG/DL    Lactic Acid (POC) 2.46 (HH) 0.40 - 2.00 mmol/L    pH, venous (POC) 7.31 (L) 7.32 - 7.42      pCO2, venous (POC) 55.7 (H) 41 - 51 MMHG    pO2, venous (POC) 18 (L) 25 - 40 mmHg   TROPONIN-HIGH SENSITIVITY    Collection Time: 12/23/22 11:45 AM   Result Value Ref Range    Troponin-High Sensitivity 19 0 - 76 ng/L   COVID-19 RAPID TEST    Collection Time: 12/23/22 12:00 PM   Result Value Ref Range    Specimen source Nasopharyngeal      COVID-19 rapid test Not detected NOTD     INFLUENZA A+B VIRAL AGS    Collection Time: 12/23/22 12:00 PM   Result Value Ref Range    Influenza A Antigen Negative NEG      Influenza B Antigen Negative NEG     POC LACTIC ACID    Collection Time: 12/23/22  2:07 PM   Result Value Ref Range    Lactic Acid (POC) 2.89 (HH) 0.40 - 2.00 mmol/L       Radiologic Studies -   XR CHEST PORT   Final Result   1. Airspace disease at the left base with possible left pleural effusion. Recommend follow-up to ensure resolution      CT ABD PELV WO CONT   Final Result   No renal or ureteral stone or evidence of hydronephrosis.  Bilateral lower lobe   consolidation, left greater than right. No acute intra-abdominal abnormality. CT Results  (Last 48 hours)                 12/23/22 1151  CT ABD PELV WO CONT Final result    Impression:  No renal or ureteral stone or evidence of hydronephrosis. Bilateral lower lobe   consolidation, left greater than right. No acute intra-abdominal abnormality. Narrative:  EXAM: CT ABD PELV WO CONT       INDICATION: sepsis, severe left flank pain       COMPARISON: None       IV CONTRAST: None. ORAL CONTRAST: None       TECHNIQUE:    Thin axial images were obtained through the abdomen and pelvis. Coronal and   sagittal reformats were generated. CT dose reduction was achieved through use of   a standardized protocol tailored for this examination and automatic exposure   control for dose modulation. The absence of intravenous contrast material reduces the sensitivity for   evaluation of the vasculature and solid organs. FINDINGS:    LOWER THORAX: Significant left lower lobe infiltrate and lesser right lower lobe   infiltrate. LIVER: No mass. BILIARY TREE: Gallbladder is within normal limits. CBD is not dilated. SPLEEN: within normal limits. PANCREAS: No focal abnormality. ADRENALS: Unremarkable. KIDNEYS/URETERS: No calculus or hydronephrosis. STOMACH: Unremarkable. SMALL BOWEL: No dilatation or wall thickening. COLON: No dilatation or wall thickening. APPENDIX: Within normal limits. PERITONEUM: No ascites or pneumoperitoneum. RETROPERITONEUM: No lymphadenopathy or aortic aneurysm. REPRODUCTIVE ORGANS: Unremarkable. URINARY BLADDER: No mass or calculus. BONES: Postoperative changes are seen in the lumbar spine. Chronic compression   fractures of T9, T10, and L3 are noted. ABDOMINAL WALL: Right lower quadrant ventral hernia containing small bowel   without evidence of obstruction.    ADDITIONAL COMMENTS: N/A                 CXR Results  (Last 48 hours)                 12/23/22 1235  XR CHEST PORT Final result    Impression:  1. Airspace disease at the left base with possible left pleural effusion. Recommend follow-up to ensure resolution       Narrative:  INDICATION:  Evaluate for infiltrate        Exam: Portable chest 1214. Comparison: 1/6/2020. Findings: Cardiomediastinal silhouette is within normal limits. Pulmonary   vasculature is not engorged. Left retrocardiac airspace disease. Possible small   left pleural effusion. No pneumothorax. No right-sided airspace disease. Medical Decision Making   I am the first provider for this patient. I reviewed the vital signs, available nursing notes, past medical history, past surgical history, family history and social history. Vital Signs-Reviewed the patient's vital signs. Patient Vitals for the past 12 hrs:   Temp Pulse Resp BP SpO2   12/23/22 1412 99.2 °F (37.3 °C) -- -- -- --   12/23/22 1407 -- 70 14 104/62 94 %   12/23/22 1201 -- 89 15 101/61 96 %   12/23/22 1135 -- 89 19 (!) 102/57 94 %   12/23/22 1100 100.3 °F (37.9 °C) 95 18 (!) 87/43 99 %       Records Reviewed: Nursing records and medical records reviewed    MDM:    Medical Decision Making  Patient is a 37-year-old male presenting with acute encephalopathy, fever, leukocytosis, lactic acidosis, and acute kidney injury. Patient has multi medical problems include coronary artery disease with history of bypass surgery. Patient was found subsequently to have multilobar pneumonia, acute kidney injury, and acute cephalopathy. Was treated with 30 cc/kg bolus to treat hypotension which resolved. Was given broad-spectrum antibiotics. These were given intravenously patient tolerated well. Will admit to the hospital service for further work-up and management.     Problems Addressed:  Acute encephalopathy: complicated acute illness or injury with systemic symptoms that poses a threat to life or bodily functions  Community acquired pneumonia, unspecified laterality: acute illness or injury with systemic symptoms  Severe sepsis [facility]): acute illness or injury with systemic symptoms    Amount and/or Complexity of Data Reviewed  Labs: ordered. Radiology: independent interpretation performed. Details: Multilobar pneumonia on chest x-ray    Risk  Decision regarding hospitalization. Provider Notes (Medical Decision Making):         ED Course:   Initial assessment performed. The patients presenting problems have been discussed, and they are in agreement with the care plan formulated and outlined with them. I have encouraged them to ask questions as they arise throughout their visit. ED Course as of 12/23/22 1417   Fri Dec 23, 2022   1139 ED [facility] ED SEPSIS NOTE:     11:39 AM The patient now meets criteria for: Severe Sepsis    Fluid resuscitation with: 30 mL/kg crystalloid bolus  Due to concern for rapidly advancing infection and deterioration of patient's condition, antibiotics are started STAT and cultures ordered. [CC]   1356 Patient is doing much better, blood pressure is normalized, no indication is at this time.   We will admit for IV antibiotics and fluid resuscitation. [CC]      ED Course User Index  [CC] Nancie Bennett MD       Medications Administered       acetaminophen (TYLENOL) tablet 650 mg       Admin Date  12/23/2022 Action  Given Dose  650 mg Route  Oral Administered By  Igor Jenkins RN              azithromycin (ZITHROMAX) 500 mg in 0.9% sodium chloride 250 mL ([id number])       Admin Date  12/23/2022 Action  New Bag Dose  500 mg Rate  250 mL/hr Route  IntraVENous Administered By  Igor Jenkins RN              cefTRIAXone (ROCEPHIN) 1 g in 0.9% sodium chloride (MBP/ADV) 50 mL MBP       Admin Date  12/23/2022 Action  New Bag Dose  1 g Rate  100 mL/hr Route  IntraVENous Administered By  Igor Jenkins RN              lactated ringers bolus infusion 1,000 mL       Admin Date  12/23/2022 Action  New Bag Dose  1,000 mL Rate  1,455 mL/hr Route  IntraVENous Administered By  Pattie Hashimoto, RN               Admin Date  12/23/2022 Action  New Bag Dose  1,000 mL Rate  1,455 mL/hr Route  IntraVENous Administered By  Pattie Hashimoto, RN              lactated ringers bolus infusion 2,910 mL       Admin Date  12/23/2022 Action  New Bag Dose  2,910 mL Rate  1,455 mL/hr Route  IntraVENous Administered By  Pattie Hashimoto, RN              lactated ringers bolus infusion 910 mL       Admin Date  12/23/2022 Action  New Bag Dose  910 mL Rate  1,455 mL/hr Route  IntraVENous Administered By  Pattie Hashimoto, RN                        Critical Care:  I have spent 35 minutes of critical care time in evaluating and treating this patient. This includes time spent at bedside, time with family and decision makers, documentation, review of labs and imaging, and/or consultation with specialists. It does not include time spent on separately billed procedures. This patient presents with a critical illness or injury that acutely impairs one or more vital organ systems such that there is a high probability of imminent or life threatening deterioration in the patient's condition. This case involved decision making of high complexity to assess, manipulate, and support vital organ system failure and/or to prevent further life threatening deterioration of the patient's condition. Failure to initiate these interventions on an urgent basis would likely result in sudden, clinically significant or life threatening deterioration in the patient's condition. Abnormal findings supporting critical care: Severe sepsis, encephalopathy  Interventions to support critical care: 30 cc/kg bolus, broad-spectrum antibiotics  Failure to intervene may result in: Progression to septic shock        Disposition:  Admit Note:  2:17 PM  Pt is being admitted by Dr. Shanelle Yoon. The results of their tests and reason(s) for their admission have been discussed with pt and/or available family. They convey agreement and understanding for the need to be admitted and for admission diagnosis. Diagnosis     Clinical Impression:   1. Severe sepsis ([address]    2. Community acquired pneumonia, unspecified laterality    3. Acute encephalopathy        Attestations:    Lela Parrish MD    Please note that this dictation was completed with Zollo, the computer voice recognition software. Quite often unanticipated grammatical, syntax, homophones, and other interpretive errors are inadvertently transcribed by the computer software. Please disregard these errors. Please excuse any errors that have escaped final proofreading. Thank you.

## 2022-12-23 NOTE — PROGRESS NOTES
1830: Patient received on unit. Vitals completed on arrival.  Dual skin and admission database complete. 1845: Pulmonology consult called, urine and sputum culture sent to lab. End of Shift Note    Bedside shift change report given to Marga Pond RN (oncoming nurse) by Missy Otto RN (offgoing nurse). Report included the following information SBAR, Kardex, Intake/Output, MAR, Recent Results, and Cardiac Rhythm sinus rhythm    Shift worked:  [id number]     Shift summary and any significant changes:     PTA med list reviewed with patient; patient states he takes flomax and plavix-added to list     Concerns for physician to address:       Zone phone for oncoming shift:          Activity:     Number times ambulated in hallways past shift: 2  Number of times OOB to chair past shift: 1    Cardiac:   Cardiac Monitoring: Yes      Cardiac Rhythm: Sinus Rhythm    Access:  Current line(s): PIV     Genitourinary:   Urinary status: voiding    Respiratory:      Chronic home O2 use?: NO  Incentive spirometer at bedside: NO       GI:     Current diet:  ADULT DIET Regular; 4 carb choices (60 gm/meal); Low Fat/Low Chol/High Fiber/2 gm Na;  Low Sodium (2 gm)  Passing flatus: YES  Tolerating current diet: YES       Pain Management:   Patient states pain is manageable on current regimen: YES    Skin:  Sreekanth Score: 17  Interventions: turn team, float heels, increase time out of bed, and limit briefs    Patient Safety:  Fall Score:    Interventions: bed/chair alarm, assistive device (walker, cane, etc), gripper socks, pt to call before getting OOB, and stay with me (per policy)       Length of Stay:  Expected LOS: - - -  Actual LOS: 0      Missy Otto RN

## 2022-12-24 LAB
ALBUMIN SERPL-MCNC: 2.8 G/DL (ref 3.5–5)
ALBUMIN/GLOB SERPL: 0.9 {RATIO} (ref 1.1–2.2)
ALP SERPL-CCNC: 48 U/L (ref 45–117)
ALT SERPL-CCNC: 13 U/L (ref 12–78)
ANION GAP SERPL CALC-SCNC: 5 MMOL/L (ref 5–15)
AST SERPL-CCNC: 5 U/L (ref 15–37)
ATRIAL RATE: 86 BPM
BASOPHILS # BLD: 0 K/UL (ref 0–0.1)
BASOPHILS NFR BLD: 0 % (ref 0–1)
BILIRUB SERPL-MCNC: 0.4 MG/DL (ref 0.2–1)
BUN SERPL-MCNC: 25 MG/DL (ref 6–20)
BUN/CREAT SERPL: 25 (ref 12–20)
CALCIUM SERPL-MCNC: 8.7 MG/DL (ref 8.5–10.1)
CALCULATED P AXIS, ECG09: 50 DEGREES
CALCULATED R AXIS, ECG10: 45 DEGREES
CALCULATED T AXIS, ECG11: 46 DEGREES
CHLORIDE SERPL-SCNC: 104 MMOL/L (ref 97–108)
CO2 SERPL-SCNC: 30 MMOL/L (ref 21–32)
CREAT SERPL-MCNC: 1 MG/DL (ref 0.7–1.3)
DIAGNOSIS, 93000: NORMAL
DIFFERENTIAL METHOD BLD: ABNORMAL
EOSINOPHIL # BLD: 0.2 K/UL (ref 0–0.4)
EOSINOPHIL NFR BLD: 1 % (ref 0–7)
ERYTHROCYTE [DISTWIDTH] IN BLOOD BY AUTOMATED COUNT: 16 % (ref 11.5–14.5)
GLOBULIN SER CALC-MCNC: 3 G/DL (ref 2–4)
GLUCOSE SERPL-MCNC: 117 MG/DL (ref 65–100)
HCT VFR BLD AUTO: 34.5 % (ref 36.6–50.3)
HGB BLD-MCNC: 10.9 G/DL (ref 12.1–17)
IMM GRANULOCYTES # BLD AUTO: 0 K/UL (ref 0–0.04)
IMM GRANULOCYTES NFR BLD AUTO: 0 % (ref 0–0.5)
LYMPHOCYTES # BLD: 2 K/UL (ref 0.8–3.5)
LYMPHOCYTES NFR BLD: 10 % (ref 12–49)
MAGNESIUM SERPL-MCNC: 2.1 MG/DL (ref 1.6–2.4)
MCH RBC QN AUTO: 26.7 PG (ref 26–34)
MCHC RBC AUTO-ENTMCNC: 31.6 G/DL (ref 30–36.5)
MCV RBC AUTO: 84.4 FL (ref 80–99)
METAMYELOCYTES NFR BLD MANUAL: 1 %
MONOCYTES # BLD: 0.6 K/UL (ref 0–1)
MONOCYTES NFR BLD: 3 % (ref 5–13)
NEUTS BAND NFR BLD MANUAL: 3 %
NEUTS SEG # BLD: 16.7 K/UL (ref 1.8–8)
NEUTS SEG NFR BLD: 82 % (ref 32–75)
NRBC # BLD: 0 K/UL (ref 0–0.01)
NRBC BLD-RTO: 0 PER 100 WBC
P-R INTERVAL, ECG05: 128 MS
PLATELET # BLD AUTO: 204 K/UL (ref 150–400)
PMV BLD AUTO: 8.6 FL (ref 8.9–12.9)
POTASSIUM SERPL-SCNC: 4.4 MMOL/L (ref 3.5–5.1)
PROCALCITONIN SERPL-MCNC: 15.14 NG/ML
PROT SERPL-MCNC: 5.8 G/DL (ref 6.4–8.2)
Q-T INTERVAL, ECG07: 336 MS
QRS DURATION, ECG06: 90 MS
QTC CALCULATION (BEZET), ECG08: 402 MS
RBC # BLD AUTO: 4.09 M/UL (ref 4.1–5.7)
RBC MORPH BLD: ABNORMAL
SODIUM SERPL-SCNC: 139 MMOL/L (ref 136–145)
VENTRICULAR RATE, ECG03: 86 BPM
WBC # BLD AUTO: 19.7 K/UL (ref 4.1–11.1)

## 2022-12-24 PROCEDURE — 85025 COMPLETE CBC W/AUTO DIFF WBC: CPT

## 2022-12-24 PROCEDURE — 84145 PROCALCITONIN (PCT): CPT

## 2022-12-24 PROCEDURE — 36415 COLL VENOUS BLD VENIPUNCTURE: CPT

## 2022-12-24 PROCEDURE — 94640 AIRWAY INHALATION TREATMENT: CPT

## 2022-12-24 PROCEDURE — 83735 ASSAY OF MAGNESIUM: CPT

## 2022-12-24 PROCEDURE — 74011250636 HC RX REV CODE- 250/636: Performed by: GENERAL ACUTE CARE HOSPITAL

## 2022-12-24 PROCEDURE — 74011250636 HC RX REV CODE- 250/636: Performed by: PHYSICIAN ASSISTANT

## 2022-12-24 PROCEDURE — 65270000046 HC RM TELEMETRY

## 2022-12-24 PROCEDURE — 92610 EVALUATE SWALLOWING FUNCTION: CPT

## 2022-12-24 PROCEDURE — 74011000250 HC RX REV CODE- 250: Performed by: GENERAL ACUTE CARE HOSPITAL

## 2022-12-24 PROCEDURE — 74011250637 HC RX REV CODE- 250/637: Performed by: PHYSICIAN ASSISTANT

## 2022-12-24 PROCEDURE — 80053 COMPREHEN METABOLIC PANEL: CPT

## 2022-12-24 PROCEDURE — 74011000258 HC RX REV CODE- 258: Performed by: GENERAL ACUTE CARE HOSPITAL

## 2022-12-24 PROCEDURE — 74011250637 HC RX REV CODE- 250/637: Performed by: GENERAL ACUTE CARE HOSPITAL

## 2022-12-24 RX ORDER — TAMSULOSIN HYDROCHLORIDE 0.4 MG/1
0.4 CAPSULE ORAL DAILY
Status: DISCONTINUED | OUTPATIENT
Start: 2022-12-24 | End: 2022-12-25 | Stop reason: HOSPADM

## 2022-12-24 RX ORDER — CLOPIDOGREL BISULFATE 75 MG/1
75 TABLET ORAL DAILY
Status: DISCONTINUED | OUTPATIENT
Start: 2022-12-24 | End: 2022-12-25 | Stop reason: HOSPADM

## 2022-12-24 RX ORDER — MIDODRINE HYDROCHLORIDE 5 MG/1
5 TABLET ORAL
Status: DISCONTINUED | OUTPATIENT
Start: 2022-12-25 | End: 2022-12-25 | Stop reason: HOSPADM

## 2022-12-24 RX ORDER — PREDNISONE 10 MG/1
10 TABLET ORAL
Status: DISCONTINUED | OUTPATIENT
Start: 2022-12-25 | End: 2022-12-25 | Stop reason: HOSPADM

## 2022-12-24 RX ORDER — DILTIAZEM HYDROCHLORIDE 180 MG/1
180 CAPSULE, EXTENDED RELEASE ORAL DAILY
COMMUNITY

## 2022-12-24 RX ORDER — DILTIAZEM HYDROCHLORIDE 180 MG/1
180 CAPSULE, COATED, EXTENDED RELEASE ORAL DAILY
Status: DISCONTINUED | OUTPATIENT
Start: 2022-12-24 | End: 2022-12-25 | Stop reason: HOSPADM

## 2022-12-24 RX ORDER — KETOROLAC TROMETHAMINE 30 MG/ML
15 INJECTION, SOLUTION INTRAMUSCULAR; INTRAVENOUS
Status: DISCONTINUED | OUTPATIENT
Start: 2022-12-24 | End: 2022-12-25 | Stop reason: HOSPADM

## 2022-12-24 RX ADMIN — ARFORMOTEROL TARTRATE: 15 SOLUTION RESPIRATORY (INHALATION) at 19:47

## 2022-12-24 RX ADMIN — METOPROLOL TARTRATE 25 MG: 25 TABLET, FILM COATED ORAL at 09:20

## 2022-12-24 RX ADMIN — DOXYCYCLINE 100 MG: 100 INJECTION, POWDER, LYOPHILIZED, FOR SOLUTION INTRAVENOUS at 21:10

## 2022-12-24 RX ADMIN — CLOPIDOGREL BISULFATE 75 MG: 75 TABLET ORAL at 09:20

## 2022-12-24 RX ADMIN — DOXYCYCLINE 100 MG: 100 INJECTION, POWDER, LYOPHILIZED, FOR SOLUTION INTRAVENOUS at 09:20

## 2022-12-24 RX ADMIN — SODIUM CHLORIDE, PRESERVATIVE FREE 10 ML: 5 INJECTION INTRAVENOUS at 06:10

## 2022-12-24 RX ADMIN — ACETAMINOPHEN 650 MG: 325 TABLET ORAL at 17:25

## 2022-12-24 RX ADMIN — SODIUM CHLORIDE, PRESERVATIVE FREE 10 ML: 5 INJECTION INTRAVENOUS at 21:02

## 2022-12-24 RX ADMIN — ENOXAPARIN SODIUM 40 MG: 100 INJECTION SUBCUTANEOUS at 09:22

## 2022-12-24 RX ADMIN — SODIUM CHLORIDE, PRESERVATIVE FREE 10 ML: 5 INJECTION INTRAVENOUS at 17:26

## 2022-12-24 RX ADMIN — TAMSULOSIN HYDROCHLORIDE 0.4 MG: 0.4 CAPSULE ORAL at 09:20

## 2022-12-24 RX ADMIN — SODIUM CHLORIDE 1 G: 900 INJECTION INTRAVENOUS at 11:36

## 2022-12-24 RX ADMIN — KETOROLAC TROMETHAMINE 15 MG: 30 INJECTION, SOLUTION INTRAMUSCULAR at 21:01

## 2022-12-24 RX ADMIN — MIDODRINE HYDROCHLORIDE 5 MG: 5 TABLET ORAL at 23:19

## 2022-12-24 RX ADMIN — CASTOR OIL AND BALSAM, PERU: 788; 87 OINTMENT TOPICAL at 09:00

## 2022-12-24 RX ADMIN — ARFORMOTEROL TARTRATE: 15 SOLUTION RESPIRATORY (INHALATION) at 08:15

## 2022-12-24 RX ADMIN — ASPIRIN 81 MG: 81 TABLET, COATED ORAL at 09:22

## 2022-12-24 RX ADMIN — ACETAMINOPHEN 650 MG: 325 TABLET ORAL at 09:22

## 2022-12-24 RX ADMIN — IPRATROPIUM BROMIDE AND ALBUTEROL SULFATE 3 ML: .5; 3 SOLUTION RESPIRATORY (INHALATION) at 08:18

## 2022-12-24 RX ADMIN — DILTIAZEM HYDROCHLORIDE 180 MG: 180 CAPSULE, COATED, EXTENDED RELEASE ORAL at 23:19

## 2022-12-24 RX ADMIN — TEMAZEPAM 30 MG: 30 CAPSULE ORAL at 21:09

## 2022-12-24 RX ADMIN — ATORVASTATIN CALCIUM 40 MG: 40 TABLET, FILM COATED ORAL at 09:22

## 2022-12-24 NOTE — CONSULTS
Pulmonary, Critical Care, and Sleep Medicine    Initial Patient Consult    Name: Bailey Levy MRN: [id number]   : 1950 Hospital: [facility]   Date: 2022          IMPRESSION:   Bilateral Lower lobe Pneumonia, was noted to be worse  on CT scan imaging. Small left parapneumonic effusion  Hx of COPD, last exacerbation about 3 months ago. 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 Remote ex smoker  Visiting from [place]. RECOMMENDATIONS:   AGree with Ctx and Doxycycline  Repeat CXR in am.   DVT prophylaxis  Can assess with PT and OT. Will follow     Subjective: This patient has been seen and evaluated at the request of Dr. Jean-Pierre Savage for above. Patient is a 81 y.o. male   Who is visiting from [place]. He has been feeling progressively worse for the last 1 month. Has been on WILMA that he uses several times per day. He his on chronic prednisone 10mg po every day. Has been with worsening sinus issues. Denies any GERD or aspiration symptoms. His last Abx was amoxicillin about 3 months ago. Past Medical History:   Diagnosis Date    Asthma     CAD (coronary artery disease) 2020    Oct 2019 PCI/PINO LAD in OS    Diabetes [facility])     Hypertension     Ill-defined condition     lumbar fusion, cervical fusion    S/P cardiac cath 2020 PCI/PINO to ostial LM/LAD      Past Surgical History:   Procedure Laterality Date    HX ORTHOPAEDIC      Lumbar and cervical fusion. HX OTHER SURGICAL      Morphine pump placed in back      Prior to Admission medications    Medication Sig Start Date End Date Taking? Authorizing Provider   clopidogreL (Plavix) 75 mg tab Take 75 mg by mouth daily. Yes Provider, Historical   tamsulosin (Flomax) 0.4 mg capsule Take 0.4 mg by mouth daily. Yes Provider, Historical   aspirin delayed-release 81 mg tablet Take 1 Tab by mouth daily.  20  Yes Raul Flores NP   metoprolol tartrate (LOPRESSOR) 25 mg tablet Take 1 Tab by mouth every twelve (12) hours. 1/8/20  Yes Olinda Albrecht NP   predniSONE (DELTASONE) 10 mg tablet 40mg two more days, then 20mg for 3 days then 10mg 1/8/20  Yes Shyam Stewart MD   metFORMIN (GLUCOPHAGE) 500 mg tablet Take 1 Tab by mouth two (2) times daily (with meals). Please restart Metformin on 1/9/2020 1/8/20  Yes Key Ring NP   vit C/E/Zn/coppr/lutein/zeaxan (PRESERVISION AREDS-2 PO) Take 1 Tab by mouth two (2) times a day. Yes Provider, Historical   sub-q infusion pump (SUBCUTANEOUS INFUSION PUMP) by Does Not Apply route. Dilaudid Concentration: 20 mg/ml  Daily dose: 7.134 mg/day  Pump size: 20 cc  Last fill: 12/20/19  Next Fill: 1/31/20   Yes Provider, Historical   temazepam (RESTORIL) 30 mg capsule Take 30 mg by mouth nightly as needed for Sleep. Yes Provider, Historical   cyclobenzaprine (FLEXERIL) 10 mg tablet Take 10 mg by mouth three (3) times daily as needed for Muscle Spasm(s). Yes Provider, Historical   enalapril (VASOTEC) 10 mg tablet Take 10 mg by mouth two (2) times a day. Yes Provider, Historical   oxyCODONE-acetaminophen (PERCOCET 10)  mg per tablet Take 1 Tab by mouth every four (4) hours as needed for Pain. Indications: PAIN   Yes Provider, Historical   budesonide-formoterol (SYMBICORT) 160-4.5 mcg/actuation HFA inhaler Take 2 Puffs by inhalation daily. Yes Provider, Historical   ticagrelor (BRILINTA) 90 mg tablet Take 1 Tab by mouth every twelve (12) hours every twelve (12) hours. Patient not taking: Reported on 12/23/2022 1/8/20   Olinda Albrecht NP   atorvastatin (LIPITOR) 40 mg tablet Take 40 mg by mouth daily. Patient not taking: Reported on 12/23/2022    Other, MD Chavo   apremilast 30 mg tab Take 30 mg by mouth two (2) times a day.  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  Patient not taking: Reported on 12/23/2022    Provider, Historical     No Known Allergies   Social History     Tobacco Use    Smoking status: Smoker, Current Status Unknown    Smokeless tobacco: Never   Substance Use Topics    Alcohol use: No      Family History   Problem Relation Age of Onset    Cancer Mother     Stroke Father         Current Facility-Administered Medications   Medication Dose Route Frequency    tamsulosin (FLOMAX) capsule 0.4 mg  0.4 mg Oral DAILY    clopidogreL (PLAVIX) tablet 75 mg  75 mg Oral DAILY    [Held by provider] 0.9% sodium chloride infusion  125 mL/hr IntraVENous CONTINUOUS    apremilast tab 30 mg (Patient Supplied)  30 mg Oral BID    aspirin delayed-release tablet 81 mg  81 mg Oral DAILY    atorvastatin (LIPITOR) tablet 40 mg  40 mg Oral DAILY    arformoterol 15 mcg/budesonide 0.5 mg neb solution   Nebulization BID RT    metoprolol tartrate (LOPRESSOR) tablet 25 mg  25 mg Oral Q12H    sodium chloride (NS) flush 5-40 mL  5-40 mL IntraVENous Q8H    enoxaparin (LOVENOX) injection 40 mg  40 mg SubCUTAneous DAILY    doxycycline (VIBRAMYCIN) 100 mg in 0.9% sodium chloride (MBP/ADV) 100 mL MBP  100 mg IntraVENous Q12H    cefTRIAXone (ROCEPHIN) 1 g in 0.9% sodium chloride (MBP/ADV) 50 mL MBP  1 g IntraVENous Q24H    balsam peru-castor oiL (VENELEX) ointment   Topical BID       Review of Systems:  Constitutional: positive for fatigue  Eyes: negative  Ears, nose, mouth, throat, and face: negative  Respiratory: positive for cough, sputum, wheezing, dyspnea on exertion, or chronic bronchitis  Cardiovascular: negative  Gastrointestinal: negative  Genitourinary:negative  Integument/breast: negative  Hematologic/lymphatic: negative  Musculoskeletal:negative  Neurological: negative  Behavioral/Psych: negative  Endocrine: negative  Allergic/Immunologic: negative    Objective:   Vital Signs:    Visit Vitals  /61   Pulse 91   Temp 99.9 °F (37.7 °C)   Resp 18   Ht 6' 1\" (1.854 m)   Wt 97 kg (213 lb 13.5 oz)   SpO2 94%   BMI 28.21 kg/m²       O2 Device: None (Room air)       Temp (24hrs), Av.8 °F (37.1 °C), Min:98 °F (36.7 °C), Max:99.9 °F (37.7 °C)       Intake/Output:   Last shift:      No intake/output data recorded. Last 3 shifts: 12/22 1901 - 12/24 0700  In: 2960 [I.V.:2960]  Out: 1400 [Urine:1400]    Intake/Output Summary (Last 24 hours) at 12/24/2022 1205  Last data filed at 12/24/2022 0505  Gross per 24 hour   Intake 2960 ml   Output 1400 ml   Net 1560 ml      Physical Exam:   General:  Alert, cooperative, no distress, appears stated age. Head:  Normocephalic, without obvious abnormality, atraumatic. Eyes:  Conjunctivae/corneas clear. PERRL, EOMs intact. Nose: Nares normal. Septum midline. Mucosa normal. No drainage or sinus tenderness. Throat: Lips, mucosa, and tongue normal. Teeth and gums normal.   Neck: Supple, symmetrical, trachea midline, no adenopathy, thyroid: no enlargment/tenderness/nodules, no carotid bruit and no JVD. Back:   Symmetric, no curvature. ROM normal.   Lungs:   Clear to auscultation bilaterally. Some decreased BS in bases. Chest wall:  No tenderness or deformity. Heart:  Regular rate and rhythm, S1, S2 normal, no murmur, click, rub or gallop. Abdomen:   Soft, non-tender. Bowel sounds normal. No masses,  No organomegaly. Extremities: Extremities normal, atraumatic, no cyanosis or edema. Pulses: 2+ and symmetric all extremities. Skin: Skin color, texture, turgor normal. No rashes or lesions   Lymph nodes: Cervical, supraclavicular, and axillary nodes normal.   Neurologic: Grossly nonfocal, motor intact.       Data review:     Recent Results (from the past 24 hour(s))   URINALYSIS W/ REFLEX CULTURE    Collection Time: 12/23/22  2:06 PM    Specimen: Urine   Result Value Ref Range    Color YELLOW/STRAW      Appearance CLEAR CLEAR      Specific gravity 1.016      pH (UA) 6.0 5.0 - 8.0      Protein Negative NEG mg/dL    Glucose Negative NEG mg/dL    Ketone Negative NEG mg/dL    Bilirubin Negative NEG      Blood Negative NEG      Urobilinogen 0.2 0.2 - 1.0 EU/dL    Nitrites Negative NEG      Leukocyte Esterase Negative NEG      WBC 0-4 0 - 4 /hpf    RBC 0-5 0 - 5 /hpf    Epithelial cells FEW FEW /lpf    Bacteria Negative NEG /hpf    UA:UC IF INDICATED CULTURE NOT INDICATED BY UA RESULT CNI      Hyaline cast 5-10 0 - 5 /lpf   POC LACTIC ACID    Collection Time: 12/23/22  2:07 PM   Result Value Ref Range    Lactic Acid (POC) 2.89 (HH) 0.40 - 2.00 mmol/L   POC LACTIC ACID    Collection Time: 12/23/22  5:13 PM   Result Value Ref Range    Lactic Acid (POC) 2.24 (HH) 0.40 - 2.00 mmol/L   DRUG SCREEN, URINE    Collection Time: 12/23/22  7:19 PM   Result Value Ref Range    AMPHETAMINES Negative NEG      BARBITURATES Negative NEG      BENZODIAZEPINES Positive (A) NEG      COCAINE Negative NEG      METHADONE Negative NEG      OPIATES Positive (A) NEG      PCP(PHENCYCLIDINE) Negative NEG      THC (TH-CANNABINOL) Negative NEG      Drug screen comment (NOTE)    CULTURE, RESPIRATORY/SPUTUM/BRONCH W GRAM STAIN    Collection Time: 12/23/22  7:21 PM    Specimen: Sputum   Result Value Ref Range    Special Requests: NO SPECIAL REQUESTS      GRAM STAIN 2+ WBCS SEEN      GRAM STAIN RARE EPITHELIAL CELLS SEEN      GRAM STAIN FEW GRAM POSITIVE COCCI IN CLUSTERS      Culture result: PENDING    METABOLIC PANEL, COMPREHENSIVE    Collection Time: 12/24/22  1:02 AM   Result Value Ref Range    Sodium 139 136 - 145 mmol/L    Potassium 4.4 3.5 - 5.1 mmol/L    Chloride 104 97 - 108 mmol/L    CO2 30 21 - 32 mmol/L    Anion gap 5 5 - 15 mmol/L    Glucose 117 (H) 65 - 100 mg/dL    BUN 25 (H) 6 - 20 MG/DL    Creatinine 1.00 0.70 - 1.30 MG/DL    BUN/Creatinine ratio 25 (H) 12 - 20      eGFR >60 >60 ml/min/1.73m2    Calcium 8.7 8.5 - 10.1 MG/DL    Bilirubin, total 0.4 0.2 - 1.0 MG/DL    ALT (SGPT) 13 12 - 78 U/L    AST (SGOT) 5 (L) 15 - 37 U/L    Alk.  phosphatase 48 45 - 117 U/L    Protein, total 5.8 (L) 6.4 - 8.2 g/dL    Albumin 2.8 (L) 3.5 - 5.0 g/dL    Globulin 3.0 2.0 - 4.0 g/dL    A-G Ratio 0.9 (L) 1.1 - 2.2     MAGNESIUM    Collection Time: 12/24/22  1:02 AM   Result Value Ref Range    Magnesium 2.1 1.6 - 2.4 mg/dL   CBC WITH AUTOMATED DIFF    Collection Time: 12/24/22  1:02 AM   Result Value Ref Range    WBC 19.7 (H) 4.1 - 11.1 K/uL    RBC 4.09 (L) 4.10 - 5.70 M/uL    HGB 10.9 (L) 12.1 - 17.0 g/dL    HCT 34.5 (L) 36.6 - 50.3 %    MCV 84.4 80.0 - 99.0 FL    MCH 26.7 26.0 - 34.0 PG    MCHC 31.6 30.0 - 36.5 g/dL    RDW 16.0 (H) 11.5 - 14.5 %    PLATELET 323 847 - 193 K/uL    MPV 8.6 (L) 8.9 - 12.9 FL    NRBC 0.0 0  WBC    ABSOLUTE NRBC 0.00 0.00 - 0.01 K/uL    NEUTROPHILS 82 (H) 32 - 75 %    BAND NEUTROPHILS 3 %    LYMPHOCYTES 10 (L) 12 - 49 %    MONOCYTES 3 (L) 5 - 13 %    EOSINOPHILS 1 0 - 7 %    BASOPHILS 0 0 - 1 %    METAMYELOCYTES 1 %    IMMATURE GRANULOCYTES 0 0.0 - 0.5 %    ABS. NEUTROPHILS 16.7 (H) 1.8 - 8.0 K/UL    ABS. LYMPHOCYTES 2.0 0.8 - 3.5 K/UL    ABS. MONOCYTES 0.6 0.0 - 1.0 K/UL    ABS. EOSINOPHILS 0.2 0.0 - 0.4 K/UL    ABS. BASOPHILS 0.0 0.0 - 0.1 K/UL    ABS. IMM. GRANS. 0.0 0.00 - 0.04 K/UL    DF MANUAL      RBC COMMENTS ANISOCYTOSIS  1+       PROCALCITONIN    Collection Time: 12/24/22  1:02 AM   Result Value Ref Range    Procalcitonin 15.14 ng/mL       Imaging:  I have personally reviewed the patients radiographs and have reviewed the reports:  12-23-22: CT of chest: IMPRESSION     1. There is bilateral lower lobe pneumonia. The pneumonia in the left lower lobe  is similar to the examination of 12/23/2022 at 1150. The airspace disease in the  right lower lobe has actually worsened in this short time interval. This is  consistent with bilateral lower lobe pneumonia. Follow-up to resolution is  suggested. 2. There is a small left pleural effusion that is probably parapneumonic.   3. There is severe coronary artery calcification        Charli Couch MD

## 2022-12-24 NOTE — PROGRESS NOTES
Hospitalist Progress Note    NAME: Jeffrey Locke   :  1950   MRN:  [id number]       Assessment / Plan:  B/l Pna  Parapneumonic effusion  On chronic prednisone 10 mg daily  Immunocompromised   Sepsis d/t the above  IV Abx  F/up MRSA, sputum  Cx  F/up B Cx, resp panel  SLP, cleared pt for solids/thins  Urine drug screen  Trend procal  Off IVF  Pulm consulted  Prefers to leave as soon as possible, latest would be Monday morning (son driving him back to [place])     CAD on DAPT  HTN  HLD  DM  COPD, not in exacerbation  Resume home meds   Hold BP meds given low BP on admission, re assess in am  Urine drug screen + for benzo and opioids, pt on temazepam and percocet at home     Code Status: full   Surrogate Decision Maker: son   DVT Prophylaxis: lovenox  GI Prophylaxis: not indicated  Baseline: independent   Anticipated Discharge Date:  >48 hours        Subjective:     Discussed with RN events overnight. Feels better  Darker sputum  Afebrile  Less flank pain  On RA    Review of Systems:  Symptom Y/N Comments  Symptom Y/N Comments   Fever/Chills    Chest Pain     Poor Appetite    Edema     Cough    Abdominal Pain     Sputum    Joint Pain     SOB/LUNA    Pruritis/Rash     Nausea/vomit    Tolerating PT/OT     Diarrhea    Tolerating Diet     Constipation    Other       PO intake: No data found. Wt Readings from Last 10 Encounters:   22 97 kg (213 lb 13.5 oz)   20 102.5 kg (225 lb 15.5 oz)   19 90.7 kg (200 lb)   16 90.6 kg (199 lb 11.2 oz)   12/22/15 97.2 kg (214 lb 5 oz)       Objective:     VITALS:   Last 24hrs VS reviewed since prior progress note.  Most recent are:  Patient Vitals for the past 24 hrs:   Temp Pulse Resp BP SpO2   22 0920 -- 91 -- 121/61 --   22 0817 -- -- -- -- 94 %   22 0749 99.9 °F (37.7 °C) 92 18 131/67 93 %   22 0232 98 °F (36.7 °C) 86 19 133/60 94 %   12/23/22 2347 98.6 °F (37 °C) 85 12 (!) 113/55 95 %   12/23/22 2327 -- 96 18 -- 97 %   12/23/22 2248 -- -- -- -- 96 %   12/23/22 2206 -- 76 -- 106/60 --   12/23/22 2037 98.2 °F (36.8 °C) 76 16 114/64 96 %   12/23/22 1830 99 °F (37.2 °C) 82 20 118/62 96 %   12/23/22 1412 99.2 °F (37.3 °C) -- -- -- --   12/23/22 1407 -- 70 14 104/62 94 %   12/23/22 1201 -- 89 15 101/61 96 %   12/23/22 1135 -- 89 19 (!) 102/57 94 %   12/23/22 1100 100.3 °F (37.9 °C) 95 18 (!) 87/43 99 %       Intake/Output Summary (Last 24 hours) at 12/24/2022 1875  Last data filed at 12/24/2022 0505  Gross per 24 hour   Intake 2960 ml   Output 1400 ml   Net 1560 ml        I had a face to face encounter, and independently examined this patient as outlined below:    PHYSICAL EXAM:  General:    No distress     HEENT: Atraumatic, anicteric sclerae, pink conjunctivae, MMM  Neck:  Supple, symmetrical  Lungs:   CTA. No Wheezing/Rhonchi. No rales. No tenderness. No Accessory muscle use. CVS:   Regular rhythm. No murmur. No JVD   GI/:   Soft. NT. ND. BS normal  Extremities: No edema. No cyanosis. No clubbing. Skin:     Not pale. Not Jaundiced. No rashes   Psych:  Good insight. Not depressed. Not anxious or agitated. Neurologic: Alert and oriented X 4. EOMs intact. No facial asymmetry. No slurred speech. Symmetrical strength, Sensation grossly intact. Labs     I reviewed today's most current labs and imaging studies. Pertinent labs include:  Recent Labs     12/24/22  0102 12/23/22  1128   WBC 19.7* 22.8*   HGB 10.9* 11.4*   HCT 34.5* 35.3*    229     Recent Labs     12/24/22  0102 12/23/22  1128    137   K 4.4 5.2*    102   CO2 30 27   * 233*   BUN 25* 28*   CREA 1.00 1.54*   CA 8.7 8.6   MG 2.1  --    ALB 2.8* 3.0*   TBILI 0.4 0.5   ALT 13 17     CT CHEST WO CONT    Result Date: 12/23/2022  1. There is bilateral lower lobe pneumonia.  The pneumonia in the left lower lobe is similar to the examination of 12/23/2022 at 1150. The airspace disease in the right lower lobe has actually worsened in this short time interval. This is consistent with bilateral lower lobe pneumonia. Follow-up to resolution is suggested. 2. There is a small left pleural effusion that is probably parapneumonic. 3. There is severe coronary artery calcification     CT ABD PELV WO CONT    Result Date: 12/23/2022  No renal or ureteral stone or evidence of hydronephrosis. Bilateral lower lobe consolidation, left greater than right. No acute intra-abdominal abnormality. XR CHEST PORT    Result Date: 12/23/2022  1. Airspace disease at the left base with possible left pleural effusion. Recommend follow-up to ensure resolution    CT CHEST WO CONT    Result Date: 12/23/2022  1. There is bilateral lower lobe pneumonia. The pneumonia in the left lower lobe is similar to the examination of 12/23/2022 at 1150. The airspace disease in the right lower lobe has actually worsened in this short time interval. This is consistent with bilateral lower lobe pneumonia. Follow-up to resolution is suggested. 2. There is a small left pleural effusion that is probably parapneumonic. 3. There is severe coronary artery calcification     CT ABD PELV WO CONT    Result Date: 12/23/2022  No renal or ureteral stone or evidence of hydronephrosis. Bilateral lower lobe consolidation, left greater than right. No acute intra-abdominal abnormality. XR CHEST PORT    Result Date: 12/23/2022  1. Airspace disease at the left base with possible left pleural effusion. Recommend follow-up to ensure resolution   01/06/20    ECHO ADULT COMPLETE 01/07/2020 1/7/2020    Interpretation Summary  · Normal cavity size and diastolic function. Upper normal wall thickness. Low normal systolic dysfunction. Estimated left ventricular ejection fraction is 50 - 55%. · Aortic valve sclerosis with no significant stenosis.     Signed by: Marcin Lyman MD on 1/7/2020  3:07 PM     All Micro Results Procedure Component Value Units Date/Time    CULTURE, BLOOD [[id number]] Collected: 12/23/22 1128    Order Status: Sent Specimen: Blood Updated: 12/24/22 0738    CULTURE, MRSA [[id number]] Collected: 12/24/22 0102    Order Status: Sent Specimen: Nasal Updated: 12/24/22 0117    CULTURE, RESPIRATORY/SPUTUM/BRONCH Joycelyn Hacking STAIN [[id number]] Collected: 12/23/22 1921    Order Status: Completed Specimen: Sputum Updated: 12/24/22 0029     Special Requests: NO SPECIAL REQUESTS        GRAM STAIN 2+ WBCS SEEN               RARE EPITHELIAL CELLS SEEN                  FEW GRAM POSITIVE COCCI IN CLUSTERS           Culture result: PENDING    S. Allyne Runner, UR/CSF [[id number]] Collected: 12/23/22 1919    Order Status: Sent Updated: 12/23/22 1950    LEGIONELLA PNEUMOPHILA AG, URINE [[id number]] Collected: 12/23/22 1919    Order Status: Sent Specimen: Urine Updated: 12/23/22 1948    CULTURE, MRSA [[id number]]     Order Status: Canceled Specimen: Nasal     COVID-19 RAPID TEST [[id number]] Collected: 12/23/22 1200    Order Status: Completed Specimen: Nasopharyngeal Updated: 12/23/22 1308     Specimen source Nasopharyngeal        COVID-19 rapid test Not detected        Comment: Rapid Abbott ID Now       Rapid NAAT:  The specimen is NEGATIVE for SARS-CoV-2, the novel coronavirus associated with COVID-19. Negative results should be treated as presumptive and, if inconsistent with clinical signs and symptoms or necessary for patient management, should be tested with an alternative molecular assay. Negative results do not preclude SARS-CoV-2 infection and should not be used as the sole basis for patient management decisions. This test has been authorized by the FDA under an Emergency Use Authorization (EUA) for use by authorized laboratories.    Fact sheet for Healthcare Providers:  http://www.allen-morales.brian/  Fact sheet for Patients: http://www.allen-morales.biz/       Methodology: Isothermal Nucleic Acid Amplification         CULTURE, BLOOD [[id number]] Collected: 12/23/22 1128    Order Status: Sent Specimen: Blood Updated: 12/23/22 1205              Current Medications:     Current Facility-Administered Medications:     tamsulosin (FLOMAX) capsule 0.4 mg, 0.4 mg, Oral, DAILY, Patricio Pop MD, 0.4 mg at 12/24/22 0920    clopidogreL (PLAVIX) tablet 75 mg, 75 mg, Oral, DAILY, Patricio Pop MD, 75 mg at 12/24/22 0920    0.9% sodium chloride infusion, 125 mL/hr, IntraVENous, CONTINUOUS, Laurita Pop MD, Last Rate: 125 mL/hr at 12/23/22 1713, 125 mL/hr at 12/23/22 1713    . PHARMACY TO SUBSTITUTE PER PROTOCOL (Reordered from: apremilast 30 mg tab), , , Per Protocol, Laurita Pop MD    aspirin delayed-release tablet 81 mg, 81 mg, Oral, DAILY, Laurita Pop MD, 81 mg at 12/24/22 8517    atorvastatin (LIPITOR) tablet 40 mg, 40 mg, Oral, DAILY, Laurita Pop MD, 40 mg at 12/24/22 4015    arformoterol 15 mcg/budesonide 0.5 mg neb solution, , Nebulization, BID RT, Laurita Pop MD, Given at 12/24/22 0815    cyclobenzaprine (FLEXERIL) tablet 10 mg, 10 mg, Oral, TID PRN, Laurita Pop MD    metoprolol tartrate (LOPRESSOR) tablet 25 mg, 25 mg, Oral, Q12H, Patricio Pop MD, 25 mg at 12/24/22 0920    temazepam (RESTORIL) capsule 30 mg, 30 mg, Oral, QHS PRN, Laurita Pop MD    sodium chloride (NS) flush 5-40 mL, 5-40 mL, IntraVENous, Q8H, Laurita Pop MD, 10 mL at 12/24/22 0610    sodium chloride (NS) flush 5-40 mL, 5-40 mL, IntraVENous, PRN, Laurita Pop MD    acetaminophen (TYLENOL) tablet 650 mg, 650 mg, Oral, Q6H PRN, 650 mg at 12/24/22 6129 **OR** acetaminophen (TYLENOL) suppository 650 mg, 650 mg, Rectal, Q6H PRN, Lauirta Pop MD    polyethylene glycol (MIRALAX) packet 17 g, 17 g, Oral, DAILY PRN, Laurita Pop MD    ondansetron (ZOFRAN ODT) tablet 4 mg, 4 mg, Oral, Q8H PRN **OR** ondansetron (ZOFRAN) injection 4 mg, 4 mg, IntraVENous, Q6H PRN, Tracee Pop MD    enoxaparin (LOVENOX) injection 40 mg, 40 mg, SubCUTAneous, DAILY, Tracee Pop MD, 40 mg at 12/24/22 5652    doxycycline (VIBRAMYCIN) 100 mg in 0.9% sodium chloride (MBP/ADV) 100 mL MBP, 100 mg, IntraVENous, Q12H, Tracee Pop MD, Last Rate: 100 mL/hr at 12/24/22 0920, 100 mg at 12/24/22 0920    cefTRIAXone (ROCEPHIN) 1 g in 0.9% sodium chloride (MBP/ADV) 50 mL MBP, 1 g, IntraVENous, Q24H, Patricio Pop MD    albuterol-ipratropium (DUO-NEB) 2.5 MG-0.5 MG/3 ML, 3 mL, Nebulization, Q4H PRN, Tracee Pop MD, 3 mL at 12/24/22 0818    **Can pt supply own Apremilast?  Please send message to RX to ID and Label**, 1 Each, Other, DAILY, Tracee Pop MD    balsam peru-castor oiL (VENELEX) ointment, , Topical, BID, Tracee Pop MD     Procedures: see electronic medical records for all procedures/Xrays and details which were not copied into this note but were reviewed prior to creation of Plan. Reviewed most current lab test results and cultures  YES  Reviewed most current radiology test results   YES  Review and summation of old records today    NO  Reviewed patient's current orders and MAR    YES  PMH/SH reviewed - no change compared to H&P  ________________________________________________________________________  Care Plan discussed with:    Comments   Patient x    Family      RN     Care Manager     Consultant                        Multidiciplinary team rounds were held today with , nursing, pharmacist and clinical coordinator. Patient's plan of care was discussed; medications were reviewed and discharge planning was addressed.      ________________________________________________________________________  Total NON critical care TIME:   30  Minutes    Total CRITICAL CARE TIME Spent:   Minutes non procedure based      Comments   >50% of visit spent in counseling and coordination of care x This includes time during multidisciplinary rounds if indicated above   ________________________________________________________________________  Davion Aguilar MD

## 2022-12-24 NOTE — PROGRESS NOTES
SPEECH PATHOLOGY BEDSIDE SWALLOW EVALUATION/DISCHARGE  Patient: Charles Adair (15 y.o. male)  Date: 12/24/2022  Primary Diagnosis: Sepsis ([facility] [address] [A41.9]       Precautions:        ASSESSMENT :  Based on the objective data described below, the patient presents with no oral/pharyngeal dysphagia, and no overt s/s aspiration observed. Patient and RN both denied concern for dysphagia. Note patient admitted with pneumonia and CT Chest showed There is bilateral lower lobe pneumonia. The pneumonia in the left lower lobe is similar to the examination of 12/23/2022 at 1150. The airspace disease in the right lower lobe has actually worsened in this short time interval. This is consistent with bilateral lower lobe pneumonia. Skilled acute therapy provided by a speech-language pathologist is not indicated at this time. PLAN :  Recommendations:  -Regular/thin liquid diet  Discharge Recommendations: None     SUBJECTIVE:   Patient stated I'm aggravated.     OBJECTIVE:     Past Medical History:   Diagnosis Date    Asthma     CAD (coronary artery disease) 1/6/2020    Oct 2019 PCI/PINO LAD in [facility])     Hypertension     Ill-defined condition     lumbar fusion, cervical fusion    S/P cardiac cath 1/7/2020 1/7/2020 PCI/PINO to ostial LM/LAD     Past Surgical History:   Procedure Laterality Date    HX ORTHOPAEDIC      Lumbar and cervical fusion.     HX OTHER SURGICAL      Morphine pump placed in back     Prior Level of Function/Home Situation:   Home Situation  Home Environment: Private residence  One/Two Story Residence: One story  Living Alone: No  Support Systems: Other Family Member(s)  Patient Expects to be Discharged to[de-identified] Home  Current DME Used/Available at Home: Cane, straight  Diet prior to admission: regular/thin  Current Diet:  regular/thin   Cognitive and Communication Status:  Neurologic State: Alert, Appropriate for age  Orientation Level: Oriented X4  Cognition: Follows commands           Oral Assessment:  Oral Assessment  Dentition: Natural;Limited  Oral Hygiene: moist  P.O. Trials:  Patient Position: up in bed  Vocal quality prior to P.O.: No impairment  Consistency Presented: Thin liquid; Solid  How Presented: Self-fed/presented;Straw;Successive swallows     Bolus Acceptance: No impairment  Bolus Formation/Control: No impairment (WFL for dentition)     Propulsion: No impairment  Oral Residue: None        Aspiration Signs/Symptoms: None  Pharyngeal Phase Characteristics: No impairment, issues, or problems              Oral Phase Severity: No impairment  Pharyngeal Phase Severity : No impairment  NOMS:   The NOMS functional outcome measure was used to quantify this patient's level of swallowing impairment. Based on the NOMS, the patient was determined to be at level 7 for swallow function     NOMS Swallowing Levels:  Level 1 (CN): NPO  Level 2 (CM): NPO but takes consistency in therapy  Level 3 (CL): Takes less than 50% of nutrition p.o. and continues with nonoral feedings; and/or safe with mod cues; and/or max diet restriction  Level 4 (CK): Safe swallow but needs mod cues; and/or mod diet restriction; and/or still requires some nonoral feeding/supplements  Level 5 (CJ): Safe swallow with min diet restriction; and/or needs min cues  Level 6 (CI): Independent with p.o.; rare cues; usually self cues; may need to avoid some foods or needs extra time  Level 7 ([address] [place] [address]): Independent for all p.o.  MACKENZIE. (2003). National Outcomes Measurement System (NOMS): Adult Speech-Language Pathology User's Guide. Pain:  Pain Scale 1: Numeric (0 - 10)  Pain Intensity 1: 0     After treatment:   Patient left in no apparent distress in bed, Call bell within reach, and Nursing notified    COMMUNICATION/EDUCATION:   Patient was educated regarding his deficit(s) of WFL swallow as this relates to his diagnosis. He demonstrated Good understanding as evidenced by verbalizing understanding.     The patient's plan of care including recommendations, planned interventions, and recommended diet changes were discussed with: Registered nurse.      Thank you for this referral.  DREW Husain  Time Calculation: 10 mins

## 2022-12-24 NOTE — PROGRESS NOTES
End of Shift Note    Bedside shift change report given to [facility] RN (oncoming nurse) by Kameron Rivera RN (offgoing nurse). Report included the following information SBAR, Kardex, Intake/Output, MAR, Recent Results, and Cardiac Rhythm sinus rhythm    Shift worked:  7185-1803     Shift summary and any significant changes:    1112: Patient hypotensive; bp 93/55, 83/51, 88/54; not symptomatic. MD notified. No new orders received. Patient got tylenol twice this shift for headache   Concerns for physician to address:       Zone phone for oncoming shift:          Activity:  Activity Level: Up with Assistance  Number times ambulated in hallways past shift: 0  Number of times OOB to chair past shift: 0    Cardiac:   Cardiac Monitoring: Yes      Cardiac Rhythm: Sinus Rhythm    Access:  Current line(s): PIV     Genitourinary:   Urinary status: voiding    Respiratory:   O2 Device: None (Room air)  Chronic home O2 use?: NO  Incentive spirometer at bedside: NO       GI:     Current diet:  ADULT DIET Regular; 4 carb choices (60 gm/meal); Low Fat/Low Chol/High Fiber/2 gm Na;  Low Sodium (2 gm)  Passing flatus: YES  Tolerating current diet: YES       Pain Management:   Patient states pain is manageable on current regimen: YES    Skin:  Sreekanth Score: 21  Interventions: increase time out of bed, foam dressing, PT/OT consult, and internal/external urinary devices    Patient Safety:  Fall Score:    Interventions: bed/chair alarm, assistive device (walker, cane, etc), gripper socks, and pt to call before getting OOB       Length of Stay:  Expected LOS: - - -  Actual LOS: 1      Kameron Rivera RN

## 2022-12-24 NOTE — PROGRESS NOTES
End of Shift Note    Bedside shift change report given to Moo Mendez RN (oncoming nurse) by Luis Caldera RN (offgoing nurse). Report included the following information SBAR    Shift worked:  7p-7a     Shift summary and any significant changes:    Held nighttime metoprolol due to bp holding parameters. Hold <[id number]    MRSA swab sent         Concerns for physician to address:       Zone phone for oncoming shift:          Activity:  Activity Level: Up with Assistance  Number times ambulated in hallways past shift: 0  Number of times OOB to chair past shift: 0    Cardiac:   Cardiac Monitoring: Yes      Cardiac Rhythm: Sinus Rhythm    Access:  Current line(s): PIV     Genitourinary:   Urinary status: voiding    Respiratory:   O2 Device: None (Room air)  Chronic home O2 use?: NO  Incentive spirometer at bedside: NO       GI:     Current diet:  ADULT DIET Regular; 4 carb choices (60 gm/meal); Low Fat/Low Chol/High Fiber/2 gm Na;  Low Sodium (2 gm)  Passing flatus: YES  Tolerating current diet: YES       Pain Management:   Patient states pain is manageable on current regimen: YES    Skin:  Sreekanth Score: 22  Interventions: float heels and increase time out of bed    Patient Safety:  Fall Score:    Interventions: bed/chair alarm, assistive device (walker, cane, etc), gripper socks, pt to call before getting OOB, and stay with me (per policy)       Length of Stay:  Expected LOS: - - -  Actual LOS: 1      Luis Caldera RN

## 2022-12-24 NOTE — PROGRESS NOTES
Problem: Falls - Risk of  Goal: *Absence of Falls  Description: Document Vic Nguyen Fall Risk and appropriate interventions in the flowsheet. Outcome: Progressing Towards Goal  Note: Fall Risk Interventions:                                Problem: Patient Education: Go to Patient Education Activity  Goal: Patient/Family Education  Outcome: Progressing Towards Goal     Problem: Pressure Injury - Risk of  Goal: *Prevention of pressure injury  Description: Document Sreekanth Scale and appropriate interventions in the flowsheet.   Outcome: Progressing Towards Goal  Note: Pressure Injury Interventions:  Sensory Interventions: Assess changes in LOC, Chair cushion, Check visual cues for pain, Discuss PT/OT consult with provider, Float heels, Keep linens dry and wrinkle-free, Maintain/enhance activity level, Minimize linen layers, Monitor skin under medical devices         Activity Interventions: Pressure redistribution bed/mattress(bed type), Increase time out of bed, Chair cushion    Mobility Interventions: Chair cushion, PT/OT evaluation    Nutrition Interventions: Document food/fluid/supplement intake, Discuss nutritional consult with provider    Friction and Shear Interventions: Apply protective barrier, creams and emollients, Lift sheet, Lift team/patient mobility team, Minimize layers                Problem: Patient Education: Go to Patient Education Activity  Goal: Patient/Family Education  Outcome: Progressing Towards Goal     Problem: Pneumonia: Day 1  Goal: Off Pathway (Use only if patient is Off Pathway)  Outcome: Progressing Towards Goal  Goal: Activity/Safety  Outcome: Progressing Towards Goal  Goal: Consults, if ordered  Outcome: Progressing Towards Goal  Goal: Diagnostic Test/Procedures  Outcome: Progressing Towards Goal  Goal: Nutrition/Diet  Outcome: Progressing Towards Goal  Goal: Medications  Outcome: Progressing Towards Goal  Goal: Respiratory  Outcome: Progressing Towards Goal  Goal: Treatments/Interventions/Procedures  Outcome: Progressing Towards Goal  Goal: Psychosocial  Outcome: Progressing Towards Goal  Goal: *Oxygen saturation within defined limits  Outcome: Progressing Towards Goal  Goal: *Influenza vaccine administered (October-March)  Outcome: Progressing Towards Goal  Goal: *Pneumoccocal vaccine administered  Outcome: Progressing Towards Goal  Goal: *Hemodynamically stable  Outcome: Progressing Towards Goal  Goal: *Demonstrates progressive activity  Outcome: Progressing Towards Goal  Goal: *Tolerating diet  Outcome: Progressing Towards Goal

## 2022-12-24 NOTE — PROGRESS NOTES
Problem: Falls - Risk of  Goal: *Absence of Falls  Description: Document Nadja Abernathy Fall Risk and appropriate interventions in the flowsheet. Outcome: Progressing Towards Goal  Note: Fall Risk Interventions:                                Problem: Patient Education: Go to Patient Education Activity  Goal: Patient/Family Education  Outcome: Progressing Towards Goal     Problem: Pressure Injury - Risk of  Goal: *Prevention of pressure injury  Description: Document Sreekanth Scale and appropriate interventions in the flowsheet.   Outcome: Progressing Towards Goal  Note: Pressure Injury Interventions:  Sensory Interventions: Assess changes in LOC, Chair cushion, Check visual cues for pain, Discuss PT/OT consult with provider, Float heels, Keep linens dry and wrinkle-free, Maintain/enhance activity level, Minimize linen layers, Monitor skin under medical devices         Activity Interventions: Pressure redistribution bed/mattress(bed type), Increase time out of bed, Chair cushion    Mobility Interventions: Chair cushion, PT/OT evaluation    Nutrition Interventions: Document food/fluid/supplement intake, Discuss nutritional consult with provider    Friction and Shear Interventions: Apply protective barrier, creams and emollients, Lift sheet, Lift team/patient mobility team, Minimize layers                Problem: Patient Education: Go to Patient Education Activity  Goal: Patient/Family Education  Outcome: Progressing Towards Goal     Problem: Patient Education: Go to Patient Education Activity  Goal: Patient/Family Education  Outcome: Progressing Towards Goal

## 2022-12-25 ENCOUNTER — APPOINTMENT (OUTPATIENT)
Dept: GENERAL RADIOLOGY | Age: 72
DRG: 871 | End: 2022-12-25
Attending: GENERAL ACUTE CARE HOSPITAL
Payer: MEDICARE

## 2022-12-25 VITALS
DIASTOLIC BLOOD PRESSURE: 80 MMHG | WEIGHT: 225.09 LBS | TEMPERATURE: 99.2 F | HEART RATE: 81 BPM | HEIGHT: 73 IN | RESPIRATION RATE: 13 BRPM | SYSTOLIC BLOOD PRESSURE: 153 MMHG | OXYGEN SATURATION: 95 % | BODY MASS INDEX: 29.83 KG/M2

## 2022-12-25 LAB
BACTERIA SPEC CULT: NORMAL
BASOPHILS # BLD: 0 K/UL (ref 0–0.1)
BASOPHILS NFR BLD: 0 % (ref 0–1)
DIFFERENTIAL METHOD BLD: ABNORMAL
EOSINOPHIL # BLD: 0.4 K/UL (ref 0–0.4)
EOSINOPHIL NFR BLD: 2 % (ref 0–7)
ERYTHROCYTE [DISTWIDTH] IN BLOOD BY AUTOMATED COUNT: 16 % (ref 11.5–14.5)
GRAM STN SPEC: NORMAL
HCT VFR BLD AUTO: 37.3 % (ref 36.6–50.3)
HGB BLD-MCNC: 11.6 G/DL (ref 12.1–17)
IMM GRANULOCYTES # BLD AUTO: 0 K/UL (ref 0–0.04)
IMM GRANULOCYTES NFR BLD AUTO: 0 % (ref 0–0.5)
LYMPHOCYTES # BLD: 1.8 K/UL (ref 0.8–3.5)
LYMPHOCYTES NFR BLD: 10 % (ref 12–49)
MCH RBC QN AUTO: 26.7 PG (ref 26–34)
MCHC RBC AUTO-ENTMCNC: 31.1 G/DL (ref 30–36.5)
MCV RBC AUTO: 85.7 FL (ref 80–99)
MONOCYTES # BLD: 0.5 K/UL (ref 0–1)
MONOCYTES NFR BLD: 3 % (ref 5–13)
NEUTS SEG # BLD: 15.4 K/UL (ref 1.8–8)
NEUTS SEG NFR BLD: 85 % (ref 32–75)
NRBC # BLD: 0 K/UL (ref 0–0.01)
NRBC BLD-RTO: 0 PER 100 WBC
PLATELET # BLD AUTO: 189 K/UL (ref 150–400)
PMV BLD AUTO: 8.9 FL (ref 8.9–12.9)
RBC # BLD AUTO: 4.35 M/UL (ref 4.1–5.7)
RBC MORPH BLD: ABNORMAL
SERVICE CMNT-IMP: NORMAL
SERVICE CMNT-IMP: NORMAL
WBC # BLD AUTO: 18.1 K/UL (ref 4.1–11.1)

## 2022-12-25 PROCEDURE — 74011250636 HC RX REV CODE- 250/636: Performed by: GENERAL ACUTE CARE HOSPITAL

## 2022-12-25 PROCEDURE — 36415 COLL VENOUS BLD VENIPUNCTURE: CPT

## 2022-12-25 PROCEDURE — 74011636637 HC RX REV CODE- 636/637: Performed by: GENERAL ACUTE CARE HOSPITAL

## 2022-12-25 PROCEDURE — 77010033678 HC OXYGEN DAILY

## 2022-12-25 PROCEDURE — 74011250637 HC RX REV CODE- 250/637: Performed by: GENERAL ACUTE CARE HOSPITAL

## 2022-12-25 PROCEDURE — 85025 COMPLETE CBC W/AUTO DIFF WBC: CPT

## 2022-12-25 PROCEDURE — 74011000258 HC RX REV CODE- 258: Performed by: GENERAL ACUTE CARE HOSPITAL

## 2022-12-25 PROCEDURE — 74011250637 HC RX REV CODE- 250/637: Performed by: PHYSICIAN ASSISTANT

## 2022-12-25 PROCEDURE — 74011000250 HC RX REV CODE- 250: Performed by: GENERAL ACUTE CARE HOSPITAL

## 2022-12-25 PROCEDURE — 71045 X-RAY EXAM CHEST 1 VIEW: CPT

## 2022-12-25 RX ORDER — PREDNISONE 10 MG/1
10 TABLET ORAL DAILY
Qty: 30 TABLET | Refills: 0 | Status: SHIPPED
Start: 2022-12-25

## 2022-12-25 RX ORDER — DOXYCYCLINE 100 MG/1
100 CAPSULE ORAL 2 TIMES DAILY
Qty: 14 CAPSULE | Refills: 0 | Status: SHIPPED | OUTPATIENT
Start: 2022-12-25 | End: 2023-01-01

## 2022-12-25 RX ORDER — CEFUROXIME AXETIL 500 MG/1
500 TABLET ORAL 2 TIMES DAILY
Qty: 14 TABLET | Refills: 0 | Status: SHIPPED | OUTPATIENT
Start: 2022-12-25 | End: 2023-01-01

## 2022-12-25 RX ADMIN — SODIUM CHLORIDE, PRESERVATIVE FREE 10 ML: 5 INJECTION INTRAVENOUS at 06:29

## 2022-12-25 RX ADMIN — PREDNISONE 10 MG: 10 TABLET ORAL at 09:14

## 2022-12-25 RX ADMIN — CLOPIDOGREL BISULFATE 75 MG: 75 TABLET ORAL at 09:14

## 2022-12-25 RX ADMIN — ACETAMINOPHEN 650 MG: 325 TABLET ORAL at 11:20

## 2022-12-25 RX ADMIN — MIDODRINE HYDROCHLORIDE 5 MG: 5 TABLET ORAL at 09:14

## 2022-12-25 RX ADMIN — ASPIRIN 81 MG: 81 TABLET, COATED ORAL at 09:14

## 2022-12-25 RX ADMIN — DOXYCYCLINE 100 MG: 100 INJECTION, POWDER, LYOPHILIZED, FOR SOLUTION INTRAVENOUS at 09:15

## 2022-12-25 RX ADMIN — SODIUM CHLORIDE 1 G: 900 INJECTION INTRAVENOUS at 11:20

## 2022-12-25 RX ADMIN — TAMSULOSIN HYDROCHLORIDE 0.4 MG: 0.4 CAPSULE ORAL at 09:14

## 2022-12-25 RX ADMIN — ENOXAPARIN SODIUM 40 MG: 100 INJECTION SUBCUTANEOUS at 09:14

## 2022-12-25 RX ADMIN — ATORVASTATIN CALCIUM 40 MG: 40 TABLET, FILM COATED ORAL at 09:14

## 2022-12-25 NOTE — DISCHARGE SUMMARY
Hospitalist Discharge Summary     Patient ID:  Layla Morales  [id number]  81 y.o.  1950  12/23/2022    PCP on record: None    Admit date: 12/23/2022  Discharge date and time: 12/25/2022    DISCHARGE DIAGNOSIS:  As below     CONSULTATIONS:  IP CONSULT TO HOSPITALIST  IP CONSULT TO PULMONOLOGY    Excerpted HPI from H&P of Juli Lowry MD:  Wendy Baldwin is a 81 y.o.  male who presents with the above CC. Pt started feeling sick 3-4 days ago, got much worse yesterday, started with cough, clear sputum, SOB, low grade fever, left flank/mid back pain. No sick contact. No CP, leg swelling/pain. No N/V/D/abd pain. No drug abuse, etoh abuse. No trouble swallowing. BP was low in ED, given 30 ml/kg IVF as alex sepsis protocol     CT CHEST WO CONT     Result Date: 12/23/2022  1. There is bilateral lower lobe pneumonia. The pneumonia in the left lower lobe is similar to the examination of 12/23/2022 at 1150. The airspace disease in the right lower lobe has actually worsened in this short time interval. This is consistent with bilateral lower lobe pneumonia. Follow-up to resolution is suggested. 2. There is a small left pleural effusion that is probably parapneumonic. 3. There is severe coronary artery calcification      CT ABD PELV WO CONT     Result Date: 12/23/2022  No renal or ureteral stone or evidence of hydronephrosis. Bilateral lower lobe consolidation, left greater than right. No acute intra-abdominal abnormality. XR CHEST PORT     Result Date: 12/23/2022  1. Airspace disease at the left base with possible left pleural effusion. Recommend follow-up to ensure resolution    We were asked to admit for work up and evaluation of the above problems  ____________________________________________________________________  DISCHARGE SUMMARY/HOSPITAL COURSE:  for full details see H&P, daily progress notes, labs, consult notes.      B/l Pna  Parapneumonic effusion  On chronic prednisone 10 mg daily  Immunocompromised   Sepsis d/t the above  IV Abx, switch to doxy/ceftin  MRSA, sputum  Cx neg  B Cx, resp panel neg  CXR stable, need repeat in 2 weeks    Needs to f/up with PCP and primary pulmonologist in [place]  SLP, cleared pt for solids/thins  WBC and  procal trending down  Off IVF  DC on RA  Pulm consulted    CAD on DAPT  HTN  HLD  DM  COPD, not in exacerbation  Resume home meds   Hold BP meds given low BP on admission, re assess in am  Urine drug screen + for benzo and opioids, pt on temazepam and percocet at home       All Micro Results       Procedure Component Value Units Date/Time    CULTURE, RESPIRATORY/SPUTUM/BRONCH Johnathon Grams [[id number]] Collected: 12/23/22 1921    Order Status: Completed Specimen: Sputum Updated: 12/25/22 0930     Special Requests: NO SPECIAL REQUESTS        GRAM STAIN 2+ WBCS SEEN               RARE EPITHELIAL CELLS SEEN                  FEW GRAM POSITIVE COCCI IN CLUSTERS           Culture result:       LIGHT NORMAL RESPIRATORY WINSTON          CULTURE, MRSA [[id number]] Collected: 12/24/22 0102    Order Status: Sent Specimen: Nasal Updated: 12/24/22 1048    CULTURE, BLOOD [[id number]] Collected: 12/23/22 1128    Order Status: Sent Specimen: Blood Updated: 12/24/22 0738    S. Elnita Francis, UR/CSF [[id number]] Collected: 12/23/22 1919    Order Status: Sent Updated: 12/23/22 1950    LEGIONELLA PNEUMOPHILA AG, URINE [[id number]] Collected: 12/23/22 1919    Order Status: Sent Specimen: Urine Updated: 12/23/22 1948    CULTURE, MRSA [[id number]]     Order Status: Canceled Specimen: Nasal     COVID-19 RAPID TEST [[id number]] Collected: 12/23/22 1200    Order Status: Completed Specimen: Nasopharyngeal Updated: 12/23/22 1308     Specimen source Nasopharyngeal        COVID-19 rapid test Not detected        Comment: Rapid Abbott ID Now       Rapid NAAT:  The specimen is NEGATIVE for SARS-CoV-2, the novel coronavirus associated with COVID-19.        Negative results should be treated as presumptive and, if inconsistent with clinical signs and symptoms or necessary for patient management, should be tested with an alternative molecular assay. Negative results do not preclude SARS-CoV-2 infection and should not be used as the sole basis for patient management decisions. This test has been authorized by the FDA under an Emergency Use Authorization (EUA) for use by authorized laboratories. Fact sheet for Healthcare Providers:  http://www.tr.brian/  Fact sheet for Patients: http://www.tr.brian/       Methodology: Isothermal Nucleic Acid Amplification         CULTURE, BLOOD [[id number]] Collected: 12/23/22 1128    Order Status: Sent Specimen: Blood Updated: 12/23/22 1205           01/06/20    ECHO ADULT COMPLETE 01/07/2020 1/7/2020    Interpretation Summary  · Normal cavity size and diastolic function. Upper normal wall thickness. Low normal systolic dysfunction. Estimated left ventricular ejection fraction is 50 - 55%. · Aortic valve sclerosis with no significant stenosis. Signed by: Samuel Bone MD on 1/7/2020  3:07 PM    _______________________________________________________________________  Patient seen and examined by me on discharge day. POC d/w pt, all question/concerns addressed and pt verbalized understanding. Pertinent Findings:  Gen:    Not in distress  Chest: Clear lungs  CVS:   Regular rhythm. No edema  Abd/: Soft, not distended, not tender  Neuro:  Alert, oriented   _______________________________________________________________________  DISCHARGE MEDICATIONS:   Current Discharge Medication List        START taking these medications    Details   cefUROXime (CEFTIN) 500 mg tablet Take 1 Tablet by mouth two (2) times a day for 7 days. Qty: 14 Tablet, Refills: 0  Start date: 12/25/2022, End date: 1/1/2023      doxycycline (MONODOX) 100 mg capsule Take 1 Capsule by mouth two (2) times a day for 7 days.   Qty: 14 Capsule, Refills: 0  Start date: 12/25/2022, End date: 1/1/2023           CONTINUE these medications which have CHANGED    Details   predniSONE (DELTASONE) 10 mg tablet Take 10 mg by mouth daily. 40mg two more days, then 20mg for 3 days then 10mg  Qty: 30 Tablet, Refills: 0  Start date: 12/25/2022           CONTINUE these medications which have NOT CHANGED    Details   dilTIAZem ER (DILACOR XR) 180 mg capsule Take 180 mg by mouth daily. Indications: ventricular rate control in atrial fibrillation      clopidogreL (Plavix) 75 mg tab Take 75 mg by mouth daily. tamsulosin (Flomax) 0.4 mg capsule Take 0.4 mg by mouth daily. aspirin delayed-release 81 mg tablet Take 1 Tab by mouth daily. Qty: 30 Tab, Refills: 11      metoprolol tartrate (LOPRESSOR) 25 mg tablet Take 1 Tab by mouth every twelve (12) hours. Qty: 60 Tab, Refills: 11      metFORMIN (GLUCOPHAGE) 500 mg tablet Take 1 Tab by mouth two (2) times daily (with meals). Please restart Metformin on 1/9/2020  Qty: 60 Tab, Refills: 11      vit C/E/Zn/coppr/lutein/zeaxan (PRESERVISION AREDS-2 PO) Take 1 Tab by mouth two (2) times a day. sub-q infusion pump (SUBCUTANEOUS INFUSION PUMP) by Does Not Apply route. Dilaudid Concentration: 20 mg/ml  Daily dose: 7.134 mg/day  Pump size: 20 cc  Last fill: 12/20/19  Next Fill: 1/31/20      temazepam (RESTORIL) 30 mg capsule Take 30 mg by mouth nightly as needed for Sleep. cyclobenzaprine (FLEXERIL) 10 mg tablet Take 10 mg by mouth three (3) times daily as needed for Muscle Spasm(s). enalapril (VASOTEC) 10 mg tablet Take 10 mg by mouth two (2) times a day. oxyCODONE-acetaminophen (PERCOCET 10)  mg per tablet Take 1 Tab by mouth every four (4) hours as needed for Pain. Indications: PAIN      budesonide-formoterol (SYMBICORT) 160-4.5 mcg/actuation HFA inhaler Take 2 Puffs by inhalation daily. atorvastatin (LIPITOR) 40 mg tablet Take 40 mg by mouth daily.       apremilast 30 mg tab Take 30 mg by mouth two (2) times a day. 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           STOP taking these medications       ticagrelor (BRILINTA) 90 mg tablet Comments:   Reason for Stopping:                 Patient Follow Up Instructions: Activity: Activity as tolerated  Diet: ADULT DIET Regular; 4 carb choices (60 gm/meal); Low Fat/Low Chol/High Fiber/2 gm Na; Low Sodium (2 gm)  Wound Care: None needed  Follow-up with PCP in  1 week. Follow-up tests/labs none   If you have any concerns that you feel you need to come back to the hospital, please do not hesitate.     Follow-up Information       Follow up With Specialties Details Why Contact Info    None    None ([phone]) Patient stated that they have no PCP            ________________________________________________________________    Risk of deterioration: Low    Condition at Discharge:  Stable  __________________________________________________________________    Disposition  Home with family, no needs    ____________________________________________________________________    Code Status: Full Code  ___________________________________________________________________      Total time in minutes spent coordinating this discharge (includes going over instructions, follow-up, prescriptions, and preparing report for sign off to her PCP) :  >30 minutes    Signed:  Asia Frye MD

## 2022-12-25 NOTE — PROGRESS NOTES
Problem: Falls - Risk of  Goal: *Absence of Falls  Description: Document Chelsey Ramachandran Fall Risk and appropriate interventions in the flowsheet. Outcome: Progressing Towards Goal  Note: Fall Risk Interventions:                                Problem: Patient Education: Go to Patient Education Activity  Goal: Patient/Family Education  Outcome: Progressing Towards Goal     Problem: Pressure Injury - Risk of  Goal: *Prevention of pressure injury  Description: Document Sreekanth Scale and appropriate interventions in the flowsheet.   Outcome: Progressing Towards Goal  Note: Pressure Injury Interventions:  Sensory Interventions: Assess changes in LOC, Assess need for specialty bed, Avoid rigorous massage over bony prominences, Discuss PT/OT consult with provider         Activity Interventions: Assess need for specialty bed, Chair cushion, PT/OT evaluation    Mobility Interventions: Assess need for specialty bed, Chair cushion, HOB 30 degrees or less, Pressure redistribution bed/mattress (bed type)    Nutrition Interventions: Document food/fluid/supplement intake    Friction and Shear Interventions: Apply protective barrier, creams and emollients, Feet elevated on foot rest, HOB 30 degrees or less, Lift sheet, Lift team/patient mobility team                Problem: Patient Education: Go to Patient Education Activity  Goal: Patient/Family Education  Outcome: Progressing Towards Goal     Problem: Patient Education: Go to Patient Education Activity  Goal: Patient/Family Education  Outcome: Progressing Towards Goal

## 2022-12-25 NOTE — CONSULTS
Pulmonary, Critical Care, and Sleep Medicine    Patient Consult    Name: Fern Jean Baptiste MRN: [id number]   : 1950 Hospital: [facility]   Date: 2022          IMPRESSION:   Bilateral Lower lobe Pneumonia, was noted to be worse  on CT scan imaging. Has been stable on imaging from . Small left parapneumonic effusion  Hx of COPD, last exacerbation about 3 months ago. Nocturnal hypoxia. 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 Remote ex smoker  Visiting from [place]. RECOMMENDATIONS:   AGree with Ctx and Doxycycline, can be changed to po dox to completed a 10 day course. He will need follow up in [place] with his PCP and establish a lung doc in [place]. He will need repeat CXR imaging within 2 weeks to establish improvement. Will need assessment for his COPD when discharged. DVT prophylaxis  Can assess with PT and OT. If stable with Pt and OT can be discharged from my standpoint. Subjective:   He feels pretty good was on RA when seen this am.   NO chest pain, no back pain, Tolerating po intake well. Not coughing up much sputum. This patient has been seen and evaluated at the request of Dr. Michael Francisco for above. Patient is a 81 y.o. male   Who is visiting from [place]. He has been feeling progressively worse for the last 1 month. Has been on WILMA that he uses several times per day. He his on chronic prednisone 10mg po every day. Has been with worsening sinus issues. Denies any GERD or aspiration symptoms. His last Abx was amoxicillin about 3 months ago.      Past Medical History:   Diagnosis Date    Asthma     CAD (coronary artery disease) 2020    Oct 2019 PCI/PINO LAD in OSH    Diabetes [facility])     Hypertension     Ill-defined condition     lumbar fusion, cervical fusion    S/P cardiac cath 2020 PCI/PINO to ostial LM/LAD      Past Surgical History:   Procedure Laterality Date    HX ORTHOPAEDIC      Lumbar and cervical fusion. HX OTHER SURGICAL      Morphine pump placed in back      Prior to Admission medications    Medication Sig Start Date End Date Taking? Authorizing Provider   dilTIAZem ER (DILACOR XR) 180 mg capsule Take 180 mg by mouth daily. Indications: ventricular rate control in atrial fibrillation   Yes Provider, Historical   clopidogreL (Plavix) 75 mg tab Take 75 mg by mouth daily. Yes Provider, Historical   tamsulosin (Flomax) 0.4 mg capsule Take 0.4 mg by mouth daily. Yes Provider, Historical   aspirin delayed-release 81 mg tablet Take 1 Tab by mouth daily. 1/8/20  Yes Lian Mckeon NP   metoprolol tartrate (LOPRESSOR) 25 mg tablet Take 1 Tab by mouth every twelve (12) hours. 1/8/20  Yes Lian Mckeon NP   predniSONE (DELTASONE) 10 mg tablet 40mg two more days, then 20mg for 3 days then 10mg 1/8/20  Yes Israel Leal MD   metFORMIN (GLUCOPHAGE) 500 mg tablet Take 1 Tab by mouth two (2) times daily (with meals). Please restart Metformin on 1/9/2020 1/8/20  Yes Key Ring NP   vit C/E/Zn/coppr/lutein/zeaxan (PRESERVISION AREDS-2 PO) Take 1 Tab by mouth two (2) times a day. Yes Provider, Historical   sub-q infusion pump (SUBCUTANEOUS INFUSION PUMP) by Does Not Apply route. Dilaudid Concentration: 20 mg/ml  Daily dose: 7.134 mg/day  Pump size: 20 cc  Last fill: 12/20/19  Next Fill: 1/31/20   Yes Provider, Historical   temazepam (RESTORIL) 30 mg capsule Take 30 mg by mouth nightly as needed for Sleep. Yes Provider, Historical   cyclobenzaprine (FLEXERIL) 10 mg tablet Take 10 mg by mouth three (3) times daily as needed for Muscle Spasm(s). Yes Provider, Historical   enalapril (VASOTEC) 10 mg tablet Take 10 mg by mouth two (2) times a day. Yes Provider, Historical   oxyCODONE-acetaminophen (PERCOCET 10)  mg per tablet Take 1 Tab by mouth every four (4) hours as needed for Pain.  Indications: PAIN   Yes Provider, Historical   budesonide-formoterol (SYMBICORT) 160-4.5 mcg/actuation HFA inhaler Take 2 Puffs by inhalation daily. Yes Provider, Historical   ticagrelor (BRILINTA) 90 mg tablet Take 1 Tab by mouth every twelve (12) hours every twelve (12) hours. Patient not taking: Reported on 12/23/2022 1/8/20   Suzie Whittington NP   atorvastatin (LIPITOR) 40 mg tablet Take 40 mg by mouth daily. Patient not taking: Reported on 12/23/2022    Other, MD Chavo   apremilast 30 mg tab Take 30 mg by mouth two (2) times a day.  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  Patient not taking: Reported on 12/23/2022    Provider, Historical     No Known Allergies   Social History     Tobacco Use    Smoking status: Smoker, Current Status Unknown    Smokeless tobacco: Never   Substance Use Topics    Alcohol use: No      Family History   Problem Relation Age of Onset    Cancer Mother     Stroke Father         Current Facility-Administered Medications   Medication Dose Route Frequency    tamsulosin (FLOMAX) capsule 0.4 mg  0.4 mg Oral DAILY    clopidogreL (PLAVIX) tablet 75 mg  75 mg Oral DAILY    predniSONE (DELTASONE) tablet 10 mg  10 mg Oral DAILY WITH BREAKFAST    dilTIAZem ER (CARDIZEM CD) capsule 180 mg  180 mg Oral DAILY    midodrine (PROAMATINE) tablet 5 mg  5 mg Oral TID WITH MEALS    apremilast tab 30 mg (Patient Supplied)  30 mg Oral BID    aspirin delayed-release tablet 81 mg  81 mg Oral DAILY    atorvastatin (LIPITOR) tablet 40 mg  40 mg Oral DAILY    arformoterol 15 mcg/budesonide 0.5 mg neb solution   Nebulization BID RT    [Held by provider] metoprolol tartrate (LOPRESSOR) tablet 25 mg  25 mg Oral Q12H    sodium chloride (NS) flush 5-40 mL  5-40 mL IntraVENous Q8H    enoxaparin (LOVENOX) injection 40 mg  40 mg SubCUTAneous DAILY    doxycycline (VIBRAMYCIN) 100 mg in 0.9% sodium chloride (MBP/ADV) 100 mL MBP  100 mg IntraVENous Q12H    cefTRIAXone (ROCEPHIN) 1 g in 0.9% sodium chloride (MBP/ADV) 50 mL MBP  1 g IntraVENous Q24H    balsam peru-castor oiL (VENELEX) ointment Topical BID       Review of Systems:  Constitutional: positive for fatigue  Eyes: negative  Ears, nose, mouth, throat, and face: negative  Respiratory: positive for cough, sputum, wheezing, dyspnea on exertion, or chronic bronchitis  Cardiovascular: negative  Gastrointestinal: negative  Genitourinary:negative  Integument/breast: negative  Hematologic/lymphatic: negative  Musculoskeletal:negative  Neurological: negative  Behavioral/Psych: negative  Endocrine: negative  Allergic/Immunologic: negative    Objective:   Vital Signs:    Visit Vitals  /63   Pulse 97   Temp 98.3 °F (36.8 °C)   Resp 19   Ht 6' 1\" (1.854 m)   Wt 102.1 kg (225 lb 1.4 oz)   SpO2 92%   BMI 29.70 kg/m²       O2 Device: None (Room air)   O2 Flow Rate (L/min): 1 l/min   Temp (24hrs), Av.5 °F (36.9 °C), Min:97.9 °F (36.6 °C), Max:98.9 °F (37.2 °C)       Intake/Output:   Last shift:      No intake/output data recorded. Last 3 shifts:  1901 -  0700  In: 1220 [P.O.:1220]  Out: 2325 [Urine:2325]    Intake/Output Summary (Last 24 hours) at 2022 1039  Last data filed at 2022 0115  Gross per 24 hour   Intake 860 ml   Output 925 ml   Net -65 ml        Physical Exam:   General:  Alert, cooperative, no distress, appears stated age. Lying in bed. On RA. Conversant. Head:  Normocephalic, without obvious abnormality, atraumatic. Eyes:  Conjunctivae/corneas clear. PERRL, EOMs intact. Nose: Nares normal. Septum midline. Mucosa normal. No drainage or sinus tenderness. Throat: Lips, mucosa, and tongue normal. Teeth and gums normal.   Neck: Supple, symmetrical, trachea midline, no adenopathy, thyroid: no enlargment/tenderness/nodules, no carotid bruit and no JVD. Back:   Symmetric, no curvature. ROM normal.   Lungs:   Clear to auscultation bilaterally. Some decreased BS in bases. Breathing comfortably. Chest wall:  No tenderness or deformity.    Heart:  Regular rate and rhythm, S1, S2 normal, no murmur, click, rub or gallop. Abdomen:   Soft, non-tender. Bowel sounds normal. No masses,  No organomegaly. Extremities: Extremities normal, atraumatic, no cyanosis or edema. Pulses: 2+ and symmetric all extremities. Skin: Skin color, texture, turgor normal. No rashes or lesions   Lymph nodes: Cervical, supraclavicular, and axillary nodes normal.   Neurologic: Grossly nonfocal, motor intact. Data review:     Recent Results (from the past 24 hour(s))   CBC WITH AUTOMATED DIFF    Collection Time: 12/25/22  1:26 AM   Result Value Ref Range    WBC 18.1 (H) 4.1 - 11.1 K/uL    RBC 4.35 4.10 - 5.70 M/uL    HGB 11.6 (L) 12.1 - 17.0 g/dL    HCT 37.3 36.6 - 50.3 %    MCV 85.7 80.0 - 99.0 FL    MCH 26.7 26.0 - 34.0 PG    MCHC 31.1 30.0 - 36.5 g/dL    RDW 16.0 (H) 11.5 - 14.5 %    PLATELET 907 377 - 081 K/uL    MPV 8.9 8.9 - 12.9 FL    NRBC 0.0 0  WBC    ABSOLUTE NRBC 0.00 0.00 - 0.01 K/uL    NEUTROPHILS 85 (H) 32 - 75 %    LYMPHOCYTES 10 (L) 12 - 49 %    MONOCYTES 3 (L) 5 - 13 %    EOSINOPHILS 2 0 - 7 %    BASOPHILS 0 0 - 1 %    IMMATURE GRANULOCYTES 0 0.0 - 0.5 %    ABS. NEUTROPHILS 15.4 (H) 1.8 - 8.0 K/UL    ABS. LYMPHOCYTES 1.8 0.8 - 3.5 K/UL    ABS. MONOCYTES 0.5 0.0 - 1.0 K/UL    ABS. EOSINOPHILS 0.4 0.0 - 0.4 K/UL    ABS. BASOPHILS 0.0 0.0 - 0.1 K/UL    ABS. IMM. GRANS. 0.0 0.00 - 0.04 K/UL    DF MANUAL      RBC COMMENTS NORMOCYTIC, NORMOCHROMIC         Imaging:  I have personally reviewed the patients radiographs and have reviewed the reports:  12-23-22: CT of chest: IMPRESSION     1. There is bilateral lower lobe pneumonia. The pneumonia in the left lower lobe  is similar to the examination of 12/23/2022 at 1150. The airspace disease in the  right lower lobe has actually worsened in this short time interval. This is  consistent with bilateral lower lobe pneumonia. Follow-up to resolution is  suggested. 2. There is a small left pleural effusion that is probably parapneumonic.   3. There is severe coronary artery calcification        Golden Sauceda MD

## 2022-12-25 NOTE — PROGRESS NOTES
End of Shift Note    Bedside shift change report given to Nelia Martinez RN (oncoming nurse) by Sadia Elaine RN (offgoing nurse). Report included the following information SBAR    Shift worked:  7p-7a     Shift summary and any significant changes:    2000: PRN IV Toradol added for headache; given  2100: held nighttime metoprolol due to SBP <110    2130: patient in Afib rate controlled. Patient states he has hx of Afib and takes 180mg Diltiazem daily. I added to PTA medlist and notified provider SEAN Hoover PA added 180mg Diltiazem daily & Midodrine     Autohold metoprolol    Placed patient on 1L O2 while sleeping drop to 80's while asleep       Concerns for physician to address:       Zone phone for oncoming shift:          Activity:  Activity Level: Up with Assistance  Number times ambulated in hallways past shift: 0  Number of times OOB to chair past shift: 0    Cardiac:   Cardiac Monitoring: Yes      Cardiac Rhythm: Atrial Fib    Access:  Current line(s): PIV     Genitourinary:   Urinary status: voiding    Respiratory:   O2 Device: Nasal cannula  Chronic home O2 use?: NO  Incentive spirometer at bedside: NO       GI:     Current diet:  ADULT DIET Regular; 4 carb choices (60 gm/meal); Low Fat/Low Chol/High Fiber/2 gm Na;  Low Sodium (2 gm)  Passing flatus: YES  Tolerating current diet: YES       Pain Management:   Patient states pain is manageable on current regimen: YES    Skin:  Sreekanth Score: 22  Interventions: increase time out of bed and PT/OT consult    Patient Safety:  Fall Score:    Interventions: bed/chair alarm, assistive device (walker, cane, etc), gripper socks, pt to call before getting OOB, and stay with me (per policy)       Length of Stay:  Expected LOS: - - -  Actual LOS: 2      Sadia Elaine RN

## 2022-12-27 LAB
FLUID CULTURE, SPNG2: NORMAL
L PNEUMO1 AG UR QL IA: NEGATIVE
M PNEUMO IGG SER IA-ACNC: 157 U/ML (ref 0–99)
M PNEUMO IGM SER IA-ACNC: <770 U/ML (ref 0–769)
ORGANISM ID, SPNG3: NORMAL
PLEASE NOTE, SPNG4: NORMAL
S PNEUM AG SPEC QL LA: NEGATIVE
SPECIMEN SOURCE: NORMAL
SPECIMEN SOURCE: NORMAL
SPECIMEN, SPNG1: NORMAL

## 2022-12-29 LAB
BACTERIA SPEC CULT: NORMAL
BACTERIA SPEC CULT: NORMAL
SERVICE CMNT-IMP: NORMAL
SERVICE CMNT-IMP: NORMAL

## 2023-10-19 ENCOUNTER — APPOINTMENT (RX ONLY)
Dept: URBAN - METROPOLITAN AREA CLINIC 53 | Facility: CLINIC | Age: 73
Setting detail: DERMATOLOGY
End: 2023-10-19

## 2023-10-19 DIAGNOSIS — L40.0 PSORIASIS VULGARIS: ICD-10-CM | Status: WELL CONTROLLED

## 2023-10-19 PROCEDURE — ? OTEZLA MONITORING

## 2023-10-19 PROCEDURE — ? PRESCRIPTION MEDICATION MANAGEMENT

## 2023-10-19 PROCEDURE — 99214 OFFICE O/P EST MOD 30 MIN: CPT

## 2023-10-19 PROCEDURE — ? PRESCRIPTION

## 2023-10-19 PROCEDURE — ? COUNSELING

## 2023-10-19 RX ORDER — APREMILAST 30 MG/1
TABLET, FILM COATED ORAL
Qty: 180 | Refills: 3 | Status: ERX

## 2023-10-19 ASSESSMENT — PGA PSORIASIS: PGA PSORIASIS 2020: CLEAR

## 2023-10-19 ASSESSMENT — BSA PSORIASIS: % BODY COVERED IN PSORIASIS: 0

## 2024-10-14 ENCOUNTER — APPOINTMENT (RX ONLY)
Dept: URBAN - METROPOLITAN AREA CLINIC 53 | Facility: CLINIC | Age: 74
Setting detail: DERMATOLOGY
End: 2024-10-14

## 2024-10-14 DIAGNOSIS — B07.8 OTHER VIRAL WARTS: ICD-10-CM | Status: INADEQUATELY CONTROLLED

## 2024-10-14 DIAGNOSIS — L81.4 OTHER MELANIN HYPERPIGMENTATION: ICD-10-CM

## 2024-10-14 DIAGNOSIS — D18.0 HEMANGIOMA: ICD-10-CM | Status: STABLE

## 2024-10-14 DIAGNOSIS — L40.0 PSORIASIS VULGARIS: ICD-10-CM | Status: WELL CONTROLLED

## 2024-10-14 DIAGNOSIS — D22 MELANOCYTIC NEVI: ICD-10-CM

## 2024-10-14 DIAGNOSIS — L82.1 OTHER SEBORRHEIC KERATOSIS: ICD-10-CM

## 2024-10-14 DIAGNOSIS — Z85.828 PERSONAL HISTORY OF OTHER MALIGNANT NEOPLASM OF SKIN: ICD-10-CM

## 2024-10-14 PROBLEM — D22.5 MELANOCYTIC NEVI OF TRUNK: Status: ACTIVE | Noted: 2024-10-14

## 2024-10-14 PROBLEM — D18.01 HEMANGIOMA OF SKIN AND SUBCUTANEOUS TISSUE: Status: ACTIVE | Noted: 2024-10-14

## 2024-10-14 PROCEDURE — ? BENIGN DESTRUCTION

## 2024-10-14 PROCEDURE — ? TREATMENT REGIMEN

## 2024-10-14 PROCEDURE — ? DIAGNOSIS COMMENT

## 2024-10-14 PROCEDURE — ? COUNSELING

## 2024-10-14 PROCEDURE — 17110 DESTRUCTION B9 LES UP TO 14: CPT

## 2024-10-14 PROCEDURE — ? OTEZLA MONITORING

## 2024-10-14 PROCEDURE — ? MDM - TREATMENT GOALS

## 2024-10-14 PROCEDURE — 99213 OFFICE O/P EST LOW 20 MIN: CPT | Mod: 25

## 2024-10-14 PROCEDURE — ? PRESCRIPTION

## 2024-10-14 PROCEDURE — ? PRESCRIPTION MEDICATION MANAGEMENT

## 2024-10-14 RX ORDER — CLOBETASOL PROPIONATE 0.5 MG/ML
SOLUTION TOPICAL
Qty: 50 | Refills: 11 | Status: ERX | COMMUNITY
Start: 2024-10-14

## 2024-10-14 RX ADMIN — CLOBETASOL PROPIONATE: 0.5 SOLUTION TOPICAL at 00:00

## 2024-10-14 ASSESSMENT — LOCATION DETAILED DESCRIPTION DERM
LOCATION DETAILED: LEFT INFERIOR LATERAL MIDBACK
LOCATION DETAILED: PERIUMBILICAL SKIN
LOCATION DETAILED: RIGHT MID-UPPER BACK
LOCATION DETAILED: LEFT UPPER CUTANEOUS LIP
LOCATION DETAILED: LEFT ANTERIOR DISTAL THIGH
LOCATION DETAILED: LEFT MEDIAL UPPER BACK
LOCATION DETAILED: LEFT INFERIOR MEDIAL UPPER BACK
LOCATION DETAILED: MID-FRONTAL SCALP
LOCATION DETAILED: LEFT CAVUM CONCHA
LOCATION DETAILED: RIGHT CRUS OF HELIX
LOCATION DETAILED: LEFT INFERIOR UPPER BACK
LOCATION DETAILED: RIGHT POSTERIOR SHOULDER
LOCATION DETAILED: LEFT POSTERIOR SHOULDER

## 2024-10-14 ASSESSMENT — LOCATION SIMPLE DESCRIPTION DERM
LOCATION SIMPLE: LEFT UPPER BACK
LOCATION SIMPLE: LEFT LOWER BACK
LOCATION SIMPLE: LEFT SHOULDER
LOCATION SIMPLE: LEFT EAR
LOCATION SIMPLE: ABDOMEN
LOCATION SIMPLE: RIGHT SHOULDER
LOCATION SIMPLE: RIGHT UPPER BACK
LOCATION SIMPLE: LEFT THIGH
LOCATION SIMPLE: ANTERIOR SCALP
LOCATION SIMPLE: LEFT LIP
LOCATION SIMPLE: RIGHT EAR

## 2024-10-14 ASSESSMENT — PGA PSORIASIS: PGA PSORIASIS 2020: CLEAR

## 2024-10-14 ASSESSMENT — LOCATION ZONE DERM
LOCATION ZONE: ARM
LOCATION ZONE: LIP
LOCATION ZONE: EAR
LOCATION ZONE: TRUNK
LOCATION ZONE: LEG
LOCATION ZONE: SCALP

## 2024-10-14 ASSESSMENT — BSA PSORIASIS: % BODY COVERED IN PSORIASIS: 0

## 2024-10-14 ASSESSMENT — ITCH NUMERIC RATING SCALE: HOW SEVERE IS YOUR ITCHING?: 0

## 2024-10-14 NOTE — PROCEDURE: BENIGN DESTRUCTION
Render Post-Care Instructions In Note?: no
Anesthesia Volume In Cc: 0
Medical Necessity Clause: This procedure was medically necessary because the lesions that were treated were:
Medical Necessity Information: It is in your best interest to select a reason for this procedure from the list below. All of these items fulfill various CMS LCD requirements except the new and changing color options.
Detail Level: Detailed
Consent: The patient's consent was obtained including but not limited to risks of crusting, scabbing, blistering, scarring, darker or lighter pigmentary change, recurrence, incomplete removal and infection.
Post-Care Instructions: I reviewed with the patient in detail post-care instructions. Patient is to wear sunprotection, and avoid picking at any of the treated lesions. Pt may apply Vaseline to crusted or scabbing areas.

## 2024-10-15 ENCOUNTER — APPOINTMENT (RX ONLY)
Dept: URBAN - METROPOLITAN AREA CLINIC 53 | Facility: CLINIC | Age: 74
Setting detail: DERMATOLOGY
End: 2024-10-15

## 2024-10-15 DIAGNOSIS — L40.0 PSORIASIS VULGARIS: ICD-10-CM

## 2024-10-15 PROCEDURE — ? PRESCRIPTION

## 2024-10-15 RX ORDER — CLOBETASOL PROPIONATE 0.5 MG/ML
SOLUTION TOPICAL
Qty: 50 | Refills: 11 | Status: ERX

## 2024-10-15 RX ORDER — APREMILAST 30 MG/1
TABLET, FILM COATED ORAL
Qty: 180 | Refills: 11 | Status: ERX

## 2025-03-10 ENCOUNTER — APPOINTMENT (OUTPATIENT)
Dept: URBAN - METROPOLITAN AREA CLINIC 53 | Facility: CLINIC | Age: 75
Setting detail: DERMATOLOGY
End: 2025-03-10

## 2025-03-10 DIAGNOSIS — L57.8 OTHER SKIN CHANGES DUE TO CHRONIC EXPOSURE TO NONIONIZING RADIATION: ICD-10-CM

## 2025-03-10 DIAGNOSIS — L57.0 ACTINIC KERATOSIS: ICD-10-CM

## 2025-03-10 PROCEDURE — ? COUNSELING

## 2025-03-10 PROCEDURE — 17004 DESTROY PREMAL LESIONS 15/>: CPT

## 2025-03-10 PROCEDURE — 99213 OFFICE O/P EST LOW 20 MIN: CPT | Mod: 25

## 2025-03-10 PROCEDURE — ? LIQUID NITROGEN

## 2025-03-10 PROCEDURE — ? TREATMENT REGIMEN

## 2025-03-10 ASSESSMENT — LOCATION ZONE DERM
LOCATION ZONE: NECK
LOCATION ZONE: FACE
LOCATION ZONE: SCALP

## 2025-03-10 ASSESSMENT — LOCATION DETAILED DESCRIPTION DERM
LOCATION DETAILED: LEFT INFERIOR LATERAL MALAR CHEEK
LOCATION DETAILED: RIGHT SUPERIOR PREAURICULAR CHEEK
LOCATION DETAILED: RIGHT CENTRAL TEMPLE
LOCATION DETAILED: LEFT LATERAL ZYGOMA
LOCATION DETAILED: RIGHT SUPERIOR LATERAL MALAR CHEEK
LOCATION DETAILED: LEFT SUPERIOR PARIETAL SCALP
LOCATION DETAILED: RIGHT INFERIOR FOREHEAD
LOCATION DETAILED: LEFT CENTRAL ZYGOMA
LOCATION DETAILED: LEFT SUPERIOR FOREHEAD
LOCATION DETAILED: LEFT SUPERIOR LATERAL MALAR CHEEK
LOCATION DETAILED: RIGHT INFERIOR CENTRAL MALAR CHEEK
LOCATION DETAILED: LEFT MEDIAL FRONTAL SCALP
LOCATION DETAILED: LEFT CENTRAL POSTAURICULAR SKIN
LOCATION DETAILED: LEFT LATERAL MANDIBULAR CHEEK
LOCATION DETAILED: LEFT CENTRAL FRONTAL SCALP
LOCATION DETAILED: RIGHT SUPERIOR FOREHEAD
LOCATION DETAILED: LEFT SUPERIOR LATERAL NECK
LOCATION DETAILED: LEFT INFERIOR CENTRAL MALAR CHEEK
LOCATION DETAILED: RIGHT CENTRAL FRONTAL SCALP

## 2025-03-10 ASSESSMENT — LOCATION SIMPLE DESCRIPTION DERM
LOCATION SIMPLE: RIGHT FOREHEAD
LOCATION SIMPLE: RIGHT TEMPLE
LOCATION SIMPLE: LEFT FOREHEAD
LOCATION SIMPLE: SCALP
LOCATION SIMPLE: RIGHT CHEEK
LOCATION SIMPLE: RIGHT SCALP
LOCATION SIMPLE: LEFT SCALP
LOCATION SIMPLE: NECK
LOCATION SIMPLE: LEFT CHEEK
LOCATION SIMPLE: LEFT ZYGOMA

## 2025-06-10 ENCOUNTER — APPOINTMENT (OUTPATIENT)
Dept: URBAN - METROPOLITAN AREA CLINIC 53 | Facility: CLINIC | Age: 75
Setting detail: DERMATOLOGY
End: 2025-06-10

## 2025-06-10 ENCOUNTER — RX ONLY (RX ONLY)
Age: 75
End: 2025-06-10

## 2025-06-10 DIAGNOSIS — L40.0 PSORIASIS VULGARIS: ICD-10-CM

## 2025-06-10 PROCEDURE — ? PRESCRIPTION

## 2025-06-10 RX ORDER — CLOBETASOL PROPIONATE 0.5 MG/ML
1 SOLUTION TOPICAL QD
Qty: 50 | Refills: 2 | Status: ERX

## 2025-06-10 RX ORDER — CLOBETASOL PROPIONATE 0.5 MG/G
1 OINTMENT TOPICAL BID
Qty: 60 | Refills: 4 | Status: ERX

## 2025-07-15 ENCOUNTER — APPOINTMENT (OUTPATIENT)
Dept: URBAN - METROPOLITAN AREA CLINIC 53 | Facility: CLINIC | Age: 75
Setting detail: DERMATOLOGY
End: 2025-07-15

## 2025-07-15 DIAGNOSIS — B07.8 OTHER VIRAL WARTS: ICD-10-CM | Status: INADEQUATELY CONTROLLED

## 2025-07-15 PROCEDURE — ? COUNSELING

## 2025-07-15 PROCEDURE — ? LIQUID NITROGEN

## 2025-07-15 ASSESSMENT — LOCATION SIMPLE DESCRIPTION DERM: LOCATION SIMPLE: RIGHT HAND

## 2025-07-15 ASSESSMENT — LOCATION ZONE DERM: LOCATION ZONE: HAND

## 2025-07-15 ASSESSMENT — LOCATION DETAILED DESCRIPTION DERM: LOCATION DETAILED: RIGHT RADIAL PALM

## 2025-07-15 NOTE — PROCEDURE: LIQUID NITROGEN
Spray Paint Text: The liquid nitrogen was applied to the skin utilizing a spray paint frosting technique.
Medical Necessity Clause: This procedure was medically necessary because the lesions that were treated were:
Show Applicator Variable?: Yes
Render Post-Care Instructions In Note?: no
Post-Care Instructions: I reviewed with the patient in detail post-care instructions. Patient is to wear sunprotection, and avoid picking at any of the treated lesions. Pt may apply Vaseline to crusted or scabbing areas.
Number Of Freeze-Thaw Cycles: 3 freeze-thaw cycles
Consent: The patient's consent was obtained including but not limited to risks of crusting, scabbing, blistering, scarring, darker or lighter pigmentary change, recurrence, incomplete removal and infection.
Detail Level: Detailed
Medical Necessity Information: It is in your best interest to select a reason for this procedure from the list below. All of these items fulfill various CMS LCD requirements except the new and changing color options.
Pared With?: Dermablade

## 2025-08-12 ENCOUNTER — RX ONLY (RX ONLY)
Age: 75
End: 2025-08-12

## 2025-08-12 RX ORDER — APREMILAST 30 MG/1
TABLET, FILM COATED ORAL
Qty: 180 | Refills: 11 | Status: ERX

## 2025-08-25 ENCOUNTER — APPOINTMENT (OUTPATIENT)
Dept: URBAN - METROPOLITAN AREA CLINIC 53 | Facility: CLINIC | Age: 75
Setting detail: DERMATOLOGY
End: 2025-08-25

## 2025-08-25 DIAGNOSIS — L57.0 ACTINIC KERATOSIS: ICD-10-CM | Status: INADEQUATELY CONTROLLED

## 2025-08-25 DIAGNOSIS — B07.8 OTHER VIRAL WARTS: ICD-10-CM | Status: INADEQUATELY CONTROLLED

## 2025-08-25 DIAGNOSIS — L57.8 OTHER SKIN CHANGES DUE TO CHRONIC EXPOSURE TO NONIONIZING RADIATION: ICD-10-CM | Status: STABLE

## 2025-08-25 PROCEDURE — ? TREATMENT REGIMEN

## 2025-08-25 PROCEDURE — ? FULL BODY SKIN EXAM - DECLINED

## 2025-08-25 PROCEDURE — ? COUNSELING

## 2025-08-25 PROCEDURE — ? LIQUID NITROGEN

## 2025-08-25 ASSESSMENT — LOCATION SIMPLE DESCRIPTION DERM
LOCATION SIMPLE: SCALP
LOCATION SIMPLE: RIGHT HAND
LOCATION SIMPLE: LEFT FOREHEAD
LOCATION SIMPLE: RIGHT FOREHEAD

## 2025-08-25 ASSESSMENT — LOCATION DETAILED DESCRIPTION DERM
LOCATION DETAILED: RIGHT INFERIOR LATERAL FOREHEAD
LOCATION DETAILED: RIGHT SUPERIOR FOREHEAD
LOCATION DETAILED: RIGHT RADIAL PALM
LOCATION DETAILED: LEFT CENTRAL FRONTAL SCALP
LOCATION DETAILED: RIGHT SUPERIOR FRONTAL SCALP
LOCATION DETAILED: RIGHT LATERAL FOREHEAD
LOCATION DETAILED: LEFT SUPERIOR FRONTAL SCALP
LOCATION DETAILED: LEFT LATERAL FOREHEAD
LOCATION DETAILED: RIGHT CENTRAL FRONTAL SCALP

## 2025-08-25 ASSESSMENT — LOCATION ZONE DERM
LOCATION ZONE: FACE
LOCATION ZONE: SCALP
LOCATION ZONE: HAND

## (undated) DEVICE — SYRINGE ANGIO 10 CC BRL STD PRNT POLYCARB LT BLU MEDALLION

## (undated) DEVICE — PACK PROCEDURE SURG HRT CATH

## (undated) DEVICE — 40 MHZ CORONARY IMAGING CATHETER: Brand: OPTICROSS

## (undated) DEVICE — CATHETER ETER ANGIO L110CM OD5FR ID046IN L75CM 038IN 145DEG CARD

## (undated) DEVICE — HI-TORQUE VERSACORE FLOPPY GUIDE WIRE SYSTEM 145 CM: Brand: HI-TORQUE VERSACORE

## (undated) DEVICE — TR BAND RADIAL ARTERY COMPRESSION DEVICE: Brand: TR BAND

## (undated) DEVICE — ANGIOGRAPHY KIT CUST [K0910930B] [MERIT MEDICAL SYSTEMS INC]

## (undated) DEVICE — SYR ART 700 CLEAR MARK 7 -- ARTERION

## (undated) DEVICE — Device: Brand: PROWATER

## (undated) DEVICE — COVER LT HNDL BLU PLAS

## (undated) DEVICE — Device: Brand: ASAHI SION BLUE

## (undated) DEVICE — RADIFOCUS OPTITORQUE ANGIOGRAPHIC CATHETER: Brand: OPTITORQUE

## (undated) DEVICE — NC TREK CORONARY DILATATION CATHETER 4.0 MM X 15 MM / RAPID-EXCHANGE: Brand: NC TREK

## (undated) DEVICE — SYR POWER 150ML 8IN FILL TUBE --

## (undated) DEVICE — TUBING PRSS MON L6IN PVC M FEM CONN

## (undated) DEVICE — CUSTOM KT PTCA INFL DEV K05 00053H

## (undated) DEVICE — COPILOT BLEEDBACK CONTROL VALVE: Brand: COPILOT

## (undated) DEVICE — AIRLIFE™ ADULT CUSHION NASAL CANNULA 14 FOOT (4.3) CRUSH-RESISTANT OXYGEN TUBING, AND U/CONNECT-IT ADAPTER: Brand: AIRLIFE™

## (undated) DEVICE — GLIDESHEATH SLENDER ACCESS KIT: Brand: GLIDESHEATH SLENDER

## (undated) DEVICE — 3M™ TEGADERM™ TRANSPARENT FILM DRESSING FRAME STYLE, 1626W, 4 IN X 4-3/4 IN (10 CM X 12 CM), 50/CT 4CT/CASE: Brand: 3M™ TEGADERM™

## (undated) DEVICE — BLADE ASSEMB CLP HAIR FINE --

## (undated) DEVICE — MEDI-TRACE CADENCE ADULT, DEFIBRILLATION ELECTRODE -RTS  (10 PR/PK) - PHILIPS: Brand: MEDI-TRACE CADENCE

## (undated) DEVICE — Z DISCONTINUED NO SUB IDED SET EXTN W/ 4 W STPCOCK M SPIN LOK 36IN

## (undated) DEVICE — DISPOSABLE PULLBACK SLED FOR MOTORDRIVE

## (undated) DEVICE — MINI TREK CORONARY DILATATION CATHETER 2.0 MM X 12 MM / RAPID-EXCHANGE: Brand: MINI TREK

## (undated) DEVICE — CATH GUID COR EB35 6FR 100CM -- LAUNCHER

## (undated) DEVICE — TREK CORONARY DILATATION CATHETER 3.0 MM X 15 MM / RAPID-EXCHANGE: Brand: TREK

## (undated) DEVICE — GUIDEWIRE VASC L260CM 0.035IN J TIP L3MM PTFE FIX COR NAMIC

## (undated) DEVICE — SPLINT WR POS F/ARTERIAL ACC -- BX/10